# Patient Record
Sex: FEMALE | Race: WHITE | NOT HISPANIC OR LATINO | Employment: OTHER | URBAN - METROPOLITAN AREA
[De-identification: names, ages, dates, MRNs, and addresses within clinical notes are randomized per-mention and may not be internally consistent; named-entity substitution may affect disease eponyms.]

---

## 2017-01-25 ENCOUNTER — TRANSFERRED RECORDS (OUTPATIENT)
Dept: HEALTH INFORMATION MANAGEMENT | Facility: CLINIC | Age: 82
End: 2017-01-25

## 2017-02-28 ENCOUNTER — OFFICE VISIT (OUTPATIENT)
Dept: INTERNAL MEDICINE | Facility: CLINIC | Age: 82
End: 2017-02-28
Payer: COMMERCIAL

## 2017-02-28 VITALS
TEMPERATURE: 97.1 F | OXYGEN SATURATION: 96 % | HEART RATE: 86 BPM | DIASTOLIC BLOOD PRESSURE: 60 MMHG | WEIGHT: 181 LBS | BODY MASS INDEX: 30.16 KG/M2 | HEIGHT: 65 IN | RESPIRATION RATE: 16 BRPM | SYSTOLIC BLOOD PRESSURE: 128 MMHG

## 2017-02-28 DIAGNOSIS — E78.5 HYPERLIPIDEMIA LDL GOAL <100: ICD-10-CM

## 2017-02-28 DIAGNOSIS — I10 ESSENTIAL HYPERTENSION WITH GOAL BLOOD PRESSURE LESS THAN 140/90: ICD-10-CM

## 2017-02-28 DIAGNOSIS — E03.9 ACQUIRED HYPOTHYROIDISM: ICD-10-CM

## 2017-02-28 DIAGNOSIS — E11.59 TYPE 2 DIABETES MELLITUS WITH OTHER CIRCULATORY COMPLICATIONS (H): Primary | ICD-10-CM

## 2017-02-28 DIAGNOSIS — E04.1 THYROID NODULE: ICD-10-CM

## 2017-02-28 DIAGNOSIS — F41.9 ANXIETY: ICD-10-CM

## 2017-02-28 LAB
ANION GAP SERPL CALCULATED.3IONS-SCNC: 8 MMOL/L (ref 3–14)
BUN SERPL-MCNC: 18 MG/DL (ref 7–30)
CALCIUM SERPL-MCNC: 10.1 MG/DL (ref 8.5–10.1)
CHLORIDE SERPL-SCNC: 105 MMOL/L (ref 94–109)
CHOLEST SERPL-MCNC: 218 MG/DL
CO2 SERPL-SCNC: 26 MMOL/L (ref 20–32)
CREAT SERPL-MCNC: 0.67 MG/DL (ref 0.52–1.04)
GFR SERPL CREATININE-BSD FRML MDRD: 84 ML/MIN/1.7M2
GLUCOSE SERPL-MCNC: 120 MG/DL (ref 70–99)
HBA1C MFR BLD: 6.3 % (ref 4.3–6)
HDLC SERPL-MCNC: 52 MG/DL
LDLC SERPL CALC-MCNC: 124 MG/DL
NONHDLC SERPL-MCNC: 166 MG/DL
POTASSIUM SERPL-SCNC: 4.2 MMOL/L (ref 3.4–5.3)
SODIUM SERPL-SCNC: 139 MMOL/L (ref 133–144)
TRIGL SERPL-MCNC: 209 MG/DL

## 2017-02-28 PROCEDURE — 80061 LIPID PANEL: CPT | Performed by: INTERNAL MEDICINE

## 2017-02-28 PROCEDURE — 36415 COLL VENOUS BLD VENIPUNCTURE: CPT | Performed by: INTERNAL MEDICINE

## 2017-02-28 PROCEDURE — 80048 BASIC METABOLIC PNL TOTAL CA: CPT | Performed by: INTERNAL MEDICINE

## 2017-02-28 PROCEDURE — 83036 HEMOGLOBIN GLYCOSYLATED A1C: CPT | Performed by: INTERNAL MEDICINE

## 2017-02-28 PROCEDURE — 99207 C FOOT EXAM  NO CHARGE: CPT | Performed by: INTERNAL MEDICINE

## 2017-02-28 PROCEDURE — 99214 OFFICE O/P EST MOD 30 MIN: CPT | Performed by: INTERNAL MEDICINE

## 2017-02-28 RX ORDER — LEVOTHYROXINE SODIUM 75 UG/1
75 TABLET ORAL EVERY MORNING
Qty: 90 TABLET | Refills: 3 | Status: SHIPPED | OUTPATIENT
Start: 2017-02-28 | End: 2017-08-24

## 2017-02-28 ASSESSMENT — ANXIETY QUESTIONNAIRES
2. NOT BEING ABLE TO STOP OR CONTROL WORRYING: MORE THAN HALF THE DAYS
7. FEELING AFRAID AS IF SOMETHING AWFUL MIGHT HAPPEN: SEVERAL DAYS
IF YOU CHECKED OFF ANY PROBLEMS ON THIS QUESTIONNAIRE, HOW DIFFICULT HAVE THESE PROBLEMS MADE IT FOR YOU TO DO YOUR WORK, TAKE CARE OF THINGS AT HOME, OR GET ALONG WITH OTHER PEOPLE: NOT DIFFICULT AT ALL
1. FEELING NERVOUS, ANXIOUS, OR ON EDGE: MORE THAN HALF THE DAYS
GAD7 TOTAL SCORE: 7
5. BEING SO RESTLESS THAT IT IS HARD TO SIT STILL: NOT AT ALL
6. BECOMING EASILY ANNOYED OR IRRITABLE: SEVERAL DAYS
3. WORRYING TOO MUCH ABOUT DIFFERENT THINGS: SEVERAL DAYS

## 2017-02-28 ASSESSMENT — PATIENT HEALTH QUESTIONNAIRE - PHQ9: 5. POOR APPETITE OR OVEREATING: NOT AT ALL

## 2017-02-28 NOTE — NURSING NOTE
"Chief Complaint   Patient presents with     Diabetes     Anxiety       Initial /60 (BP Location: Right arm, Patient Position: Chair, Cuff Size: Adult Large)  Pulse 86  Temp 97.1  F (36.2  C) (Oral)  Resp 16  Ht 5' 4.5\" (1.638 m)  Wt 181 lb (82.1 kg)  SpO2 96%  BMI 30.59 kg/m2 Estimated body mass index is 30.59 kg/(m^2) as calculated from the following:    Height as of this encounter: 5' 4.5\" (1.638 m).    Weight as of this encounter: 181 lb (82.1 kg).  Medication Reconciliation: complete    "

## 2017-02-28 NOTE — PROGRESS NOTES
SUBJECTIVE:                                                    Norma F Alpers is a 82 year old female who presents to clinic today for the following health issues:      Diabetes Follow-up    Patient is checking blood sugars: once daily.  Results are as follows:         am - 133    Diabetic concerns: None     Symptoms of hypoglycemia (low blood sugar): none     Paresthesias (numbness or burning in feet) or sores: No     Date of last diabetic eye exam: 11/15/16     Anxiety Follow-Up    Status since last visit: Improved  passed away 3/16/16    Other associated symptoms:None    Complicating factors:   Significant life event: Yes-   passed 3/16/16, coming up on anniversery   Current substance abuse: None  Depression symptoms: No  JENNY-7 SCORE 9/23/2015 2/28/2017   Total Score 11 7        GAD7         Amount of exercise or physical activity: None    Problems taking medications regularly: No    Medication side effects: none  Diet: low fat/cholesterol and diabetic      Other problems:   1. HTN: not monitoring at home  2. Hyperlipidemia: on medication without side effects.   3. Hypothyroidism: stable    Current concerns:   She has been having some moderate left frontal headache. This has been a year. It is mostly in the morning but can come on later in the day. It is about every other day. She can feel a little off balanced with it but no other symptoms. It resolves within about 30 minutes. She does not need to take anything for it. It is stable without progression. No other focal neurologic symptoms.       Patient Active Problem List   Diagnosis     Advanced directives, counseling/discussion     Essential hypertension with goal blood pressure less than 140/90     Hyperlipidemia LDL goal <100     GERD (gastroesophageal reflux disease)     Aphasic stroke     Osteopenia     Hypothyroidism     Anxiety     Thyroid nodule     Type 2 diabetes mellitus with other circulatory complications (H)       Current Outpatient  Prescriptions   Medication Sig Dispense Refill     Multiple Vitamins-Minerals (MULTIVITAMIN ADULT) CHEW Take 1 chew tab by mouth daily       blood glucose monitoring (ACCU-CHEK SMARTVIEW) test strip Use to test blood sugar 2-3 times daily or as directed. 100 strip prn     sertraline (ZOLOFT) 100 MG tablet Take 1 tablet (100 mg) by mouth every morning 90 tablet 2     lisinopril-hydrochlorothiazide (PRINZIDE,ZESTORETIC) 10-12.5 MG per tablet Take 1 tablet by mouth daily 90 tablet 3     potassium chloride SA (POTASSIUM CHLORIDE) 20 MEQ tablet Take 1 tablet (20 mEq) by mouth 2 times daily 180 tablet 2     pravastatin (PRAVACHOL) 40 MG tablet Take 1 tablet (40 mg) by mouth daily 90 tablet 3     raloxifene (EVISTA) 60 MG tablet Take 1 tablet (60 mg) by mouth daily 90 tablet 3     traZODone (DESYREL) 150 MG tablet Take 1 tablet (150 mg) by mouth At Bedtime 90 tablet 3     EPINEPHrine (EPIPEN) 0.3 MG/0.3ML injection Inject 0.3 mLs (0.3 mg) into the muscle once as needed for anaphylaxis 2 each 5     levothyroxine (SYNTHROID, LEVOTHROID) 75 MCG tablet Take 1 tablet (75 mcg) by mouth every morning 90 tablet 3     ranitidine (ZANTAC) 150 MG tablet Take 1 tablet (150 mg) by mouth 2 times daily as needed for heartburn 60 tablet 5     cetirizine (ZYRTEC) 10 MG tablet Take 10 mg by mouth daily as needed for allergies       multivitamin (OCUVITE) TABS Take 1 tablet by mouth daily       docusate sodium (COLACE) 100 MG tablet Take 100 mg by mouth 2 times daily       TURMERIC PO Take 1 tablet by mouth daily       glimepiride (AMARYL) 1 MG tablet Take 1 tablet (1 mg) by mouth every morning (before breakfast) 90 tablet 1     lancets thin MISC 1 Device 2 times daily. 200 each prn     aspirin 325 MG tablet Take  by mouth daily.       glucosamine-chondroitinoitin 750-600 MG TABS Take 1 tablet by mouth 2 times daily.         omega-3 fatty acids (FISH OIL) 1200 MG capsule Take 1 capsule by mouth daily.       Calcium Carbonate (CALCIUM 600 PO)  "Take 1 tablet by mouth daily 2 times daily         Social History   Substance Use Topics     Smoking status: Former Smoker     Quit date: 3/27/1975     Smokeless tobacco: Never Used     Alcohol use Yes      Comment: occasionally        ROS:  General: no fever, chills  Weight: stable  Vision:some issues with focus. Last eye exam 11/16, will schedule.  ENT: negative  Respiratory negative.  Cardiac: no chest pain or pressure  Abdominal: no nausea, vomiting, abdominal pain, bowel changes  Vascular no complaints of claudication  Neurologic:no complaints of neuropathy  Feet no lesions, in grown nails, edema   : no polyuria, hematuria, dysuria      Objective:  Patient alert in NAD  /60 (BP Location: Right arm, Patient Position: Chair, Cuff Size: Adult Large)  Pulse 86  Temp 97.1  F (36.2  C) (Oral)  Resp 16  Ht 5' 4.5\" (1.638 m)  Wt 181 lb (82.1 kg)  SpO2 96%  BMI 30.59 kg/m2       Wt Readings from Last 4 Encounters:   02/28/17 181 lb (82.1 kg)   08/09/16 179 lb (81.2 kg)   11/12/15 168 lb (76.2 kg)   11/02/15 169 lb 8 oz (76.9 kg)       CV: CV: normal S1, S2 without murmur, S3 or S4.  Carotid pulses: full  LUNGS: clear  Feet: pulses full, normal capillary refill  No lesions, sores or skin changes  Nails normal  Sensation able to feel fine filament    Lab Results   Component Value Date    A1C 6.8 07/21/2016    A1C 6.4 09/23/2015    A1C 6.3 01/14/2015    A1C 6.2 11/13/2014     Lab Results   Component Value Date    CHOL 214 07/21/2016    HDL 51 07/21/2016     07/21/2016    TRIG 208 07/21/2016    CHOLHDLRATIO 2.6 01/14/2015     JENNY-7 SCORE 9/23/2015 2/28/2017   Total Score 11 7         ASSESSMENT:   (E11.59) Type 2 diabetes mellitus with other circulatory complications (H)  (primary encounter diagnosis)  Comment: lab due  Plan: Basic metabolic panel, Hemoglobin A1c, FOOT         EXAM            (E03.9) Acquired hypothyroidism  Comment: clinically stable  Plan: continue med    (I10) Essential hypertension with " goal blood pressure less than 140/90  Comment: controlled  Plan: Basic metabolic panel        Continue med    (E78.5) Hyperlipidemia LDL goal <100  Comment: recheck, not at goal  Plan: Lipid Profile with reflex to direct LDL            (F41.9) Anxiety  Comment: improved  Plan: continue med    Adelita Kennedy MD  [facility]

## 2017-02-28 NOTE — MR AVS SNAPSHOT
"              After Visit Summary   2/28/2017    Norma F Alpers    MRN: [id number]           Patient Information     Date Of Birth          10/29/1934        Visit Information        Provider Department      2/28/2017 9:20 AM Adelita Kennedy MD [facility]        Today's Diagnoses     Type 2 diabetes mellitus with other circulatory complications (H)    -  1    Acquired hypothyroidism        Essential hypertension with goal blood pressure less than 140/90        Hyperlipidemia LDL goal <100           Follow-ups after your visit        Your next 10 appointments already scheduled     Apr 13, 2017  5:00 PM CDT   SHORT with Declan Schmitt MD   [facility] ([facility])    [address]  [facility] [id number] [phone]              Who to contact     If you have questions or need follow up information about today's clinic visit or your schedule please contact [facility] directly at [phone].  Normal or non-critical lab and imaging results will be communicated to you by MyChart, letter or phone within 4 business days after the clinic has received the results. If you do not hear from us within 7 days, please contact the clinic through MyChart or phone. If you have a critical or abnormal lab result, we will notify you by phone as soon as possible.  Submit refill requests through [employer] or call your pharmacy and they will forward the refill request to us. Please allow 3 business days for your refill to be completed.          Additional Information About Your Visit        MyChart Information     [employer] lets you send messages to your doctor, view your test results, renew your prescriptions, schedule appointments and more. To sign up, go to [email or web address] . Click on \"Log in\" on the left side of the screen, which will take you to the Welcome page. Then click on \"Sign up Now\" on the right side of the page.     You will be asked to " "enter the access code listed below, as well as some personal information. Please follow the directions to create your username and password.     Your access code is: [email or web address]  Expires: 3/26/2017  4:03 PM     Your access code will  in 90 days. If you need help or a new code, please call your [facility] or [phone].        Care EveryWhere ID     This is your Care EveryWhere ID. This could be used by other organizations to access your [facility] medical records  [id number]        Your Vitals Were     Pulse Temperature Respirations Height Pulse Oximetry BMI (Body Mass Index)    86 97.1  F (36.2  C) (Oral) 16 5' 4.5\" (1.638 m) 96% 30.59 kg/m2       Blood Pressure from Last 3 Encounters:   17 128/60   16 116/63   16 138/64    Weight from Last 3 Encounters:   17 181 lb (82.1 kg)   16 179 lb (81.2 kg)   11/12/15 168 lb (76.2 kg)              We Performed the Following     Basic metabolic panel     Hemoglobin A1c     Lipid Profile with reflex to direct LDL        Primary Care Provider Office Phone # Fax #    Declan Schmitt [phone]       [facility] [address] [place] [address]        Thank you!     Thank you for choosing [facility]  for your care. Our goal is always to provide you with excellent care. Hearing back from our patients is one way we can continue to improve our services. Please take a few minutes to complete the written survey that you may receive in the mail after your visit with us. Thank you!             Your Updated Medication List - Protect others around you: Learn how to safely use, store and throw away your medicines at www.disposemymeds.org.          This list is accurate as of: 17 10:33 AM.  Always use your most recent med list.                   Brand Name Dispense Instructions for use    aspirin 325 MG tablet      Take  by mouth daily.       blood glucose monitoring test strip    " ACCU-CHEK SMARTVIEW    100 strip    Use to test blood sugar 2-3 times daily or as directed.       CALCIUM 600 PO      Take 1 tablet by mouth daily 2 times daily       cetirizine 10 MG tablet    zyrTEC     Take 10 mg by mouth daily as needed for allergies       docusate sodium 100 MG tablet    COLACE     Take 100 mg by mouth 2 times daily       EPINEPHrine 0.3 MG/0.3ML injection     2 each    Inject 0.3 mLs (0.3 mg) into the muscle once as needed for anaphylaxis       glimepiride 1 MG tablet    AMARYL    90 tablet    Take 1 tablet (1 mg) by mouth every morning (before breakfast)       glucosamine-chondroitinoitin 750-600 MG Tabs      Take 1 tablet by mouth 2 times daily.       levothyroxine 75 MCG tablet    SYNTHROID/LEVOTHROID    90 tablet    Take 1 tablet (75 mcg) by mouth every morning       lisinopril-hydrochlorothiazide 10-12.5 MG per tablet    PRINZIDE/ZESTORETIC    90 tablet    Take 1 tablet by mouth daily       * multivitamin Tabs tablet      Take 1 tablet by mouth daily       * MULTIVITAMIN ADULT Chew      Take 1 chew tab by mouth daily       omega-3 fatty acids 1200 MG capsule      Take 1 capsule by mouth daily.       potassium chloride SA 20 MEQ CR tablet    potassium chloride    180 tablet    Take 1 tablet (20 mEq) by mouth 2 times daily       pravastatin 40 MG tablet    PRAVACHOL    90 tablet    Take 1 tablet (40 mg) by mouth daily       raloxifene 60 MG tablet    EVISTA    90 tablet    Take 1 tablet (60 mg) by mouth daily       ranitidine 150 MG tablet    ZANTAC    60 tablet    Take 1 tablet (150 mg) by mouth 2 times daily as needed for heartburn       sertraline 100 MG tablet    ZOLOFT    90 tablet    Take 1 tablet (100 mg) by mouth every morning       thin lancets    NO BRAND SPECIFIED    200 each    1 Device 2 times daily.       traZODone 150 MG tablet    DESYREL    90 tablet    Take 1 tablet (150 mg) by mouth At Bedtime       TURMERIC PO      Take 1 tablet by mouth daily       * Notice:  This list  has 2 medication(s) that are the same as other medications prescribed for you. Read the directions carefully, and ask your doctor or other care provider to review them with you.

## 2017-03-01 ASSESSMENT — ANXIETY QUESTIONNAIRES: GAD7 TOTAL SCORE: 7

## 2017-03-11 DIAGNOSIS — E78.5 HYPERLIPIDEMIA LDL GOAL <100: ICD-10-CM

## 2017-03-12 NOTE — TELEPHONE ENCOUNTER
Please call and advise her of lab results: the sugars are doing well with hemoglobin a1c at 6.3 which is at goal. Her kidney tests are normal. Her LDL has no gone down any with this dose of Pravastatin. The options would be to increase the dose to 80 mg or change to a more potent medication like lipitor. If she is tolerating this well, I would suggest she could try increasing to 80 mg first and recheck labs in 2 months. If not improving then could change drug. Send back to me to do orders. She can use up the pravastatin either way.

## 2017-03-13 RX ORDER — PRAVASTATIN SODIUM 80 MG/1
80 TABLET ORAL DAILY
Qty: 90 TABLET | Refills: 1 | Status: SHIPPED | OUTPATIENT
Start: 2017-03-13 | End: 2017-08-24

## 2017-03-13 RX ORDER — PRAVASTATIN SODIUM 40 MG
40 TABLET ORAL DAILY
Qty: 90 TABLET | Refills: 3 | Status: CANCELLED | OUTPATIENT
Start: 2017-03-13

## 2017-03-13 NOTE — TELEPHONE ENCOUNTER
Let pt know results and she wants to continue the Pravastatin as she's had muscle aches with Lipitor. Routed Pravastatin 80 mg to pool to refill.

## 2017-03-24 NOTE — TELEPHONE ENCOUNTER
Pt called and reached.  Discussed the below information.    Lipid panel ordered and pt schedule for the repeat lab on Monday May 15, 2017

## 2017-03-29 ENCOUNTER — TRANSFERRED RECORDS (OUTPATIENT)
Dept: HEALTH INFORMATION MANAGEMENT | Facility: CLINIC | Age: 82
End: 2017-03-29

## 2017-04-03 ENCOUNTER — TRANSFERRED RECORDS (OUTPATIENT)
Dept: HEALTH INFORMATION MANAGEMENT | Facility: CLINIC | Age: 82
End: 2017-04-03

## 2017-05-02 DIAGNOSIS — I10 ESSENTIAL HYPERTENSION: ICD-10-CM

## 2017-05-02 RX ORDER — POTASSIUM CHLORIDE 1500 MG/1
TABLET, EXTENDED RELEASE ORAL
Qty: 180 TABLET | Refills: 2 | Status: SHIPPED | OUTPATIENT
Start: 2017-05-02 | End: 2017-08-24

## 2017-05-02 NOTE — TELEPHONE ENCOUNTER
Potassium Chloride      Last Written Prescription Date: 9/19/16  Last Fill Quantity: 180, # refills: 2  Last Office Visit with [facility], [facility] or [facility] prescribing provider: 2/28/17       Potassium   Date Value Ref Range Status   02/28/2017 4.2 3.4 - 5.3 mmol/L Final     Creatinine   Date Value Ref Range Status   02/28/2017 0.67 0.52 - 1.04 mg/dL Final     BP Readings from Last 3 Encounters:   02/28/17 128/60   12/26/16 116/63   08/09/16 138/64     Prescription approved per [facility] Refill Protocol.

## 2017-05-15 DIAGNOSIS — E11.59 TYPE 2 DIABETES MELLITUS WITH OTHER CIRCULATORY COMPLICATIONS (H): ICD-10-CM

## 2017-05-15 DIAGNOSIS — E78.5 HYPERLIPIDEMIA LDL GOAL <100: ICD-10-CM

## 2017-05-15 DIAGNOSIS — I10 ESSENTIAL HYPERTENSION WITH GOAL BLOOD PRESSURE LESS THAN 140/90: Primary | ICD-10-CM

## 2017-05-15 LAB
ALT SERPL W P-5'-P-CCNC: 30 U/L (ref 0–50)
CHOLEST SERPL-MCNC: 209 MG/DL
HDLC SERPL-MCNC: 55 MG/DL
LDLC SERPL CALC-MCNC: 118 MG/DL
NONHDLC SERPL-MCNC: 154 MG/DL
TRIGL SERPL-MCNC: 182 MG/DL

## 2017-05-15 PROCEDURE — 80061 LIPID PANEL: CPT | Performed by: INTERNAL MEDICINE

## 2017-05-15 PROCEDURE — 84460 ALANINE AMINO (ALT) (SGPT): CPT | Performed by: INTERNAL MEDICINE

## 2017-05-15 PROCEDURE — 36415 COLL VENOUS BLD VENIPUNCTURE: CPT | Performed by: INTERNAL MEDICINE

## 2017-07-11 DIAGNOSIS — G47.00 INSOMNIA, UNSPECIFIED TYPE: ICD-10-CM

## 2017-07-11 RX ORDER — TRAZODONE HYDROCHLORIDE 150 MG/1
TABLET ORAL
Qty: 90 TABLET | Refills: 3 | OUTPATIENT
Start: 2017-07-11

## 2017-07-11 NOTE — TELEPHONE ENCOUNTER
Trazodone       Last Written Prescription Date: 08/09/16  Last Fill Quantity: 90; # refills: 3  Last Office Visit with [facility], [facility] or [facility] prescribing provider:  02/28/17        Last PHQ-9 score on record=   PHQ-9 SCORE 9/23/2015   Total Score -   Total Score 16       Lab Results   Component Value Date    AST 30 07/21/2016     Lab Results   Component Value Date    ALT 30 05/15/2017       Labs showing if normal/abnormal  Lab Results   Component Value Date    AST 30 07/21/2016    ALT 30 05/15/2017

## 2017-07-14 DIAGNOSIS — F41.9 ANXIETY: ICD-10-CM

## 2017-07-14 NOTE — TELEPHONE ENCOUNTER
sertraline     Last Written Prescription Date: 10/19/16  Last Fill Quantity: 90, # refills: 2  Last Office Visit with [facility] primary care provider:  2/28/17        Last PHQ-9 score on record=   PHQ-9 SCORE 9/23/2015   Total Score -   Total Score 16

## 2017-07-15 RX ORDER — SERTRALINE HYDROCHLORIDE 100 MG/1
TABLET, FILM COATED ORAL
Qty: 90 TABLET | Refills: 3 | Status: SHIPPED | OUTPATIENT
Start: 2017-07-15 | End: 2017-08-24

## 2017-07-15 NOTE — TELEPHONE ENCOUNTER
Routing refill request to provider for review/approval because:  PHQ-9 > 6 months ago  PHQ-9 >5    Please advise, thanks.

## 2017-07-17 ENCOUNTER — OFFICE VISIT (OUTPATIENT)
Dept: INTERNAL MEDICINE | Facility: CLINIC | Age: 82
End: 2017-07-17
Payer: COMMERCIAL

## 2017-07-17 ENCOUNTER — HOSPITAL ENCOUNTER (OUTPATIENT)
Dept: CT IMAGING | Facility: CLINIC | Age: 82
Discharge: HOME | End: 2017-07-17
Attending: INTERNAL MEDICINE | Admitting: INTERNAL MEDICINE
Payer: COMMERCIAL

## 2017-07-17 VITALS
DIASTOLIC BLOOD PRESSURE: 80 MMHG | HEART RATE: 89 BPM | SYSTOLIC BLOOD PRESSURE: 128 MMHG | TEMPERATURE: 98.5 F | OXYGEN SATURATION: 94 % | WEIGHT: 181.5 LBS | BODY MASS INDEX: 30.24 KG/M2 | HEIGHT: 65 IN

## 2017-07-17 DIAGNOSIS — R10.84 ABDOMINAL PAIN, GENERALIZED: ICD-10-CM

## 2017-07-17 DIAGNOSIS — R19.5 DARK STOOLS: ICD-10-CM

## 2017-07-17 DIAGNOSIS — R10.84 ABDOMINAL PAIN, GENERALIZED: Primary | ICD-10-CM

## 2017-07-17 LAB
ALBUMIN UR-MCNC: NEGATIVE MG/DL
APPEARANCE UR: CLEAR
BASOPHILS # BLD AUTO: 0 10E9/L (ref 0–0.2)
BASOPHILS NFR BLD AUTO: 0.2 %
BILIRUB UR QL STRIP: NEGATIVE
COLOR UR AUTO: YELLOW
CREAT BLD-MCNC: 0.7 MG/DL (ref 0.52–1.04)
DIFFERENTIAL METHOD BLD: ABNORMAL
EOSINOPHIL # BLD AUTO: 0.2 10E9/L (ref 0–0.7)
EOSINOPHIL NFR BLD AUTO: 2 %
ERYTHROCYTE [DISTWIDTH] IN BLOOD BY AUTOMATED COUNT: 15.6 % (ref 10–15)
GFR SERPL CREATININE-BSD FRML MDRD: 80 ML/MIN/1.7M2
GLUCOSE UR STRIP-MCNC: NEGATIVE MG/DL
HCT VFR BLD AUTO: 43.1 % (ref 35–47)
HGB BLD-MCNC: 13.8 G/DL (ref 11.7–15.7)
HGB UR QL STRIP: NEGATIVE
KETONES UR STRIP-MCNC: NEGATIVE MG/DL
LEUKOCYTE ESTERASE UR QL STRIP: ABNORMAL
LIPASE SERPL-CCNC: 212 U/L (ref 73–393)
LYMPHOCYTES # BLD AUTO: 2.7 10E9/L (ref 0.8–5.3)
LYMPHOCYTES NFR BLD AUTO: 28.1 %
MCH RBC QN AUTO: 29.4 PG (ref 26.5–33)
MCHC RBC AUTO-ENTMCNC: 32 G/DL (ref 31.5–36.5)
MCV RBC AUTO: 92 FL (ref 78–100)
MONOCYTES # BLD AUTO: 0.7 10E9/L (ref 0–1.3)
MONOCYTES NFR BLD AUTO: 7.5 %
NEUTROPHILS # BLD AUTO: 6 10E9/L (ref 1.6–8.3)
NEUTROPHILS NFR BLD AUTO: 62.2 %
NITRATE UR QL: NEGATIVE
NON-SQ EPI CELLS #/AREA URNS LPF: ABNORMAL /LPF
PH UR STRIP: 5 PH (ref 5–7)
PLATELET # BLD AUTO: 210 10E9/L (ref 150–450)
RBC # BLD AUTO: 4.7 10E12/L (ref 3.8–5.2)
RBC #/AREA URNS AUTO: ABNORMAL /HPF (ref 0–2)
SP GR UR STRIP: 1.02 (ref 1–1.03)
URN SPEC COLLECT METH UR: ABNORMAL
UROBILINOGEN UR STRIP-ACNC: 0.2 EU/DL (ref 0.2–1)
WBC # BLD AUTO: 9.7 10E9/L (ref 4–11)
WBC #/AREA URNS AUTO: ABNORMAL /HPF (ref 0–2)

## 2017-07-17 PROCEDURE — 85025 COMPLETE CBC W/AUTO DIFF WBC: CPT | Performed by: INTERNAL MEDICINE

## 2017-07-17 PROCEDURE — 36415 COLL VENOUS BLD VENIPUNCTURE: CPT | Performed by: INTERNAL MEDICINE

## 2017-07-17 PROCEDURE — 99214 OFFICE O/P EST MOD 30 MIN: CPT | Performed by: INTERNAL MEDICINE

## 2017-07-17 PROCEDURE — 74177 CT ABD & PELVIS W/CONTRAST: CPT

## 2017-07-17 PROCEDURE — 80053 COMPREHEN METABOLIC PANEL: CPT | Performed by: INTERNAL MEDICINE

## 2017-07-17 PROCEDURE — 25000128 H RX IP 250 OP 636: Performed by: INTERNAL MEDICINE

## 2017-07-17 PROCEDURE — 82565 ASSAY OF CREATININE: CPT

## 2017-07-17 PROCEDURE — 83690 ASSAY OF LIPASE: CPT | Performed by: INTERNAL MEDICINE

## 2017-07-17 PROCEDURE — 81001 URINALYSIS AUTO W/SCOPE: CPT | Performed by: INTERNAL MEDICINE

## 2017-07-17 RX ORDER — IOPAMIDOL 755 MG/ML
500 INJECTION, SOLUTION INTRAVASCULAR ONCE
Status: COMPLETED | OUTPATIENT
Start: 2017-07-17 | End: 2017-07-17

## 2017-07-17 RX ADMIN — IOPAMIDOL 91 ML: 755 INJECTION, SOLUTION INTRAVENOUS at 15:32

## 2017-07-17 RX ADMIN — SODIUM CHLORIDE 64 ML: 9 INJECTION, SOLUTION INTRAVENOUS at 15:32

## 2017-07-17 NOTE — NURSING NOTE
"Chief Complaint   Patient presents with     Abdominal Pain     Stomach pain for 3 weeks now.       Initial /80 (BP Location: Left arm, Patient Position: Sitting, Cuff Size: Adult Large)  Pulse 89  Temp 98.5  F (36.9  C) (Oral)  Ht 5' 4.5\" (1.638 m)  Wt 181 lb 8 oz (82.3 kg)  SpO2 94%  BMI 30.67 kg/m2 Estimated body mass index is 30.67 kg/(m^2) as calculated from the following:    Height as of this encounter: 5' 4.5\" (1.638 m).    Weight as of this encounter: 181 lb 8 oz (82.3 kg).  Medication Reconciliation: complete   Soniya West MA        "

## 2017-07-17 NOTE — NURSING NOTE
CT Scan Abdomen Pelvis w/Contrast scheduled for patient today at 4 PM at [facility]. Instructed them to hold patient there and call Dr. Ibrahim with results. Patient is to check in at 3:45 PM for appointment or be there at 2 PM to drink contrast. Nothing to eat or drink from 2 PM - 4PM. Patient is aware of this. Patient will be there at 2PM today to drink contrast and wait for her appointment.  Soniya West MA

## 2017-07-17 NOTE — LETTER
[facility]  [address], [address]  [place] [address]  [phone]        July 18, 2017    Norma F Alpers  [address]  [facility] [address]            Dear Ms. Norma F Alpers:      Enclosed are the results of the recent labs.    The labs are all within normal limits.   Please call the clinic at [phone] if you have any questions.     Sincerely,        Anika Ibrahim M.D.

## 2017-07-17 NOTE — MR AVS SNAPSHOT
After Visit Summary   7/17/2017    Norma F Alpers    MRN: [id number]           Patient Information     Date Of Birth          10/29/1934        Visit Information        Provider Department      7/17/2017 9:00 AM Anika Ibrahim MD [facility]        Today's Diagnoses     Abdominal pain, generalized    -  1    Dark stools           Follow-ups after your visit        Your next 10 appointments already scheduled     Jul 17, 2017  4:00 PM CDT   CT ABDOMEN PELVIS W CONTRAST with RHCT2   [facility]iology ([facility])    [address] [facility] [address]   [phone]           Please bring any scans or X-rays taken at other hospitals, if similar tests were done. Also bring a list of your medicines, including vitamins, minerals and over-the-counter drugs. It is safest to leave personal items at home.  Be sure to tell your doctor:   If you have any allergies.   If there s any chance you are pregnant.   If you are breastfeeding.   If you have any special needs.  You may have contrast for this exam. To prepare:   Do not eat or drink for 2 hours before your exam. If you need to take medicine, you may take it with small sips of water. (We may ask you to take liquid medicine as well.)   The day before your exam, drink extra fluids at least six 8-ounce glasses (unless your doctor tells you to restrict your fluids).  Patients over 70 or patients with diabetes or kidney problems:   If you haven t had a blood test (creatinine test) within the last 30 days, go to your clinic or Diagnostic Imaging Department for this test.  If you have diabetes:   If your kidney function is normal, continue taking your metformin (Avandamet, Glucophage, Glucovance, Metaglip) on the day of your exam.   If your kidney function is abnormal, wait 48 hours before restarting this medicine.  You will have oral contrast for this exam:   You will drink the contrast at home. Get this from your  "clinic or Diagnostic Imaging Department. Please follow the directions given.  Please wear loose clothing, such as a sweat suit or jogging clothes. Avoid snaps, zippers and other metal. We may ask you to undress and put on a hospital gown.  If you have any questions, please call the Imaging Department where you will have your exam.              Future tests that were ordered for you today     Open Future Orders        Priority Expected Expires Ordered    CT Abdomen Pelvis w Contrast STAT  7/17/2018 7/17/2017    Fecal colorectal cancer screen (FIT) Routine 8/7/2017 10/9/2017 7/17/2017            Who to contact     If you have questions or need follow up information about today's clinic visit or your schedule please contact [facility] directly at [phone].  Normal or non-critical lab and imaging results will be communicated to you by MyChart, letter or phone within 4 business days after the clinic has received the results. If you do not hear from us within 7 days, please contact the clinic through [facility] or phone. If you have a critical or abnormal lab result, we will notify you by phone as soon as possible.  Submit refill requests through [facility] or call your pharmacy and they will forward the refill request to us. Please allow 3 business days for your refill to be completed.          Additional Information About Your Visit        [facility] Information     [facility] lets you send messages to your doctor, view your test results, renew your prescriptions, schedule appointments and more. To sign up, go to [email or web address] . Click on \"Log in\" on the left side of the screen, which will take you to the Welcome page. Then click on \"Sign up Now\" on the right side of the page.     You will be asked to enter the access code listed below, as well as some personal information. Please follow the directions to create your username and password.     Your access code is: [email or web address]  Expires: 10/15/2017 " "11:45 AM     Your access code will  in 90 days. If you need help or a new code, please call your [facility] or [phone].        Care EveryWhere ID     This is your Care EveryWhere ID. This could be used by other organizations to access your [facility] medical records  [id number]        Your Vitals Were     Pulse Temperature Height Pulse Oximetry BMI (Body Mass Index)       89 98.5  F (36.9  C) (Oral) 5' 4.5\" (1.638 m) 94% 30.67 kg/m2        Blood Pressure from Last 3 Encounters:   17 128/80   17 128/60   16 116/63    Weight from Last 3 Encounters:   17 181 lb 8 oz (82.3 kg)   17 181 lb (82.1 kg)   16 179 lb (81.2 kg)              We Performed the Following     CBC with platelets differential     Comprehensive metabolic panel     Lipase     UA with Microscopic reflex to Culture        Primary Care Provider Office Phone # Fax #    Adelita Kennedy [phone]       [facility] [address] [facility] [address]  [facility] [address]        Equal Access to Services     STAS CÁRDENAS AH: Hadii aad ku hadasho Soomaali, waaxda luqadaha, qaybta kaalmada adeegyada, waxay mickyin haykarenn tiffanie waldron ah. So [facility] [place] [facility] [phone].    ATENCIÓN: Si habla español, tiene a bro disposición servicios gratuitos de asistencia lingüística. Kyree al [phone].    We comply with applicable federal civil rights laws and [place] laws. We do not discriminate on the basis of race, color, national origin, age, disability sex, sexual orientation or gender identity.            Thank you!     Thank you for choosing [facility]  for your care. Our goal is always to provide you with excellent care. Hearing back from our patients is one way we can continue to improve our services. Please take a few minutes to complete the written survey that you may receive in the mail after your visit with us. Thank you!             Your Updated Medication List - Protect others around " you: Learn how to safely use, store and throw away your medicines at www.disposemymeds.org.          This list is accurate as of: 7/17/17 11:45 AM.  Always use your most recent med list.                   Brand Name Dispense Instructions for use Diagnosis    aspirin 325 MG tablet      Take  by mouth daily.        blood glucose monitoring test strip    ACCU-CHEK SMARTVIEW    100 strip    Use to test blood sugar 2-3 times daily or as directed.    Type 2 diabetes mellitus with other circulatory complications (H)       CALCIUM 600 PO      Take 1 tablet by mouth daily 2 times daily        cetirizine 10 MG tablet    zyrTEC     Take 10 mg by mouth daily as needed for allergies        docusate sodium 100 MG tablet    COLACE     Take 100 mg by mouth 2 times daily        EPINEPHrine 0.3 MG/0.3ML injection 2-pack    EPIPEN/ADRENACLICK/or ANY BX GENERIC EQUIV    2 each    Inject 0.3 mLs (0.3 mg) into the muscle once as needed for anaphylaxis    Bee allergy status       glimepiride 1 MG tablet    AMARYL    90 tablet    Take 1 tablet (1 mg) by mouth every morning (before breakfast)    Type 2 diabetes, HbA1C goal < 8% (H)       glucosamine-chondroitinoitin 750-600 MG Tabs      Take 1 tablet by mouth 2 times daily.        levothyroxine 75 MCG tablet    SYNTHROID/LEVOTHROID    90 tablet    Take 1 tablet (75 mcg) by mouth every morning    Thyroid nodule, Acquired hypothyroidism       lisinopril-hydrochlorothiazide 10-12.5 MG per tablet    PRINZIDE/ZESTORETIC    90 tablet    Take 1 tablet by mouth daily    Essential hypertension with goal blood pressure less than 140/90       * multivitamin Tabs tablet      Take 1 tablet by mouth daily        * MULTIVITAMIN ADULT Chew      Take 1 chew tab by mouth daily        omega-3 fatty acids 1200 MG capsule      Take 1 capsule by mouth daily.        * potassium chloride SA 20 MEQ CR tablet    potassium chloride    180 tablet    Take 1 tablet (20 mEq) by mouth 2 times daily    Essential  hypertension, hypertension with unspecified goal       * potassium chloride 20 MEQ CR tablet   Generic drug:  potassium chloride SA     180 tablet    TAKE 1 TABLET (20 MEQ) BY MOUTH 2 TIMES DAILY    Essential hypertension       * pravastatin 40 MG tablet    PRAVACHOL    90 tablet    Take 1 tablet (40 mg) by mouth daily    Hyperlipidemia LDL goal <100       * pravastatin 80 MG tablet    PRAVACHOL    90 tablet    Take 1 tablet (80 mg) by mouth daily    Hyperlipidemia LDL goal <100       raloxifene 60 MG tablet    EVISTA    90 tablet    Take 1 tablet (60 mg) by mouth daily    Osteopenia       ranitidine 150 MG tablet    ZANTAC    60 tablet    Take 1 tablet (150 mg) by mouth 2 times daily as needed for heartburn    Gastroesophageal reflux disease without esophagitis       sertraline 100 MG tablet    ZOLOFT    90 tablet    TAKE 1 TABLET (100 MG) BY MOUTH EVERY MORNING    Anxiety       thin lancets    NO BRAND SPECIFIED    200 each    1 Device 2 times daily.    Type 2 diabetes, HbA1C goal < 8% (H)       traZODone 150 MG tablet    DESYREL    90 tablet    Take 1 tablet (150 mg) by mouth At Bedtime    Insomnia, unspecified type       TURMERIC PO      Take 1 tablet by mouth daily        * Notice:  This list has 6 medication(s) that are the same as other medications prescribed for you. Read the directions carefully, and ask your doctor or other care provider to review them with you.

## 2017-07-17 NOTE — PROGRESS NOTES
SUBJECTIVE:                                                    Norma F Alpers is a 82 year old female who presents to clinic today for the following health issues:      Abdominal Pain      Duration: 3 weeks.    Description (location/character/radiation): Stomach area.       Associated flank pain: None    Intensity:  moderate    Accompanying signs and symptoms:        Fever/Chills: no        Gas/Bloating: YES       Nausea/vomitting: YES       Diarrhea: no        Dysuria or Hematuria: no     History (previous similar pain/trauma/previous testing): No.    Precipitating or alleviating factors:       Pain worse with eating/BM/urination: No.       Pain relieved by BM: YES    Therapies tried and outcome: None    LMP:  not applicable    She reports that she had a dark stool the first day she had abdominal pain.  The pain is constant.  The pain is rated as a 7/10. Does not wake her up at night.   The pain is in mid abdomen.  She also feels bloated.   Does not worsen with food.  She has some nausea.  No vomiting.  She has not had diarrhea or constipation.  Now the color of her stool is a light tan.  No change in foods.  No travel.   No sick contacts.     Of note, no urine symptoms appreciated by patient.     Last colonoscopy in 2005.     DM.  Last hemoglobin A1c was 6.3%.  She takes amaryl.   She has follow up in August planned.     H/o CVA. Patient reports no residual symptoms.  She reports she had aphasia.      Problem list and histories reviewed & adjusted, as indicated.  Additional history: as documented      Reviewed and updated as needed this visit by clinical staff  Tobacco  Allergies  Meds  Med Hx  Surg Hx  Fam Hx  Soc Hx      Reviewed and updated as needed this visit by Provider         ROS:  C: NEGATIVE for fever, chills, change in weight  R: NEGATIVE for significant cough or SOB  CV: NEGATIVE for chest pain, palpitations or peripheral edema  GI: POS abdominal pain- see HPI  : no urinary  "sypmtoms    OBJECTIVE:     /80 (BP Location: Left arm, Patient Position: Sitting, Cuff Size: Adult Large)  Pulse 89  Temp 98.5  F (36.9  C) (Oral)  Ht 5' 4.5\" (1.638 m)  Wt 181 lb 8 oz (82.3 kg)  SpO2 94%  BMI 30.67 kg/m2  Body mass index is 30.67 kg/(m^2).  GENERAL: healthy, alert and no distress  RESP: lungs clear to auscultation - no rales, rhonchi or wheezes  CV: regular rate and rhythm, normal S1 S2, no S3 or S4, no murmur, click or rub, no peripheral edema and peripheral pulses strong  ABDOMEN: soft, tender diffusely upon palpation with guarding; no hepatosplenomegaly, no masses; bowel sounds decreased    ASSESSMENT/PLAN:       (R10.84) Abdominal pain, generalized  (primary encounter diagnosis)  Comment: consider ischemic bowel versus bowel obstruction   Plan: Comprehensive metabolic panel, CBC with         platelets differential, CT Abdomen Pelvis w         Contrast, Lipase, UA with Microscopic reflex to        Culture     -discussed with radiologist differential    (R19.5) Dark stools  Comment:   Plan: Fecal colorectal cancer screen (FIT)                Anika Ibrahim MD  [facility]    >25 minutes spent with patient, and >50% of time spent counseling regarding abdominal pain      "

## 2017-07-18 ENCOUNTER — TELEPHONE (OUTPATIENT)
Dept: INTERNAL MEDICINE | Facility: CLINIC | Age: 82
End: 2017-07-18

## 2017-07-18 DIAGNOSIS — R10.84 ABDOMINAL PAIN, GENERALIZED: Primary | ICD-10-CM

## 2017-07-18 LAB
ALBUMIN SERPL-MCNC: 3.7 G/DL (ref 3.4–5)
ALP SERPL-CCNC: 76 U/L (ref 40–150)
ALT SERPL W P-5'-P-CCNC: 28 U/L (ref 0–50)
ANION GAP SERPL CALCULATED.3IONS-SCNC: 7 MMOL/L (ref 3–14)
AST SERPL W P-5'-P-CCNC: 24 U/L (ref 0–45)
BILIRUB SERPL-MCNC: 0.4 MG/DL (ref 0.2–1.3)
BUN SERPL-MCNC: 17 MG/DL (ref 7–30)
CALCIUM SERPL-MCNC: 9.5 MG/DL (ref 8.5–10.1)
CHLORIDE SERPL-SCNC: 106 MMOL/L (ref 94–109)
CO2 SERPL-SCNC: 27 MMOL/L (ref 20–32)
CREAT SERPL-MCNC: 0.74 MG/DL (ref 0.52–1.04)
GFR SERPL CREATININE-BSD FRML MDRD: 75 ML/MIN/1.7M2
GLUCOSE SERPL-MCNC: 111 MG/DL (ref 70–99)
POTASSIUM SERPL-SCNC: 4.2 MMOL/L (ref 3.4–5.3)
PROT SERPL-MCNC: 7.5 G/DL (ref 6.8–8.8)
SODIUM SERPL-SCNC: 140 MMOL/L (ref 133–144)

## 2017-07-18 NOTE — TELEPHONE ENCOUNTER
The CT scan of the abdomen does not reveal an etiology for her pain.  The blood work is also within acceptable limits.      Recommend that she see a GI specialist.    Referral placed.     Also would like her to check for H. Pylori- stool test.

## 2017-07-19 NOTE — TELEPHONE ENCOUNTER
Pt left voice message returning phone call.     Attempted to contact pt. Left voice message to call back.

## 2017-07-25 DIAGNOSIS — F41.9 ANXIETY: ICD-10-CM

## 2017-07-26 RX ORDER — SERTRALINE HYDROCHLORIDE 100 MG/1
TABLET, FILM COATED ORAL
Qty: 90 TABLET | Refills: 2 | OUTPATIENT
Start: 2017-07-26

## 2017-07-26 NOTE — TELEPHONE ENCOUNTER
Sertraline     Last Written Prescription Date: 07/15/17  Last Fill Quantity: 90, # refills: 3  Last Office Visit with G primary care provider:  07/15/17        Last PHQ-9 score on record=   PHQ-9 SCORE 9/23/2015   Total Score -   Total Score 16

## 2017-07-28 DIAGNOSIS — M85.80 OSTEOPENIA: ICD-10-CM

## 2017-07-28 NOTE — TELEPHONE ENCOUNTER
Raloxifene    Last Written Prescription Date: 8/9/16  Last Fill Quantity: 90, # refills: 3  Last Office Visit with [facility], [facility] or [facility] prescribing provider: 7/17/17       DEXA Scan:  Last order of DX HIP/PELVIS/SPINE was found on 4/5/2012 from Transferred Records on 4/2/2012     Last order of DX HIP/PELVIS/SPINE W LAT FRACTION ANALYSIS was found on 10/19/2015 from Radiant Appointment on 10/19/2015       Creatinine   Date Value Ref Range Status   07/17/2017 0.74 0.52 - 1.04 mg/dL Final

## 2017-07-31 RX ORDER — RALOXIFENE HYDROCHLORIDE 60 MG/1
TABLET, FILM COATED ORAL
Qty: 90 TABLET | Refills: 0 | Status: SHIPPED | OUTPATIENT
Start: 2017-07-31 | End: 2017-10-21

## 2017-08-02 PROCEDURE — 82274 ASSAY TEST FOR BLOOD FECAL: CPT | Performed by: INTERNAL MEDICINE

## 2017-08-05 DIAGNOSIS — R19.5 DARK STOOLS: ICD-10-CM

## 2017-08-05 LAB — HEMOCCULT STL QL IA: NEGATIVE

## 2017-08-07 PROCEDURE — 87338 HPYLORI STOOL AG IA: CPT | Performed by: INTERNAL MEDICINE

## 2017-08-10 DIAGNOSIS — R10.84 ABDOMINAL PAIN, GENERALIZED: ICD-10-CM

## 2017-08-11 LAB
H PYLORI AG STL QL IA: NORMAL
MICRO REPORT STATUS: NORMAL
SPECIMEN SOURCE: NORMAL

## 2017-08-16 ENCOUNTER — TRANSFERRED RECORDS (OUTPATIENT)
Dept: HEALTH INFORMATION MANAGEMENT | Facility: CLINIC | Age: 82
End: 2017-08-16

## 2017-08-23 ENCOUNTER — HOSPITAL ENCOUNTER (OUTPATIENT)
Dept: GENERAL RADIOLOGY | Facility: CLINIC | Age: 82
Discharge: HOME | End: 2017-08-23
Attending: INTERNAL MEDICINE | Admitting: INTERNAL MEDICINE
Payer: COMMERCIAL

## 2017-08-23 DIAGNOSIS — R19.7 DIARRHEA, UNSPECIFIED TYPE: ICD-10-CM

## 2017-08-23 PROCEDURE — 74020 XR ABDOMEN 2 VW: CPT

## 2017-08-24 ENCOUNTER — OFFICE VISIT (OUTPATIENT)
Dept: PHARMACY | Facility: CLINIC | Age: 82
End: 2017-08-24

## 2017-08-24 ENCOUNTER — TRANSFERRED RECORDS (OUTPATIENT)
Dept: HEALTH INFORMATION MANAGEMENT | Facility: CLINIC | Age: 82
End: 2017-08-24

## 2017-08-24 VITALS
WEIGHT: 180 LBS | DIASTOLIC BLOOD PRESSURE: 75 MMHG | HEART RATE: 76 BPM | BODY MASS INDEX: 30.42 KG/M2 | SYSTOLIC BLOOD PRESSURE: 119 MMHG

## 2017-08-24 DIAGNOSIS — E03.9 ACQUIRED HYPOTHYROIDISM: ICD-10-CM

## 2017-08-24 DIAGNOSIS — E11.9 TYPE 2 DIABETES MELLITUS WITHOUT COMPLICATION, WITHOUT LONG-TERM CURRENT USE OF INSULIN (H): ICD-10-CM

## 2017-08-24 DIAGNOSIS — K21.9 GASTROESOPHAGEAL REFLUX DISEASE WITHOUT ESOPHAGITIS: Primary | ICD-10-CM

## 2017-08-24 DIAGNOSIS — E63.9 NUTRITIONAL DEFICIENCY: ICD-10-CM

## 2017-08-24 DIAGNOSIS — M85.80 OSTEOPENIA, UNSPECIFIED LOCATION: ICD-10-CM

## 2017-08-24 DIAGNOSIS — J30.2 CHRONIC SEASONAL ALLERGIC RHINITIS, UNSPECIFIED TRIGGER: ICD-10-CM

## 2017-08-24 DIAGNOSIS — I10 ESSENTIAL HYPERTENSION: ICD-10-CM

## 2017-08-24 DIAGNOSIS — G47.00 INSOMNIA, UNSPECIFIED TYPE: ICD-10-CM

## 2017-08-24 DIAGNOSIS — E78.5 HYPERLIPIDEMIA LDL GOAL <100: ICD-10-CM

## 2017-08-24 DIAGNOSIS — F41.9 ANXIETY: ICD-10-CM

## 2017-08-24 DIAGNOSIS — F51.01 PRIMARY INSOMNIA: ICD-10-CM

## 2017-08-24 PROCEDURE — 99607 MTMS BY PHARM ADDL 15 MIN: CPT | Performed by: PHARMACIST

## 2017-08-24 PROCEDURE — 99605 MTMS BY PHARM NP 15 MIN: CPT | Performed by: PHARMACIST

## 2017-08-24 RX ORDER — LEVOTHYROXINE SODIUM 75 UG/1
75 TABLET ORAL EVERY MORNING
Qty: 90 TABLET | Refills: 3 | Status: SHIPPED | OUTPATIENT
Start: 2017-08-24 | End: 2018-09-21

## 2017-08-24 RX ORDER — SERTRALINE HYDROCHLORIDE 100 MG/1
100 TABLET, FILM COATED ORAL DAILY
Qty: 90 TABLET | Refills: 3 | Status: SHIPPED | OUTPATIENT
Start: 2017-08-24 | End: 2018-09-21

## 2017-08-24 RX ORDER — GLIMEPIRIDE 1 MG/1
1 TABLET ORAL
Qty: 90 TABLET | Refills: 1 | Status: SHIPPED | OUTPATIENT
Start: 2017-08-24 | End: 2017-10-21

## 2017-08-24 RX ORDER — POTASSIUM CHLORIDE 1500 MG/1
TABLET, EXTENDED RELEASE ORAL
Qty: 180 TABLET | Refills: 3 | Status: SHIPPED | OUTPATIENT
Start: 2017-08-24 | End: 2018-08-12

## 2017-08-24 RX ORDER — PRAVASTATIN SODIUM 80 MG/1
80 TABLET ORAL DAILY
Qty: 90 TABLET | Refills: 3 | Status: SHIPPED | OUTPATIENT
Start: 2017-08-24 | End: 2018-08-12

## 2017-08-24 RX ORDER — TRAZODONE HYDROCHLORIDE 150 MG/1
150 TABLET ORAL AT BEDTIME
Qty: 90 TABLET | Refills: 3 | Status: SHIPPED | OUTPATIENT
Start: 2017-08-24 | End: 2018-07-24

## 2017-08-24 RX ORDER — ELECTROLYTES/DEXTROSE
1 SOLUTION, ORAL ORAL DAILY
COMMUNITY
End: 2020-01-09

## 2017-08-24 RX ORDER — VALACYCLOVIR HYDROCHLORIDE 500 MG/1
TABLET, FILM COATED ORAL
Refills: 0 | COMMUNITY
Start: 2017-08-15 | End: 2021-08-30

## 2017-08-24 ASSESSMENT — PATIENT HEALTH QUESTIONNAIRE - PHQ9: SUM OF ALL RESPONSES TO PHQ QUESTIONS 1-9: 7

## 2017-08-24 NOTE — PROGRESS NOTES
SUBJECTIVE/OBJECTIVE:                                                    Norma F Alpers is a 81 year old female coming in for a follow-up visit for Medication Therapy Management.  She was referred to me from Dr. Schmitt.     First visiti n 2017.    Chief Complaint: Follow up from our visit on 12/26/16. Questions about supplements and GI pain    Tobacco: No tobacco use   Alcohol: not currently using    Medication Adherence: sets up own med box    Diabetes:  Pt currently taking glimepiride 1 mg QAM.  Pt is not experiencing side effects  SMBG: rarely - once every 3 weeks  Ranges (patient reported):  FBG    Symptoms of low blood sugar? shaky, weak - when she doesn't eat. Frequency of hypoglycemia? rarely.  Recent symptoms of high blood sugar? none.  Eye exam: up to date  Foot exam: due  Microalbumin is < 30 mg/g. Pt is taking an ACEi/ARB.  Aspirin: Taking 325mg daily and denies side effects - has been told she needs the higher dose of ASA following her stroke  Diet/Exercise: drinks a protein drink every morning  Lab Results   Component Value Date    A1C 6.3 02/28/2017    A1C 6.8 07/21/2016    A1C 6.4 09/23/2015    A1C 6.3 01/14/2015    A1C 6.2 11/13/2014       GERD: Pt is taking Rantidne prn - drinks water with symptoms and it is okay.   This is effective for her.    Hypertension/Stroke: Current medications include lisinopril/HCTZ 10-12.5 mg daily.  Patient does not self-monitor BP.  Patient reports the following medication side effects: none.    Hyperlipidemia: Current therapy includes pravastatin 80 mg (increased with last labs) and fish oil 1200 mg daily.  Pt reports no significant myalgias or other side effects.   Recent Labs   Lab Test  05/15/17   0940  02/28/17   1036   01/14/15   1105  03/26/14   1156   CHOL  209*  218*   < >  138  153   HDL  55  52   < >  53  46*   LDL  118*  124*   < >  53  76   TRIG  182*  209*   < >  161*  152*   CHOLHDLRATIO   --    --    --   2.6  3.3    < > = values in this interval  not displayed.       Allergies: Taking Cetirizine 10 mg as needed; will take in the morning. Effective when needed.  Has EPI pen to use as needed- bee allergy    Osteopenia: Current therapy includes: Evista 60mg daily and Calcium BID. There is a positive family history of breast cancer - mom, niece.  Pt is not experiencing side effects.  Pt is getting the following sources of dietary calcium: milk  DEXA History: -1.3 from 2011, -2.0 from 10/19/15  High risk: Yes:  Chronic PPI use    Supplements:  Taking vitamin C daily, Glucosamine BID, fish oil, B complex daily, Turmeric daily, MV daily and Preservision daily.   Got a sample of Biotin and asking if she could try for her hair and nails.    Depression/Anxiety:  Taking sertraline 100 mg daily.  passed last year.  Feels the medication has helped.   Pt states she has a support group and network of friends that have been helping.   Having daily stomach pain which she believes is nerves.  PHQ-9 SCORE 6/20/2012 10/9/2012 9/23/2015   Total Score 12 6 -   Total Score - - 16   PHQ9 was 7 today.    Constipation: Taking docusate daily and psyllium 1 pkt daily (just started today).  Started the psyllium to help with GI pain.     Insomnia: Current medications include: trazodone 150 mg. Pt reports no trouble falling asleep and staying asleep. Reports sleeping all day.         Current labs include:  BP Readings from Last 3 Encounters:   08/24/17 119/75   07/17/17 128/80   02/28/17 128/60       GFR Estimate   Date Value Ref Range Status   07/17/2017 80 >60 mL/min/1.7m2 Final   07/17/2017 75 >60 mL/min/1.7m2 Final     Comment:     Non  GFR Calc   02/28/2017 84 >60 mL/min/1.7m2 Final     Comment:     Non  GFR Calc     GFR Estimate If Black   Date Value Ref Range Status   07/17/2017 >90 >60 mL/min/1.7m2 Final   07/17/2017 >90   GFR Calc   >60 mL/min/1.7m2 Final   02/28/2017 >90   GFR Calc   >60 mL/min/1.7m2 Final      TSH   Date Value Ref Range Status   07/21/2016 2.10 0.40 - 4.00 mU/L Final   ]  /75  Pulse 76  Wt 180 lb (81.6 kg)  BMI 30.42 kg/m2    Most Recent Immunizations   Administered Date(s) Administered     Influenza (High Dose) 3 valent vaccine 08/18/2017     Influenza (IIV3) 09/15/2012     Pneumococcal (PCV 13) 08/01/2016     Pneumococcal 23 valent 08/03/2012     TDAP Vaccine (Adacel) 06/20/2012     ASSESSMENT:                                                    Current medications were reviewed with her today.      Medication Adherence: no issues identified    Diabetes:  A1c at goal.  stable    GERD: stable    Hypertension/Stroke: stable    Hyperlipidemia: stable    Allergies: stable    Osteopenia: stable    Supplements:  Reviewed recommend dose of Biotin    Constipation: Pt to start psyllium as recommended, stay on docusate for now    Depression/Anxiety:  PHQ9 above goal, adjusting dose of sertraline may be considered     Insomnia: stable     PLAN:                                                      1.  Okay to try Biotin 2,500 mcg daily    2.  Stay on docusate for now    3.  Follow-up with Dr. Ibrahim once all the results of your GI studies are back. If nothing was found,  Could consider adjusting the dose of sertraline or trying a different medication within that class to help with stress that may be contributing to the stomach pains.      I spent 40 minutes with this patient today.  All changes were made via collaborative practice agreement with Declan Schmitt. A copy of the visit note was provided to the patient's primary care provider.     Will follow up in 6 months.    The patient was given a summary of these recommendations as an after visit summary.    Anel Lubin , Pharm D  [phone] (phone)  [phone] (pager)  Medication Therapy Management Pharmacist

## 2017-08-24 NOTE — PATIENT INSTRUCTIONS
Recommendations from today's MTM visit:                                                    MTM (medication therapy management) is a service provided by a clinical pharmacist designed to help you get the most of out of your medicines.     1.  Okay to try Biotin 2,500 mcg (1 tablet) daily    2.  Stay on docusate for now    3.  Follow-up with Dr. Ibrahim once all the results of your GI studies are back. If nothing was found,  Could consider adjusting the dose of sertraline or trying a different medication within that class to help with stress that may be contributing to the stomach pains.      Next MTM visit: 6 months    To schedule another MTM appointment, please call the clinic directly or you may call the MTM scheduling line at [phone] or toll-free at [phone].     My Clinical Pharmacist's contact information:                                                      It was a pleasure seeing you today!  Please feel free to contact me with any questions or concerns you have.      Anel Lubin , Pharm D  [phone] (phone)  [phone] (pager)  Medication Therapy Management Pharmacist     You may receive a survey about the MTM services you received.  I would appreciate your feedback to help me serve you better in the future. Please fill it out and return it when you can. Your comments will be anonymous.

## 2017-09-20 DIAGNOSIS — I10 BENIGN ESSENTIAL HYPERTENSION: Primary | ICD-10-CM

## 2017-09-20 NOTE — TELEPHONE ENCOUNTER
Lisinopril/HCTZ      Last Written Prescription Date: 09/20/16  Last Fill Quantity: 90, # refills: 3  Last Office Visit with G, [facility] or [facility] prescribing provider: 08/24/17 Pharm D       Potassium   Date Value Ref Range Status   07/17/2017 4.2 3.4 - 5.3 mmol/L Final     Creatinine   Date Value Ref Range Status   07/17/2017 0.74 0.52 - 1.04 mg/dL Final     BP Readings from Last 3 Encounters:   08/24/17 119/75   07/17/17 128/80   02/28/17 128/60

## 2017-09-21 RX ORDER — LISINOPRIL/HYDROCHLOROTHIAZIDE 10-12.5 MG
TABLET ORAL
Qty: 90 TABLET | Refills: 0 | Status: SHIPPED | OUTPATIENT
Start: 2017-09-21 | End: 2017-10-21

## 2017-10-10 ENCOUNTER — TELEPHONE (OUTPATIENT)
Dept: INTERNAL MEDICINE | Facility: CLINIC | Age: 82
End: 2017-10-10

## 2017-10-10 NOTE — TELEPHONE ENCOUNTER
"Pt states in September, she had a blood clot come out into the toilet with urination. She is not sure where it came from. She thinks probably from her vagina. Not from her bottom, wasn't having BM. Clot was about the size of quarter, a little bigger. Bright red blood. Had to wipe with toilet paper about 5-6 times and kept getting blood.   Did see Dr Ibrahim for abdominal pain in July. Had CT scan done, that was negative.     She has upcoming appt with Dr Kennedy on 10/20 and wanted to mention this before the appt. (appt is for diabetes and for stiff neck).     She still has this abd pain but the GI doctor told her it was \"nerves\". She uses Metamucil now.     Advised to call back sooner if bleeding returns. She agrees, otherwise will see Dr Kennedy on 10/20.           "

## 2017-10-11 ENCOUNTER — TRANSFERRED RECORDS (OUTPATIENT)
Dept: HEALTH INFORMATION MANAGEMENT | Facility: CLINIC | Age: 82
End: 2017-10-11

## 2017-10-12 DIAGNOSIS — E11.59 TYPE 2 DIABETES MELLITUS WITH CIRCULATORY DISORDER, WITHOUT LONG-TERM CURRENT USE OF INSULIN (H): ICD-10-CM

## 2017-10-12 DIAGNOSIS — I10 ESSENTIAL HYPERTENSION WITH GOAL BLOOD PRESSURE LESS THAN 140/90: ICD-10-CM

## 2017-10-12 DIAGNOSIS — E78.5 HYPERLIPIDEMIA LDL GOAL <100: ICD-10-CM

## 2017-10-12 LAB — HBA1C MFR BLD: 6.6 % (ref 4.3–6)

## 2017-10-12 PROCEDURE — 84443 ASSAY THYROID STIM HORMONE: CPT | Performed by: INTERNAL MEDICINE

## 2017-10-12 PROCEDURE — 83036 HEMOGLOBIN GLYCOSYLATED A1C: CPT | Performed by: INTERNAL MEDICINE

## 2017-10-12 PROCEDURE — 80061 LIPID PANEL: CPT | Performed by: INTERNAL MEDICINE

## 2017-10-12 PROCEDURE — 80048 BASIC METABOLIC PNL TOTAL CA: CPT | Performed by: INTERNAL MEDICINE

## 2017-10-12 PROCEDURE — 36415 COLL VENOUS BLD VENIPUNCTURE: CPT | Performed by: INTERNAL MEDICINE

## 2017-10-12 PROCEDURE — 82043 UR ALBUMIN QUANTITATIVE: CPT | Performed by: INTERNAL MEDICINE

## 2017-10-13 LAB
ANION GAP SERPL CALCULATED.3IONS-SCNC: 8 MMOL/L (ref 3–14)
BUN SERPL-MCNC: 21 MG/DL (ref 7–30)
CALCIUM SERPL-MCNC: 9.8 MG/DL (ref 8.5–10.1)
CHLORIDE SERPL-SCNC: 106 MMOL/L (ref 94–109)
CHOLEST SERPL-MCNC: 201 MG/DL
CO2 SERPL-SCNC: 26 MMOL/L (ref 20–32)
CREAT SERPL-MCNC: 0.68 MG/DL (ref 0.52–1.04)
CREAT UR-MCNC: 102 MG/DL
GFR SERPL CREATININE-BSD FRML MDRD: 83 ML/MIN/1.7M2
GLUCOSE SERPL-MCNC: 133 MG/DL (ref 70–99)
HDLC SERPL-MCNC: 53 MG/DL
LDLC SERPL CALC-MCNC: 109 MG/DL
MICROALBUMIN UR-MCNC: 15 MG/L
MICROALBUMIN/CREAT UR: 14.9 MG/G CR (ref 0–25)
NONHDLC SERPL-MCNC: 148 MG/DL
POTASSIUM SERPL-SCNC: 3.9 MMOL/L (ref 3.4–5.3)
SODIUM SERPL-SCNC: 140 MMOL/L (ref 133–144)
TRIGL SERPL-MCNC: 193 MG/DL
TSH SERPL DL<=0.005 MIU/L-ACNC: 3.35 MU/L (ref 0.4–4)

## 2017-10-20 ENCOUNTER — OFFICE VISIT (OUTPATIENT)
Dept: INTERNAL MEDICINE | Facility: CLINIC | Age: 82
End: 2017-10-20
Payer: COMMERCIAL

## 2017-10-20 ENCOUNTER — RADIANT APPOINTMENT (OUTPATIENT)
Dept: GENERAL RADIOLOGY | Facility: CLINIC | Age: 82
End: 2017-10-20
Attending: INTERNAL MEDICINE
Payer: COMMERCIAL

## 2017-10-20 VITALS
TEMPERATURE: 98.3 F | HEIGHT: 65 IN | SYSTOLIC BLOOD PRESSURE: 130 MMHG | DIASTOLIC BLOOD PRESSURE: 60 MMHG | WEIGHT: 181 LBS | BODY MASS INDEX: 30.16 KG/M2 | HEART RATE: 83 BPM | OXYGEN SATURATION: 95 %

## 2017-10-20 DIAGNOSIS — M85.80 OSTEOPENIA, UNSPECIFIED LOCATION: ICD-10-CM

## 2017-10-20 DIAGNOSIS — M54.2 CERVICALGIA: ICD-10-CM

## 2017-10-20 DIAGNOSIS — K58.2 IRRITABLE BOWEL SYNDROME WITH BOTH CONSTIPATION AND DIARRHEA: ICD-10-CM

## 2017-10-20 DIAGNOSIS — F32.4 MAJOR DEPRESSIVE DISORDER WITH SINGLE EPISODE, IN PARTIAL REMISSION (H): ICD-10-CM

## 2017-10-20 DIAGNOSIS — I10 BENIGN ESSENTIAL HYPERTENSION: ICD-10-CM

## 2017-10-20 DIAGNOSIS — E11.9 TYPE 2 DIABETES MELLITUS WITHOUT COMPLICATION, WITHOUT LONG-TERM CURRENT USE OF INSULIN (H): Primary | ICD-10-CM

## 2017-10-20 DIAGNOSIS — E78.5 HYPERLIPIDEMIA LDL GOAL <100: ICD-10-CM

## 2017-10-20 PROCEDURE — 99214 OFFICE O/P EST MOD 30 MIN: CPT | Performed by: INTERNAL MEDICINE

## 2017-10-20 PROCEDURE — 72040 X-RAY EXAM NECK SPINE 2-3 VW: CPT

## 2017-10-20 NOTE — MR AVS SNAPSHOT
"              After Visit Summary   10/20/2017    Norma F Alpers    MRN: [id number]           Patient Information     Date Of Birth          10/29/1934        Visit Information        Provider Department      10/20/2017 4:20 PM Adelita Kennedy MD [facility]        Today's Diagnoses     Type 2 diabetes mellitus without complication, without long-term current use of insulin (H)    -  1    Major depressive disorder with single episode, in partial remission (H)        Cervicalgia        Irritable bowel syndrome with both constipation and diarrhea        Benign essential hypertension        Hyperlipidemia LDL goal <100        Osteopenia, unspecified location           Follow-ups after your visit        Future tests that were ordered for you today     Open Future Orders        Priority Expected Expires Ordered    Basic metabolic panel Routine 4/21/2018 5/21/2018 10/21/2017    Hemoglobin A1c Routine 4/21/2018 5/21/2018 10/21/2017            Who to contact     If you have questions or need follow up information about today's clinic visit or your schedule please contact [facility] directly at [phone].  Normal or non-critical lab and imaging results will be communicated to you by Moximedhart, letter or phone within 4 business days after the clinic has received the results. If you do not hear from us within 7 days, please contact the clinic through Ruanggurut or phone. If you have a critical or abnormal lab result, we will notify you by phone as soon as possible.  Submit refill requests through PixelPin or call your pharmacy and they will forward the refill request to us. Please allow 3 business days for your refill to be completed.          Additional Information About Your Visit        Moximedhart Information     PixelPin lets you send messages to your doctor, view your test results, renew your prescriptions, schedule appointments and more. To sign up, go to [email or web address] . Click on \"Log " "in\" on the left side of the screen, which will take you to the Welcome page. Then click on \"Sign up Now\" on the right side of the page.     You will be asked to enter the access code listed below, as well as some personal information. Please follow the directions to create your username and password.     Your access code is: [email or web address]  Expires: 2018 10:24 AM     Your access code will  in 90 days. If you need help or a new code, please call your [facility] clinic or [phone].        Care EveryWhere ID     This is your Care EveryWhere ID. This could be used by other organizations to access your [facility] medical records  [id number]        Your Vitals Were     Pulse Temperature Height Pulse Oximetry Breastfeeding? BMI (Body Mass Index)    83 98.3  F (36.8  C) (Oral) 5' 4.5\" (1.638 m) 95% No 30.59 kg/m2       Blood Pressure from Last 3 Encounters:   10/20/17 130/60   17 119/75   17 128/80    Weight from Last 3 Encounters:   10/20/17 181 lb (82.1 kg)   17 180 lb (81.6 kg)   17 181 lb 8 oz (82.3 kg)              We Performed the Following     DEPRESSION ACTION PLAN (DAP)          Today's Medication Changes          These changes are accurate as of: 10/20/17 11:59 PM.  If you have any questions, ask your nurse or doctor.               These medicines have changed or have updated prescriptions.        Dose/Directions    lisinopril-hydrochlorothiazide 10-12.5 MG per tablet   Commonly known as:  PRINZIDE/ZESTORETIC   This may have changed:  See the new instructions.   Used for:  Benign essential hypertension   Changed by:  Adelita Kennedy MD        Dose:  1 tablet   Take 1 tablet by mouth daily   Quantity:  90 tablet   Refills:  3       raloxifene 60 MG tablet   Commonly known as:  Evista   This may have changed:  See the new instructions.   Used for:  Osteopenia, unspecified location   Changed by:  Adelita Kennedy MD        Dose:  60 mg   Take 1 tablet (60 mg) by mouth daily   Quantity:  " 90 tablet   Refills:  3            Where to get your medicines      These medications were sent to [facility]/pharmacy #[id number] - [place], [place] - [address]  [address], [place] [address]     Phone:  [phone]     glimepiride 1 MG tablet    lisinopril-hydrochlorothiazide 10-12.5 MG per tablet    raloxifene 60 MG tablet                Primary Care Provider Office Phone # Fax #    Adelita Kennedy [phone]       [address] [facility] [address]  [facility] [address]        Equal Access to Services     [facility]: Hadii aad ku hadasho Soomaali, waaxda luqadaha, qaybta kaalmada adeegyada, neto waldron . So [facility] [phone].    ATENCIÓN: Si habla español, tiene a bro disposición servicios gratuitos de asistencia lingüística. [facility] [phone].    We comply with applicable federal civil rights laws and [place] laws. We do not discriminate on the basis of race, color, national origin, age, disability, sex, sexual orientation, or gender identity.            Thank you!     Thank you for choosing [facility]  for your care. Our goal is always to provide you with excellent care. Hearing back from our patients is one way we can continue to improve our services. Please take a few minutes to complete the written survey that you may receive in the mail after your visit with us. Thank you!             Your Updated Medication List - Protect others around you: Learn how to safely use, store and throw away your medicines at www.disposemymeds.org.          This list is accurate as of: 10/20/17 11:59 PM.  Always use your most recent med list.                   Brand Name Dispense Instructions for use Diagnosis    aspirin 325 MG tablet      Take  by mouth daily.        blood glucose monitoring test strip    ACCU-CHEK SMARTVIEW    100 strip    Use to test blood sugar 2-3 times daily or as directed.    Type 2 diabetes mellitus with other circulatory complications        CALCIUM 600 PO      Take 1 tablet by mouth daily 2 times daily        cetirizine 10 MG tablet    zyrTEC     Take 10 mg by mouth daily as needed for allergies        docusate sodium 100 MG tablet    COLACE     Take 100 mg by mouth 2 times daily        EPINEPHrine 0.3 MG/0.3ML injection 2-pack    EPIPEN/ADRENACLICK/or ANY BX GENERIC EQUIV    2 each    Inject 0.3 mLs (0.3 mg) into the muscle once as needed for anaphylaxis    Bee allergy status       glimepiride 1 MG tablet    AMARYL    90 tablet    Take 1 tablet (1 mg) by mouth every morning (before breakfast)    Type 2 diabetes mellitus without complication, without long-term current use of insulin (H)       glucosamine-chondroitinoitin 750-600 MG Tabs      Take 1 tablet by mouth 2 times daily.        levothyroxine 75 MCG tablet    SYNTHROID/LEVOTHROID    90 tablet    Take 1 tablet (75 mcg) by mouth every morning    Acquired hypothyroidism       lisinopril-hydrochlorothiazide 10-12.5 MG per tablet    PRINZIDE/ZESTORETIC    90 tablet    Take 1 tablet by mouth daily    Benign essential hypertension       omega-3 fatty acids 1200 MG capsule      Take 1 capsule by mouth daily.        potassium chloride SA 20 MEQ CR tablet    KLOR-CON    180 tablet    TAKE 1 TABLET (20 MEQ) BY MOUTH 2 TIMES DAILY    Essential hypertension       pravastatin 80 MG tablet    PRAVACHOL    90 tablet    Take 1 tablet (80 mg) by mouth daily    Hyperlipidemia LDL goal <100       * PRESERVISION AREDS 2 PO      Take 1 tablet by mouth daily        * MULTIVITAMIN ADULT Tabs      Take 1 tablet by mouth daily        raloxifene 60 MG tablet    Evista    90 tablet    Take 1 tablet (60 mg) by mouth daily    Osteopenia, unspecified location       ranitidine 150 MG tablet    ZANTAC    60 tablet    Take 1 tablet (150 mg) by mouth 2 times daily as needed for heartburn    Gastroesophageal reflux disease without esophagitis       sertraline 100 MG tablet    ZOLOFT    90 tablet    Take 1 tablet (100 mg) by  mouth daily    Anxiety       thin lancets    NO BRAND SPECIFIED    200 each    1 Device 2 times daily.    Type 2 diabetes, HbA1C goal < 8% (H)       traZODone 150 MG tablet    DESYREL    90 tablet    Take 1 tablet (150 mg) by mouth At Bedtime    Insomnia, unspecified type       TURMERIC PO      Take 2 tablets by mouth daily        valACYclovir 500 MG tablet    VALTREX     TAKE 2 TABLET TWICE A DAY BY MOUTH X 2 DAYS FOR FLARES.        * Notice:  This list has 2 medication(s) that are the same as other medications prescribed for you. Read the directions carefully, and ask your doctor or other care provider to review them with you.

## 2017-10-20 NOTE — NURSING NOTE
"Chief Complaint   Patient presents with     Diabetes     Thyroid Problem     Abdominal Pain       Initial /60 (BP Location: Right arm, Patient Position: Sitting, Cuff Size: Adult Large)  Pulse 83  Temp 98.3  F (36.8  C) (Oral)  Ht 5' 4.5\" (1.638 m)  Wt 181 lb (82.1 kg)  SpO2 95%  Breastfeeding? No  BMI 30.59 kg/m2 Estimated body mass index is 30.59 kg/(m^2) as calculated from the following:    Height as of this encounter: 5' 4.5\" (1.638 m).    Weight as of this encounter: 181 lb (82.1 kg).  Medication Reconciliation: complete   Barbie GAINES      "

## 2017-10-20 NOTE — PROGRESS NOTES
SUBJECTIVE:   Norma F Alpers is a 82 year old female who presents to clinic today for the following health issues:      Diabetes Follow-up      Patient is checking blood sugars: once every other week    Diabetic concerns: None     Symptoms of hypoglycemia (low blood sugar): shaky, dizzy, weak, blurred vision     Paresthesias (numbness or burning in feet) or sores: No     Date of last diabetic eye exam: September 2017    Hypothyroidism Follow-up      Since last visit, patient describes the following symptoms: Weight stable, no hair loss, no skin changes, no constipation, no loose stools, dry skin and loose stools        Amount of exercise or physical activity: None    Problems taking medications regularly: No    Medication side effects: none    Diet: regular (no restrictions), low salt and low fat/cholesterol        Other problems:   1. Abdominal pain: she had negative CT, worked up by GI and thought to be IBS with constipation/retained stool and diarrhea around. She was advised to take metamucil and she started this but it did not help. She took it only a short time. She has lower abdominal pain, cramping and bloating.   2. Depression: some improvement in symptoms, GI thinks may be a factor, she has declined medication.   3. Hyperlipidemia: on medication without side effects   4. HTN; controlled        Current concerns:   1. Neck pain and headaches: she has had symptoms for about 6 weeks. She first noted stiff, sore neck. This is center and right, most of the neck. It is aching, hurts with movement. It is more stiff in am. She does not recall any injury or unusual activity. She has been having headaches since then that start posteriorly and radiate forward. The headaches come and go but are present daily. The neck pain is constant.   2. She had a red clot in the toilet when urinated a month ago. There was some bright red blood on the paper with wiping; was not from rectum. She has not had any blood since then. She  wears a pad for urinary leakage.     Patient Active Problem List   Diagnosis     Advanced directives, counseling/discussion     Essential hypertension with goal blood pressure less than 140/90     Hyperlipidemia LDL goal <100     GERD (gastroesophageal reflux disease)     Aphasic stroke     Osteopenia     Hypothyroidism     Anxiety     Thyroid nodule     Type 2 diabetes mellitus with other circulatory complications       Current Outpatient Prescriptions   Medication Sig Dispense Refill     lisinopril-hydrochlorothiazide (PRINZIDE/ZESTORETIC) 10-12.5 MG per tablet TAKE 1 TABLET BY MOUTH DAILY 90 tablet 0     Multiple Vitamins-Minerals (PRESERVISION AREDS 2 PO) Take 1 tablet by mouth daily       Multiple Vitamins-Minerals (MULTIVITAMIN ADULT) TABS Take 1 tablet by mouth daily       levothyroxine (SYNTHROID/LEVOTHROID) 75 MCG tablet Take 1 tablet (75 mcg) by mouth every morning 90 tablet 3     sertraline (ZOLOFT) 100 MG tablet Take 1 tablet (100 mg) by mouth daily 90 tablet 3     potassium chloride SA (POTASSIUM CHLORIDE) 20 MEQ CR tablet TAKE 1 TABLET (20 MEQ) BY MOUTH 2 TIMES DAILY 180 tablet 3     glimepiride (AMARYL) 1 MG tablet Take 1 tablet (1 mg) by mouth every morning (before breakfast) 90 tablet 1     traZODone (DESYREL) 150 MG tablet Take 1 tablet (150 mg) by mouth At Bedtime 90 tablet 3     pravastatin (PRAVACHOL) 80 MG tablet Take 1 tablet (80 mg) by mouth daily 90 tablet 3     raloxifene (EVISTA) 60 MG tablet TAKE 1 TABLET (60 MG) BY MOUTH DAILY 90 tablet 0     blood glucose monitoring (ACCU-CHEK SMARTVIEW) test strip Use to test blood sugar 2-3 times daily or as directed. 100 strip prn     EPINEPHrine (EPIPEN) 0.3 MG/0.3ML injection Inject 0.3 mLs (0.3 mg) into the muscle once as needed for anaphylaxis 2 each 5     ranitidine (ZANTAC) 150 MG tablet Take 1 tablet (150 mg) by mouth 2 times daily as needed for heartburn 60 tablet 5     cetirizine (ZYRTEC) 10 MG tablet Take 10 mg by mouth daily as needed for  "allergies       docusate sodium (COLACE) 100 MG tablet Take 100 mg by mouth 2 times daily       TURMERIC PO Take 2 tablets by mouth daily        lancets thin MISC 1 Device 2 times daily. 200 each prn     aspirin 325 MG tablet Take  by mouth daily.       glucosamine-chondroitinoitin 750-600 MG TABS Take 1 tablet by mouth 2 times daily.         omega-3 fatty acids (FISH OIL) 1200 MG capsule Take 1 capsule by mouth daily.       Calcium Carbonate (CALCIUM 600 PO) Take 1 tablet by mouth daily 2 times daily       valACYclovir (VALTREX) 500 MG tablet TAKE 2 TABLET TWICE A DAY BY MOUTH X 2 DAYS FOR FLARES.  0       Social History   Substance Use Topics     Smoking status: Former Smoker     Quit date: 3/27/1975     Smokeless tobacco: Never Used     Alcohol use Yes      Comment: occasionally        ROS:  General: no fever, chills  Weight: stable  Vision:negative. Last eye exam 9/17.  ENT: negative  Respiratory negative.  Cardiac: no chest pain or pressure  Abdominal: no nausea, vomiting, abdominal pain, bowel changes  Vascular no complaints of claudication  Neurologic:no complaints of neuropathy  Feet no lesions, in grown nails, edema   : no polyuria, hematuria, dysuria    No arm pain, numbness, tingling or weakness of arms, no gait changes.       Objective:  Patient alert in NAD  /60 (BP Location: Right arm, Patient Position: Sitting, Cuff Size: Adult Large)  Pulse 83  Temp 98.3  F (36.8  C) (Oral)  Ht 5' 4.5\" (1.638 m)  Wt 181 lb (82.1 kg)  SpO2 95%  Breastfeeding? No  BMI 30.59 kg/m2       Wt Readings from Last 4 Encounters:   10/20/17 181 lb (82.1 kg)   08/24/17 180 lb (81.6 kg)   07/17/17 181 lb 8 oz (82.3 kg)   02/28/17 181 lb (82.1 kg)       Neck: tender at spine and lateral muscles, R>L, mild decrease ROM with pain,   DTRs upper ext 2/4  Strength intact  CV: CV: normal S1, S2 without murmur, S3 or S4.  Carotid pulses: full  LUNGS: clear  Abdomen: Bowel sounds normal, soft, diffuse tenderness lower " "abdomen, No hepatosplenomegaly. No masses.       Lab Results   Component Value Date    A1C 6.6 10/12/2017    A1C 6.3 02/28/2017    A1C 6.8 07/21/2016    A1C 6.4 09/23/2015    A1C 6.3 01/14/2015     Lab Results   Component Value Date    CHOL 201 10/12/2017    HDL 53 10/12/2017     10/12/2017    TRIG 193 10/12/2017    CHOLHDLRATIO 2.6 01/14/2015     PHQ-9 SCORE 10/9/2012 9/23/2015 8/24/2017   Total Score 6 - -   Total Score - 16 7        ASSESSMENT/PLAN:         BMI:   Estimated body mass index is 30.59 kg/(m^2) as calculated from the following:    Height as of this encounter: 5' 4.5\" (1.638 m).    Weight as of this encounter: 181 lb (82.1 kg).   Weight management plan: Discussed healthy diet and exercise guidelines and patient will follow up in 6 months in clinic to re-evaluate.        1. Type 2 diabetes mellitus without complication, without long-term current use of insulin (H)  Well controlled, continue diet  - Basic metabolic panel; Future  - Hemoglobin A1c; Future    2. Major depressive disorder with single episode, in partial remission (H)  Overall stable, declines treatment    3. Cervicalgia  Advised on neck care, check xrays, consider PT, may use tylenol, heat  - XR Cervical Spine 2/3 Views; Future    4. Irritable bowel syndrome with both constipation and diarrhea  Discussed reports by GI, causes of IBS. Recommend restart metamucil very low dose and work up gradually, consider miralax    5. Benign essential hypertension  Controlled, continue med    6. Hyperlipidemia LDL goal <100  Stable, continue current med dowse.       Adelita Kennedy MD  [facility]    "

## 2017-10-21 PROBLEM — F32.4 MAJOR DEPRESSIVE DISORDER WITH SINGLE EPISODE, IN PARTIAL REMISSION (H): Status: ACTIVE | Noted: 2017-10-21

## 2017-10-21 PROBLEM — K58.2 IRRITABLE BOWEL SYNDROME WITH BOTH CONSTIPATION AND DIARRHEA: Status: ACTIVE | Noted: 2017-10-21

## 2017-10-21 RX ORDER — RALOXIFENE HYDROCHLORIDE 60 MG/1
60 TABLET, FILM COATED ORAL DAILY
Qty: 90 TABLET | Refills: 3 | Status: SHIPPED | OUTPATIENT
Start: 2017-10-21 | End: 2018-10-25

## 2017-10-21 RX ORDER — GLIMEPIRIDE 1 MG/1
1 TABLET ORAL
Qty: 90 TABLET | Refills: 3 | Status: SHIPPED | OUTPATIENT
Start: 2017-10-21 | End: 2018-10-25

## 2017-10-21 RX ORDER — LISINOPRIL/HYDROCHLOROTHIAZIDE 10-12.5 MG
1 TABLET ORAL DAILY
Qty: 90 TABLET | Refills: 3 | Status: SHIPPED | OUTPATIENT
Start: 2017-10-21 | End: 2019-01-03

## 2017-10-30 ENCOUNTER — THERAPY VISIT (OUTPATIENT)
Dept: PHYSICAL THERAPY | Facility: CLINIC | Age: 82
End: 2017-10-30
Payer: COMMERCIAL

## 2017-10-30 DIAGNOSIS — M54.2 CERVICALGIA: Primary | ICD-10-CM

## 2017-10-30 PROCEDURE — 97110 THERAPEUTIC EXERCISES: CPT | Mod: GP | Performed by: PHYSICAL THERAPIST

## 2017-10-30 PROCEDURE — 97140 MANUAL THERAPY 1/> REGIONS: CPT | Mod: GP | Performed by: PHYSICAL THERAPIST

## 2017-10-30 PROCEDURE — 97161 PT EVAL LOW COMPLEX 20 MIN: CPT | Mod: GP | Performed by: PHYSICAL THERAPIST

## 2017-10-30 NOTE — PROGRESS NOTES
Subjective:    HPI                    Objective:    System    Physical Exam    General     ROS     Physical Therapy Initial Evaluation:   Oct 30, 2017  Subjective:   Chief Complaint:    Pain: back of the neck and the head. Gets headaches that are all over, but mostly in the back of the head.    Numbness/Tingling: None   Weakness: None   Stiffness: Neck  New/Recurrent/Chronic: New  DOI/onset: Mid-September 2017   Referral Date: 10/23/2017  Mechanism of onset: unknown, suddenly while on a trip to [place]  PMH/surgical history/trauma:    - Diabetes   - Stroke (left side affected)   - HTN   - Thyroid Problems   - Hx of surgeries, but none in the head/neck region  General health as reported by patient: Good    Medications: Thyroid, HTN, bone density, Diabetes  Occupation: Reitred  Previous Treatment (Effect): None  Imaging: X-ray: IMPRESSION: Cervical vertebra are visible to the C7 level. Trace retrolisthesis of C5 relative to C4 and C6. Moderate to severe intervertebral disc space narrowing at C5-C6 with moderate narrowing at C6-C7. No prevertebral soft tissue swelling. Moderate calcific plaque in the left carotid bulb. No definite acute fracture.  AM/PM: Same all day  Quality of Pain: gnawing - not really a pain  Pain: 7/10 at present, 0/10 at best, 8/10 at worst  Better: heat,   Worse: reading in her lap,   Progression of Symptoms since onset: little better   Sleeping: No disturbance   Other current functional challenges: looking down/reading, looking up, turning to the right  Current Functional Status: looking down/reading - pain with looking down and reading ; turning to the right - Pain and stiffness with turning to the right.   Previous Functional Status: No restrictions  Current HEP/exercise regimen: None  Hand/Leg Dominance: Right handed  Red Flags:   - Patient denies the following: Pain with Cough / Sneeze / Laughing ; Night Pain ; Fever ; Weakness ; Numbness/Tingling ; Chest Pain ; Unexplained Weight Loss ;  Vision Change (has macular degeneration)    Patient's Goal(s): see if we can get the kink out.         Objective:    Posture: kyphotic posture     Shoulder Screen: Negative    AROM: (Major, Moderate, Minimal or Nil loss)  Movement Loss Kevan Mod Min Nil Pain   Flexion    x    Extension   x     Left Rotation  x      Right Rotation   x     Left Side Bending   x     Right Side bending  x   Lower cervical pattern   Retraction x x        Cervical PIVMs: (R- right, L - left; U - upglide, D - downglide)  Level Hypo Pain   C1/2     C2/3     C3/4 R UD cc   C4/5     C5/6     C6/7     C7/T1       Deep Neck Flexor Activation: Able to activate, but activation is minimal    Other:   - Relief with sub-occipital release  - Relief with upper trapezius stretch        Assessment/Plan:      Patient is a 83 year old female with cervical complaints.    Patient has the following significant findings with corresponding treatment plan.                Diagnosis 1:  Cervicalgia  Pain -  hot/cold therapy, manual therapy, splint/taping/bracing/orthotics, self management, education and home program  Decreased ROM/flexibility - manual therapy, therapeutic exercise, therapeutic activity and home program  Decreased joint mobility - manual therapy, therapeutic exercise, therapeutic activity and home program  Decreased function - therapeutic activities and home program  Impaired posture - neuro re-education, therapeutic activities and home program    Therapy Evaluation Codes:   1) History comprised of:   Personal factors that impact the plan of care:      Living environment and Memory Issues.    Comorbidity factors that impact the plan of care are:      Diabetes and Stroke.     Medications impacting care: Diabetes.  2) Examination of Body Systems comprised of:   Body structures and functions that impact the plan of care:      Cervical spine.   Activity limitations that impact the plan of care are:      Driving, Reading/Computer work and Looking in all  directions.  3) Clinical presentation characteristics are:   Stable/Uncomplicated.  4) Decision-Making    Low complexity using standardized patient assessment instrument and/or measureable assessment of functional outcome.  Cumulative Therapy Evaluation is: Low complexity.    Previous and current functional limitations:  (See Goal Flow Sheet for this information)    Short term and Long term goals: (See Goal Flow Sheet for this information)     Communication ability:  Patient appears to be able to clearly communicate and understand verbal and written communication and follow directions correctly.  Treatment Explanation - The following has been discussed with the patient:   RX ordered/plan of care  Anticipated outcomes  Possible risks and side effects  This patient would benefit from PT intervention to resume normal activities.   Rehab potential is good.    Frequency:  1 X week, once daily  Duration:  for 4 weeks tapering to 2 X a month over 4 weeks  Discharge Plan:  Achieve all LTG.  Independent in home treatment program.  Reach maximal therapeutic benefit.    Please refer to the daily flowsheet for treatment today, total treatment time and time spent performing 1:1 timed codes.

## 2017-11-07 ENCOUNTER — TELEPHONE (OUTPATIENT)
Dept: PHARMACY | Facility: CLINIC | Age: 82
End: 2017-11-07

## 2017-11-07 DIAGNOSIS — E11.9 TYPE 2 DIABETES MELLITUS WITHOUT COMPLICATION, WITHOUT LONG-TERM CURRENT USE OF INSULIN (H): Primary | ICD-10-CM

## 2017-11-07 NOTE — TELEPHONE ENCOUNTER
Lata called to report her blood glucose meter is not working - will send prescription for a new Accu-Chek Lorenza to see if we can get a new one covered.    Anel Lubin , Pharm D  [phone] (phone)  [phone] (pager)  Medication Therapy Management Pharmacist

## 2017-11-08 ENCOUNTER — THERAPY VISIT (OUTPATIENT)
Dept: PHYSICAL THERAPY | Facility: CLINIC | Age: 82
End: 2017-11-08
Payer: COMMERCIAL

## 2017-11-08 DIAGNOSIS — M54.2 CERVICALGIA: ICD-10-CM

## 2017-11-08 PROCEDURE — 97112 NEUROMUSCULAR REEDUCATION: CPT | Mod: GP | Performed by: PHYSICAL THERAPIST

## 2017-11-08 PROCEDURE — 97140 MANUAL THERAPY 1/> REGIONS: CPT | Mod: GP | Performed by: PHYSICAL THERAPIST

## 2017-11-13 ENCOUNTER — TELEPHONE (OUTPATIENT)
Dept: PHYSICAL THERAPY | Facility: CLINIC | Age: 82
End: 2017-11-13

## 2017-11-13 DIAGNOSIS — M54.2 CERVICALGIA: ICD-10-CM

## 2017-11-13 NOTE — TELEPHONE ENCOUNTER
Patient called to report that she is leaving town for the holidays and will be cancelling future appointments. She wanted to say thank you for the help with the neck. It is now to a point where it is manageable and she feels that she will be okay without additional PT.     Please reference last visit on 11/8/2017 for discharge note.

## 2017-11-13 NOTE — PROGRESS NOTES
Subjective:    HPI                    Objective:    System    Physical Exam    General     ROS    Assessment/Plan:      DISCHARGE REPORT    Progress reporting period is from 10/30/2017 to 11/8/2017.       SUBJECTIVE  Subjective changes noted by patient:  Patient called to report that her pain is better and manageable without additional therapy. She is headed out of town for the holidays and will cancel all appointments currently scheduled.   Current Pain level: 5/10.     Initial Pain level: 8/10.   Changes in function:  Yes, Patient reports doing better. Specific changes unknown  Adverse reaction to treatment or activity: None    OBJECTIVE  Changes noted in objective findings:  See below for most current objective findings  Objective: Cx ROM: R Rot - min; L Rot - mod-min ; ext - min; flx - WNL. Feel tight on the right. Poor recollection of exercises. Relief of symptoms following MT to UT/SOR.      ASSESSMENT/PLAN  Updated problem list and treatment plan: Diagnosis 1:  Cerivicalgia  STG/LTGs have been met or progress has been made towards goals:  Unknown, patient has failed to return to therapy  Assessment of Progress: The patient's condition is improving.  Self Management Plans:  Patient has been instructed in a home treatment program.  Patient  has been instructed in self management of symptoms.  Lata continues to require the following intervention to meet STG and LTG's:  PT intervention is no longer required to meet STG/LTG.    Recommendations:  This patient is ready to be discharged from therapy and continue their home treatment program.    Patient reports that she is doing well and no longer needs additional PT to manage her symptoms.     Please refer to the daily flowsheet for treatment today, total treatment time and time spent performing 1:1 timed codes.

## 2017-12-20 ENCOUNTER — TRANSFERRED RECORDS (OUTPATIENT)
Dept: HEALTH INFORMATION MANAGEMENT | Facility: CLINIC | Age: 82
End: 2017-12-20

## 2018-03-05 ENCOUNTER — TELEPHONE (OUTPATIENT)
Dept: PHARMACY | Facility: CLINIC | Age: 83
End: 2018-03-05

## 2018-03-05 NOTE — TELEPHONE ENCOUNTER
Called Lata to follow-up.  She had a question about Prevagen for memory.  No interactions with her other meds, no safety concerns.  Evidence of the benefit of Prevagen is very weak.  If she wanted to try it she could, otherwise encouraged follow-up with Dr. Kennedy to discuss concerns with memory and possible therapy.    Anel Lubin , Pharm D  [phone] (phone)  [phone] (pager)  Medication Therapy Management Pharmacist

## 2018-03-29 ENCOUNTER — TRANSFERRED RECORDS (OUTPATIENT)
Dept: HEALTH INFORMATION MANAGEMENT | Facility: CLINIC | Age: 83
End: 2018-03-29

## 2018-04-16 ENCOUNTER — TRANSFERRED RECORDS (OUTPATIENT)
Dept: HEALTH INFORMATION MANAGEMENT | Facility: CLINIC | Age: 83
End: 2018-04-16

## 2018-05-01 ENCOUNTER — OFFICE VISIT (OUTPATIENT)
Dept: INTERNAL MEDICINE | Facility: CLINIC | Age: 83
End: 2018-05-01
Payer: COMMERCIAL

## 2018-05-01 VITALS
WEIGHT: 182 LBS | RESPIRATION RATE: 16 BRPM | SYSTOLIC BLOOD PRESSURE: 108 MMHG | TEMPERATURE: 97.9 F | OXYGEN SATURATION: 94 % | HEART RATE: 82 BPM | DIASTOLIC BLOOD PRESSURE: 70 MMHG | BODY MASS INDEX: 30.76 KG/M2

## 2018-05-01 DIAGNOSIS — E78.5 HYPERLIPIDEMIA LDL GOAL <100: ICD-10-CM

## 2018-05-01 DIAGNOSIS — F32.4 MAJOR DEPRESSIVE DISORDER WITH SINGLE EPISODE, IN PARTIAL REMISSION (H): ICD-10-CM

## 2018-05-01 DIAGNOSIS — E11.9 TYPE 2 DIABETES MELLITUS WITHOUT COMPLICATION, WITHOUT LONG-TERM CURRENT USE OF INSULIN (H): Primary | ICD-10-CM

## 2018-05-01 DIAGNOSIS — I10 BENIGN ESSENTIAL HYPERTENSION: ICD-10-CM

## 2018-05-01 DIAGNOSIS — E03.9 ACQUIRED HYPOTHYROIDISM: ICD-10-CM

## 2018-05-01 DIAGNOSIS — J01.00 ACUTE NON-RECURRENT MAXILLARY SINUSITIS: ICD-10-CM

## 2018-05-01 LAB — HBA1C MFR BLD: 6.6 % (ref 0–5.6)

## 2018-05-01 PROCEDURE — 80048 BASIC METABOLIC PNL TOTAL CA: CPT | Performed by: INTERNAL MEDICINE

## 2018-05-01 PROCEDURE — 99214 OFFICE O/P EST MOD 30 MIN: CPT | Performed by: INTERNAL MEDICINE

## 2018-05-01 PROCEDURE — 36415 COLL VENOUS BLD VENIPUNCTURE: CPT | Performed by: INTERNAL MEDICINE

## 2018-05-01 PROCEDURE — 83036 HEMOGLOBIN GLYCOSYLATED A1C: CPT | Performed by: INTERNAL MEDICINE

## 2018-05-01 RX ORDER — AZITHROMYCIN 250 MG/1
TABLET, FILM COATED ORAL
Qty: 6 TABLET | Refills: 0 | Status: SHIPPED | OUTPATIENT
Start: 2018-05-01 | End: 2018-08-06

## 2018-05-01 NOTE — MR AVS SNAPSHOT
"              After Visit Summary   5/1/2018    Norma F Alpers    MRN: [id number]           Patient Information     Date Of Birth          10/29/1934        Visit Information        Provider Department      5/1/2018 2:40 PM Adelita Kennedy MD [facility]        Today's Diagnoses     Type 2 diabetes mellitus without complication, without long-term current use of insulin (H)    -  1    Major depressive disorder with single episode, in partial remission (H)        Acute non-recurrent maxillary sinusitis        Benign essential hypertension        Hyperlipidemia LDL goal <100        Acquired hypothyroidism           Follow-ups after your visit        Who to contact     If you have questions or need follow up information about today's clinic visit or your schedule please contact [facility] directly at [phone].  Normal or non-critical lab and imaging results will be communicated to you by TiVohart, letter or phone within 4 business days after the clinic has received the results. If you do not hear from us within 7 days, please contact the clinic through TiVohart or phone. If you have a critical or abnormal lab result, we will notify you by phone as soon as possible.  Submit refill requests through Red[employer] Data or call your pharmacy and they will forward the refill request to us. Please allow 3 business days for your refill to be completed.          Additional Information About Your Visit        MyChart Information     [employer] lets you send messages to your doctor, view your test results, renew your prescriptions, schedule appointments and more. To sign up, go to [email or web address] . Click on \"Log in\" on the left side of the screen, which will take you to the Welcome page. Then click on \"Sign up Now\" on the right side of the page.     You will be asked to enter the access code listed below, as well as some personal information. Please follow the directions to create your username and " password.     Your access code is: [email or web address]  Expires: 2018  9:38 AM     Your access code will  in 90 days. If you need help or a new code, please call your [facility] or [phone].        Care EveryWhere ID     This is your Care EveryWhere ID. This could be used by other organizations to access your [facility] medical records  [id number]        Your Vitals Were     Pulse Temperature Respirations Pulse Oximetry BMI (Body Mass Index)       82 97.9  F (36.6  C) (Oral) 16 94% 30.76 kg/m2        Blood Pressure from Last 3 Encounters:   18 108/70   10/20/17 130/60   17 119/75    Weight from Last 3 Encounters:   18 182 lb (82.6 kg)   10/20/17 181 lb (82.1 kg)   17 180 lb (81.6 kg)              We Performed the Following     Basic metabolic panel     Hemoglobin A1c          Today's Medication Changes          These changes are accurate as of 18 11:59 PM.  If you have any questions, ask your nurse or doctor.               Start taking these medicines.        Dose/Directions    azithromycin 250 MG tablet   Commonly known as:  ZITHROMAX   Used for:  Acute non-recurrent maxillary sinusitis   Started by:  Adelita Kennedy MD        Two tablets first day, then one tablet daily for four days.   Quantity:  6 tablet   Refills:  0         Stop taking these medicines if you haven't already. Please contact your care team if you have questions.     docusate sodium 100 MG tablet   Commonly known as:  COLACE   Stopped by:  Adelita Kennedy MD                Where to get your medicines      These medications were sent to [id number] [place], [place] - [address], [place] [address]     Phone:  [phone]     azithromycin 250 MG tablet                Primary Care Provider Office Phone # Fax #    Adelita Kennedy [phone]       [facility] [address] [place] [address]        Equal Access to Services     STAS CÁRDENAS : Ria eisenberg  pam Mix, waeloisada lusudhiradaha, qaybta kapresley brennan, neto mickyin hayaan mirnalyn asanicholas laelvinlilliana ankur. So [facility] [phone].    ATENCIÓN: Si habla rejiañol, tiene a bro disposición servicios gratuitos de asistencia lingüística. Kyree al [phone].    We comply with applicable federal civil rights laws and [place] laws. We do not discriminate on the basis of race, color, national origin, age, disability, sex, sexual orientation, or gender identity.            Thank you!     Thank you for choosing [facility]  for your care. Our goal is always to provide you with excellent care. Hearing back from our patients is one way we can continue to improve our services. Please take a few minutes to complete the written survey that you may receive in the mail after your visit with us. Thank you!             Your Updated Medication List - Protect others around you: Learn how to safely use, store and throw away your medicines at www.disposemymeds.org.          This list is accurate as of 5/1/18 11:59 PM.  Always use your most recent med list.                   Brand Name Dispense Instructions for use Diagnosis    aspirin 325 MG tablet      Take  by mouth daily.        azithromycin 250 MG tablet    ZITHROMAX    6 tablet    Two tablets first day, then one tablet daily for four days.    Acute non-recurrent maxillary sinusitis       blood glucose monitoring meter device kit     1 kit    Use to test blood sugar 1-2 times daily or as directed.  Ok to substitute alternative if insurance prefers.    Type 2 diabetes mellitus without complication, without long-term current use of insulin (H)       blood glucose monitoring test strip    ACCU-CHEK SMARTVIEW    100 strip    Use to test blood sugar 2-3 times daily or as directed.    Type 2 diabetes mellitus with other circulatory complications       CALCIUM 600 PO      Take 1 tablet by mouth daily 2 times daily        cetirizine 10 MG tablet    zyrTEC     Take 10 mg by  mouth daily as needed for allergies        EPINEPHrine 0.3 MG/0.3ML injection 2-pack    EPIPEN/ADRENACLICK/or ANY BX GENERIC EQUIV    2 each    Inject 0.3 mLs (0.3 mg) into the muscle once as needed for anaphylaxis    Bee allergy status       glimepiride 1 MG tablet    AMARYL    90 tablet    Take 1 tablet (1 mg) by mouth every morning (before breakfast)    Type 2 diabetes mellitus without complication, without long-term current use of insulin (H)       glucosamine-chondroitinoitin 750-600 MG Tabs      Take 1 tablet by mouth 2 times daily.        levothyroxine 75 MCG tablet    SYNTHROID/LEVOTHROID    90 tablet    Take 1 tablet (75 mcg) by mouth every morning    Acquired hypothyroidism       lisinopril-hydrochlorothiazide 10-12.5 MG per tablet    PRINZIDE/ZESTORETIC    90 tablet    Take 1 tablet by mouth daily    Benign essential hypertension       omega-3 fatty acids 1200 MG capsule      Take 1 capsule by mouth daily.        potassium chloride SA 20 MEQ CR tablet    KLOR-CON    180 tablet    TAKE 1 TABLET (20 MEQ) BY MOUTH 2 TIMES DAILY    Essential hypertension       pravastatin 80 MG tablet    PRAVACHOL    90 tablet    Take 1 tablet (80 mg) by mouth daily    Hyperlipidemia LDL goal <100       * PRESERVISION AREDS 2 PO      Take 1 tablet by mouth daily        * MULTIVITAMIN ADULT Tabs      Take 1 tablet by mouth daily        psyllium 58.6 % Powd    METAMUCIL     Take by mouth daily        raloxifene 60 MG tablet    Evista    90 tablet    Take 1 tablet (60 mg) by mouth daily    Osteopenia, unspecified location       ranitidine 150 MG tablet    ZANTAC    60 tablet    Take 1 tablet (150 mg) by mouth 2 times daily as needed for heartburn    Gastroesophageal reflux disease without esophagitis       sertraline 100 MG tablet    ZOLOFT    90 tablet    Take 1 tablet (100 mg) by mouth daily    Anxiety       thin lancets    NO BRAND SPECIFIED    200 each    1 Device 2 times daily.    Type 2 diabetes, HbA1C goal < 8% (H)        traZODone 150 MG tablet    DESYREL    90 tablet    Take 1 tablet (150 mg) by mouth At Bedtime    Insomnia, unspecified type       TURMERIC PO      Take 2 tablets by mouth daily        valACYclovir 500 MG tablet    VALTREX     TAKE 2 TABLET TWICE A DAY BY MOUTH X 2 DAYS FOR FLARES.        vitamin B complex with vitamin C Tabs tablet      Take 1 tablet by mouth daily        * Notice:  This list has 2 medication(s) that are the same as other medications prescribed for you. Read the directions carefully, and ask your doctor or other care provider to review them with you.

## 2018-05-01 NOTE — PROGRESS NOTES
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  Yes, no muscle aches from statin    Other lipid medications/supplements?:  none    Hypertension Follow-up      Outpatient blood pressures are not being checked.    Low Salt Diet: no added salt    BP Readings from Last 2 Encounters:   10/20/17 130/60   08/24/17 119/75     Hemoglobin A1C (%)   Date Value   10/12/2017 6.6 (H)   02/28/2017 6.3 (H)     LDL Cholesterol Calculated (mg/dL)   Date Value   10/12/2017 109 (H)   05/15/2017 118 (H)     Hypothyroidism Follow-up      Since last visit, patient describes the following symptoms: Weight stable, no hair loss, no skin changes, no constipation, no loose stools      Amount of exercise or physical activity: Hand exercises    Problems taking medications regularly: No    Medication side effects: none    Diet: low salt        Other problems:   CVA: no new symptoms   Major depression: lacks motivation, symptoms are overall stable she does not wish to change medication  back pain: She recently had an injection of the back done, will be following up with the orthopedist in a few weeks.    Current concerns:   URI: She had acute onset of laryngitis with mild sore throat over a month ago.  She then developed more cough, nasal congestion, mild dyspnea on exertion.  Some of her symptoms improved but she has had chronic ongoing pressure behind the eyes, postnasal drainage, significant rhinorrhea.  She continues to  have nasal congestion.  She reports some mild itchy eyes.  She started on some Flonase last week and is taking Zyrtec.  She has not been taking anything else for her symptoms.        Patient Active Problem List   Diagnosis     Advanced directives, counseling/discussion     Benign essential hypertension     Hyperlipidemia LDL goal <100     GERD (gastroesophageal reflux disease)     Aphasic stroke     Osteopenia     Acquired hypothyroidism     Anxiety     Thyroid nodule     Type 2 diabetes mellitus without complication, without long-term current use of insulin (H)     Irritable bowel syndrome with both constipation and diarrhea     Major depressive disorder with single episode, in partial remission (H)       Current Outpatient Prescriptions   Medication Sig Dispense Refill     aspirin 325 MG tablet Take  by mouth daily.       Calcium Carbonate (CALCIUM 600 PO) Take 1 tablet by mouth daily 2 times daily       cetirizine (ZYRTEC) 10 MG tablet Take 10 mg by mouth daily as needed for allergies       EPINEPHrine (EPIPEN) 0.3 MG/0.3ML injection Inject 0.3 mLs (0.3 mg) into the muscle once as needed for anaphylaxis 2 each 5     glimepiride (AMARYL) 1 MG tablet Take 1 tablet (1 mg) by mouth every morning (before breakfast) 90 tablet 3     glucosamine-chondroitinoitin 750-600 MG TABS Take 1 tablet by mouth 2 times daily.         levothyroxine (SYNTHROID/LEVOTHROID) 75 MCG tablet Take 1 tablet (75 mcg) by mouth every morning 90 tablet 3     lisinopril-hydrochlorothiazide (PRINZIDE/ZESTORETIC) 10-12.5 MG per tablet Take 1 tablet by mouth daily 90 tablet 3     Multiple Vitamins-Minerals (MULTIVITAMIN ADULT) TABS Take 1 tablet by mouth daily       Multiple Vitamins-Minerals (PRESERVISION AREDS 2 PO) Take 1 tablet by mouth daily       omega-3 fatty acids (FISH OIL) 1200 MG capsule Take 1 capsule by mouth daily.       potassium chloride SA (POTASSIUM CHLORIDE) 20 MEQ CR tablet TAKE 1 TABLET (20 MEQ) BY MOUTH 2 TIMES DAILY 180 tablet  3     pravastatin (PRAVACHOL) 80 MG tablet Take 1 tablet (80 mg) by mouth daily 90 tablet 3     psyllium (METAMUCIL) 58.6 % POWD Take by mouth daily       raloxifene (EVISTA) 60 MG tablet Take 1 tablet (60 mg) by mouth daily 90 tablet 3     ranitidine (ZANTAC) 150 MG tablet Take 1 tablet (150 mg) by mouth 2 times daily as needed for heartburn 60 tablet 5     sertraline (ZOLOFT) 100 MG tablet Take 1 tablet (100 mg) by mouth daily 90 tablet 3     traZODone (DESYREL) 150 MG tablet Take 1 tablet (150 mg) by mouth At Bedtime 90 tablet 3     TURMERIC PO Take 2 tablets by mouth daily        valACYclovir (VALTREX) 500 MG tablet TAKE 2 TABLET TWICE A DAY BY MOUTH X 2 DAYS FOR FLARES.  0     vitamin B complex with vitamin C (VITAMIN  B COMPLEX) TABS tablet Take 1 tablet by mouth daily         Social History   Substance Use Topics     Smoking status: Former Smoker     Quit date: 3/27/1975     Smokeless tobacco: Never Used     Alcohol use Yes      Comment: occasionally        ROS:  General: no fever, chills  Weight: stable  Vision:stable MD. Last eye exam 3/1/18.  ENT: as above  Respiratory as above.  Cardiac: no chest pain or pressure  Abdominal: no nausea, vomiting, abdominal pain, bowel changes  Vascular no complaints of claudication  Neurologic:some complaints of neuropathy  Feet no lesions, in grown nails, edema   : no polyuria, hematuria, dysuria    Objective:  Patient alert in NAD  /70  Pulse 82  Temp 97.9  F (36.6  C) (Oral)  Resp 16  Wt 182 lb (82.6 kg)  SpO2 94%  BMI 30.76 kg/m2       Wt Readings from Last 4 Encounters:   05/01/18 182 lb (82.6 kg)   10/20/17 181 lb (82.1 kg)   08/24/17 180 lb (81.6 kg)   07/17/17 181 lb 8 oz (82.3 kg)     TMs: clear  Sinuses with tenderness   Nasal mucosa with moderate edema  No exudate the pharynx  No cervical adenopathy   CV: normal S1, S2 without murmur, S3 or S4.  Carotid pulses: full  LUNGS: clear        Lab Results   Component Value Date    A1C 6.6 10/12/2017     A1C 6.3 02/28/2017    A1C 6.8 07/21/2016    A1C 6.4 09/23/2015    A1C 6.3 01/14/2015       Lab Results   Component Value Date    CHOL 201 10/12/2017    HDL 53 10/12/2017     10/12/2017    TRIG 193 10/12/2017    CHOLHDLRATIO 2.6 01/14/2015     PHQ-9 SCORE 9/23/2015 8/24/2017 5/6/2018   Total Score - - -   Total Score 16 7 13     JENNY-7 SCORE 9/23/2015 2/28/2017 5/6/2018   Total Score 11 7 8             ASSESSMENT/PLAN:             1. Type 2 diabetes mellitus without complication, without long-term current use of insulin (H)  Due for labs, check labs  - Basic metabolic panel  - Hemoglobin A1c    2. Major depressive disorder with single episode, in partial remission (H)  Mild increase but declines change in treatment    3. Acute non-recurrent maxillary sinusitis  Recommend treatment  - azithromycin (ZITHROMAX) 250 MG tablet; Two tablets first day, then one tablet daily for four days.  Dispense: 6 tablet; Refill: 0    4. Benign essential hypertension  Stable, continue med    5. Hyperlipidemia LDL goal <100  Controlled, continue med    6. Acquired hypothyroidism  stable      Adelita Kennedy MD  [facility]

## 2018-05-01 NOTE — NURSING NOTE
"Chief Complaint   Patient presents with     RECHECK     LOV 10/20/2017: Dm, TSH, HTN, LDL, Phq- blood sugars: 146, 121, 156- all fasting      Fatigue     pt self dx with laryngitis on March 29th- pt feeling tire, fatigue, lack of motivation, sleeping through night and still sleeping through the day- can fall asleep easily. SOB when exerting self. Constant blowing  out phelgm- light headiness. Can hear humming in ears. Have not been checking b;lood pressure. Have been absent minded as well.        Initial /70  Pulse 82  Temp 97.9  F (36.6  C) (Oral)  Resp 16  Wt 182 lb (82.6 kg)  SpO2 94%  BMI 30.76 kg/m2 Estimated body mass index is 30.76 kg/(m^2) as calculated from the following:    Height as of 10/20/17: 5' 4.5\" (1.638 m).    Weight as of this encounter: 182 lb (82.6 kg).  Medication Reconciliation: complete      Cielo Disla CMA      "

## 2018-05-02 LAB
ANION GAP SERPL CALCULATED.3IONS-SCNC: 7 MMOL/L (ref 3–14)
BUN SERPL-MCNC: 17 MG/DL (ref 7–30)
CALCIUM SERPL-MCNC: 10 MG/DL (ref 8.5–10.1)
CHLORIDE SERPL-SCNC: 108 MMOL/L (ref 94–109)
CO2 SERPL-SCNC: 25 MMOL/L (ref 20–32)
CREAT SERPL-MCNC: 0.63 MG/DL (ref 0.52–1.04)
GFR SERPL CREATININE-BSD FRML MDRD: >90 ML/MIN/1.7M2
GLUCOSE SERPL-MCNC: 116 MG/DL (ref 70–99)
POTASSIUM SERPL-SCNC: 4.1 MMOL/L (ref 3.4–5.3)
SODIUM SERPL-SCNC: 140 MMOL/L (ref 133–144)

## 2018-05-06 ASSESSMENT — ANXIETY QUESTIONNAIRES
GAD7 TOTAL SCORE: 8
6. BECOMING EASILY ANNOYED OR IRRITABLE: SEVERAL DAYS
5. BEING SO RESTLESS THAT IT IS HARD TO SIT STILL: NOT AT ALL
1. FEELING NERVOUS, ANXIOUS, OR ON EDGE: SEVERAL DAYS
3. WORRYING TOO MUCH ABOUT DIFFERENT THINGS: MORE THAN HALF THE DAYS
2. NOT BEING ABLE TO STOP OR CONTROL WORRYING: MORE THAN HALF THE DAYS
7. FEELING AFRAID AS IF SOMETHING AWFUL MIGHT HAPPEN: SEVERAL DAYS

## 2018-05-06 ASSESSMENT — PATIENT HEALTH QUESTIONNAIRE - PHQ9: 5. POOR APPETITE OR OVEREATING: SEVERAL DAYS

## 2018-05-07 ASSESSMENT — ANXIETY QUESTIONNAIRES: GAD7 TOTAL SCORE: 8

## 2018-05-07 ASSESSMENT — PATIENT HEALTH QUESTIONNAIRE - PHQ9: SUM OF ALL RESPONSES TO PHQ QUESTIONS 1-9: 13

## 2018-06-25 ENCOUNTER — TRANSFERRED RECORDS (OUTPATIENT)
Dept: HEALTH INFORMATION MANAGEMENT | Facility: CLINIC | Age: 83
End: 2018-06-25

## 2018-07-19 ENCOUNTER — TRANSFERRED RECORDS (OUTPATIENT)
Dept: HEALTH INFORMATION MANAGEMENT | Facility: CLINIC | Age: 83
End: 2018-07-19

## 2018-07-24 DIAGNOSIS — G47.00 INSOMNIA, UNSPECIFIED TYPE: ICD-10-CM

## 2018-07-25 NOTE — TELEPHONE ENCOUNTER
"Requested Prescriptions   Pending Prescriptions Disp Refills     traZODone (DESYREL) 150 MG tablet [Pharmacy Med Name: TRAZODONE 150 MG TABLET] 90 tablet 3     Sig: TAKE 1 TABLET (150 MG) BY MOUTH AT BEDTIME    Serotonin Modulators Passed    7/25/2018  1:00 PM       Passed - Recent (12 mo) or future (30 days) visit within the authorizing provider's specialty    Patient had office visit in the last 12 months or has a visit in the next 30 days with authorizing provider or within the authorizing provider's specialty.  See \"Patient Info\" tab in inbasket, or \"Choose Columns\" in Meds & Orders section of the refill encounter.           Passed - Patient is age 18 or older       Passed - No active pregnancy on record       Passed - No positive pregnancy test in past 12 months        "

## 2018-07-27 RX ORDER — TRAZODONE HYDROCHLORIDE 150 MG/1
TABLET ORAL
Qty: 90 TABLET | Refills: 1 | Status: SHIPPED | OUTPATIENT
Start: 2018-07-27 | End: 2019-01-03

## 2018-07-27 NOTE — TELEPHONE ENCOUNTER
Prescription approved per [facility] Refill Protocol. TENZIN Pinzon R.N.

## 2018-08-06 ENCOUNTER — OFFICE VISIT (OUTPATIENT)
Dept: INTERNAL MEDICINE | Facility: CLINIC | Age: 83
End: 2018-08-06
Payer: COMMERCIAL

## 2018-08-06 ENCOUNTER — RADIANT APPOINTMENT (OUTPATIENT)
Dept: GENERAL RADIOLOGY | Facility: CLINIC | Age: 83
End: 2018-08-06
Attending: INTERNAL MEDICINE
Payer: COMMERCIAL

## 2018-08-06 VITALS
DIASTOLIC BLOOD PRESSURE: 64 MMHG | HEIGHT: 65 IN | BODY MASS INDEX: 29.59 KG/M2 | TEMPERATURE: 97.6 F | HEART RATE: 90 BPM | WEIGHT: 177.6 LBS | RESPIRATION RATE: 16 BRPM | OXYGEN SATURATION: 96 % | SYSTOLIC BLOOD PRESSURE: 110 MMHG

## 2018-08-06 DIAGNOSIS — R05.9 COUGH: ICD-10-CM

## 2018-08-06 DIAGNOSIS — R07.0 THROAT PAIN: ICD-10-CM

## 2018-08-06 DIAGNOSIS — R06.02 SOB (SHORTNESS OF BREATH): ICD-10-CM

## 2018-08-06 DIAGNOSIS — J02.0 ACUTE STREPTOCOCCAL PHARYNGITIS: Primary | ICD-10-CM

## 2018-08-06 LAB
DEPRECATED S PYO AG THROAT QL EIA: ABNORMAL
SPECIMEN SOURCE: ABNORMAL

## 2018-08-06 PROCEDURE — 87880 STREP A ASSAY W/OPTIC: CPT | Performed by: INTERNAL MEDICINE

## 2018-08-06 PROCEDURE — 71046 X-RAY EXAM CHEST 2 VIEWS: CPT

## 2018-08-06 PROCEDURE — 99214 OFFICE O/P EST MOD 30 MIN: CPT | Performed by: INTERNAL MEDICINE

## 2018-08-06 RX ORDER — PENICILLIN V POTASSIUM 500 MG/1
500 TABLET, FILM COATED ORAL 3 TIMES DAILY
Qty: 30 TABLET | Refills: 0 | Status: SHIPPED | OUTPATIENT
Start: 2018-08-06 | End: 2018-10-09

## 2018-08-06 NOTE — NURSING NOTE
"/64 (BP Location: Left arm, Patient Position: Chair, Cuff Size: Adult Large)  Pulse 90  Temp 97.6  F (36.4  C) (Oral)  Resp 16  Ht 5' 4.5\" (1.638 m)  Wt 177 lb 9.6 oz (80.6 kg)  SpO2 96%  Breastfeeding? No  BMI 30.01 kg/m2  Anat Arreola CMA    "

## 2018-08-06 NOTE — PROGRESS NOTES
Dr Clemente's note      Patient's instructions / PLAN:                                                        Plan:  1. Penicillin 500 mg 3 times a day         ASSESSMENT & PLAN:                                                      (J02.0) Acute streptococcal pharyngitis  (primary encounter diagnosis)  Comment:   Plan: penicillin V potassium (VEETID) 500 MG tablet            (R06.02) SOB (shortness of breath)  Comment:   Plan:     (R07.0) Throat pain  Comment:   Plan: Strep, Rapid Screen            (R05) Cough  Comment:   Plan: XR Chest 2 Views               Chief complaint:                                                      Sore throat, malaise    SUBJECTIVE:   Norma F Alpers is a 83 year old female who presents to clinic today for the following health issues:      *Pharyngitis-  -- Started .   -- Sore throat, ears are plugged (can't hear out of her left ear), extreme dizziness, muscle aches (but she says she has those frequently), headache, sinus pressure,   -- coughs constantly (yellow phlegm).   --Feels ill, malaise, fatigue  --Feels short of breath with activity, no swollen legs, no palpitations, no chest pain    I was concerned for pneumonia.  I reviewed the chest x-ray myself, no signs of pneumonia.  Meanwhile the test for strep throat came back positive.    Her daughter is on chemotherapy for breast cancer and she is scheduled to come tomorrow. Mrs Alpers best friends  start tomorrow.  I advised her to stay home and avoid contact with friends and especially her daughter for at least couple of days.      Review of Systems:                                                      ROS: negative for fever, chills, wheezes, chest pain,  vomiting, abdominal pain, leg swelling positive for cough and mild shortness of breath with activity, as above         OBJECTIVE:             Physical exam:  Blood pressure 110/64, pulse 90, temperature 97.6  F (36.4  C), temperature source Oral, resp. rate 16, height  "5' 4.5\" (1.638 m), weight 177 lb 9.6 oz (80.6 kg), SpO2 96 %, not currently breastfeeding.   NAD, appears comfortable  Skin: no rashes   HEENT: PERRLA, EOMI, pink conjunctiva, anicteric sclerae, bilateral tympanic membranes are clinically normal, oropharynx is mild erythematosus  Neck: supple, no JVD,  No thyroidmegaly. Lymph nodes nonpalpable cervical and supraclavicular.  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    no residual - was aphasic for awhile after CVA      PSHx: reviewed  Past Surgical History:   Procedure Laterality Date     APPENDECTOMY      Ruptured --peritonitis     C NONSPECIFIC PROCEDURE      Left foot surgery     COCCYGECTOMY  1/2011    Zuleyka Vernell     HERNIA REPAIR      Abdomen     HYSTERECTOMY, PAP NO LONGER INDICATED  1997     RELEASE TRIGGER FINGER  4/2/2014    Procedure: RELEASE TRIGGER FINGER;  Release Trigger Thumb ;  Surgeon: Gurpreet Mast MD;  Location:  OR        Meds: reviewed  Current Outpatient Prescriptions   Medication Sig Dispense Refill     aspirin 325 MG tablet Take  by mouth daily.       blood glucose monitoring (ACCU-CHEK ANTONY SMARTVIEW) meter device kit Use to test blood sugar 1-2 times daily or as directed.  Ok to substitute alternative if insurance prefers. 1 kit 0     blood glucose monitoring (ACCU-CHEK SMARTVIEW) test strip Use to test blood sugar 2-3 times daily or as directed. 100 strip prn     Calcium " Carbonate (CALCIUM 600 PO) Take 1 tablet by mouth daily 2 times daily       cetirizine (ZYRTEC) 10 MG tablet Take 10 mg by mouth daily as needed for allergies       EPINEPHrine (EPIPEN) 0.3 MG/0.3ML injection Inject 0.3 mLs (0.3 mg) into the muscle once as needed for anaphylaxis 2 each 5     glimepiride (AMARYL) 1 MG tablet Take 1 tablet (1 mg) by mouth every morning (before breakfast) 90 tablet 3     glucosamine-chondroitinoitin 750-600 MG TABS Take 1 tablet by mouth 2 times daily.         lancets thin MISC 1 Device 2 times daily. 200 each prn     levothyroxine (SYNTHROID/LEVOTHROID) 75 MCG tablet Take 1 tablet (75 mcg) by mouth every morning 90 tablet 3     lisinopril-hydrochlorothiazide (PRINZIDE/ZESTORETIC) 10-12.5 MG per tablet Take 1 tablet by mouth daily 90 tablet 3     Multiple Vitamins-Minerals (MULTIVITAMIN ADULT) TABS Take 1 tablet by mouth daily       Multiple Vitamins-Minerals (PRESERVISION AREDS 2 PO) Take 1 tablet by mouth daily       omega-3 fatty acids (FISH OIL) 1200 MG capsule Take 1 capsule by mouth daily.       potassium chloride SA (POTASSIUM CHLORIDE) 20 MEQ CR tablet TAKE 1 TABLET (20 MEQ) BY MOUTH 2 TIMES DAILY 180 tablet 3     pravastatin (PRAVACHOL) 80 MG tablet Take 1 tablet (80 mg) by mouth daily 90 tablet 3     psyllium (METAMUCIL) 58.6 % POWD Take by mouth daily       raloxifene (EVISTA) 60 MG tablet Take 1 tablet (60 mg) by mouth daily 90 tablet 3     ranitidine (ZANTAC) 150 MG tablet Take 1 tablet (150 mg) by mouth 2 times daily as needed for heartburn 60 tablet 5     sertraline (ZOLOFT) 100 MG tablet Take 1 tablet (100 mg) by mouth daily 90 tablet 3     traZODone (DESYREL) 150 MG tablet TAKE 1 TABLET (150 MG) BY MOUTH AT BEDTIME 90 tablet 1     TURMERIC PO Take 2 tablets by mouth daily        valACYclovir (VALTREX) 500 MG tablet TAKE 2 TABLET TWICE A DAY BY MOUTH X 2 DAYS FOR FLARES.  0     vitamin B complex with vitamin C (VITAMIN  B COMPLEX) TABS tablet Take 1 tablet by mouth  daily         Soc Hx: reviewed  Fam Hx: reviewed          Lavern Clemente MD  Internal Medicine

## 2018-08-06 NOTE — MR AVS SNAPSHOT
"              After Visit Summary   8/6/2018    Norma F Alpers    MRN: [id number]           Patient Information     Date Of Birth          10/29/1934        Visit Information        Provider Department      8/6/2018 1:00 PM Lavern Soni MD [facility]        Today's Diagnoses     Acute streptococcal pharyngitis    -  1    SOB (shortness of breath)        Throat pain        Cough          Care Instructions    Plan:  1. Penicillin 500 mg 3 times a day           Follow-ups after your visit        Who to contact     If you have questions or need follow up information about today's clinic visit or your schedule please contact [facility] directly at [phone].  Normal or non-critical lab and imaging results will be communicated to you by MyChart, letter or phone within 4 business days after the clinic has received the results. If you do not hear from us within 7 days, please contact the clinic through MyChart or phone. If you have a critical or abnormal lab result, we will notify you by phone as soon as possible.  Submit refill requests through [employer] or call your pharmacy and they will forward the refill request to us. Please allow 3 business days for your refill to be completed.          Additional Information About Your Visit        Care EveryWhere ID     This is your Care EveryWhere ID. This could be used by other organizations to access your [place] medical records  [id number]        Your Vitals Were     Pulse Temperature Respirations Height Pulse Oximetry Breastfeeding?    90 97.6  F (36.4  C) (Oral) 16 5' 4.5\" (1.638 m) 96% No    BMI (Body Mass Index)                   30.01 kg/m2            Blood Pressure from Last 3 Encounters:   08/06/18 110/64   05/01/18 108/70   10/20/17 130/60    Weight from Last 3 Encounters:   08/06/18 177 lb 9.6 oz (80.6 kg)   05/01/18 182 lb (82.6 kg)   10/20/17 181 lb (82.1 kg)              We Performed the Following     Strep, " Rapid Screen          Today's Medication Changes          These changes are accurate as of 8/6/18 11:59 PM.  If you have any questions, ask your nurse or doctor.               Start taking these medicines.        Dose/Directions    penicillin V potassium 500 MG tablet   Commonly known as:  VEETID   Used for:  Acute streptococcal pharyngitis   Started by:  Lavern Soni MD        Dose:  500 mg   Take 1 tablet (500 mg) by mouth 3 times daily   Quantity:  30 tablet   Refills:  0            Where to get your medicines      These medications were sent to [facility]pharmacy #[id number] - [place], [place] - [address]  [id number], [place] [address]     Phone:  [phone]     penicillin V potassium 500 MG tablet                Primary Care Provider Office Phone # Fax #    Adelita Kennedy [phone]       [address]  [facility] [address]        Equal Access to Services     [facility]: Hadii jodie eisenberg hadasho Sosky, waaxda luqadaha, qaybta kaalmada adeegyada, waxay mickyin haykarenn tiffanie waldron . So [facility] [phone].    ATENCIÓN: Si habla español, tiene a bro disposición servicios gratuitos de asistencia lingüística. Kyree al [phone].    We comply with applicable federal civil rights laws and [place] laws. We do not discriminate on the basis of race, color, national origin, age, disability, sex, sexual orientation, or gender identity.            Thank you!     Thank you for choosing [facility]  for your care. Our goal is always to provide you with excellent care. Hearing back from our patients is one way we can continue to improve our services. Please take a few minutes to complete the written survey that you may receive in the mail after your visit with us. Thank you!             Your Updated Medication List - Protect others around you: Learn how to safely use, store and throw away your medicines at www.disposemymeds.org.          This  list is accurate as of 8/6/18 11:59 PM.  Always use your most recent med list.                   Brand Name Dispense Instructions for use Diagnosis    aspirin 325 MG tablet      Take  by mouth daily.        blood glucose monitoring meter device kit     1 kit    Use to test blood sugar 1-2 times daily or as directed.  Ok to substitute alternative if insurance prefers.    Type 2 diabetes mellitus without complication, without long-term current use of insulin (H)       blood glucose monitoring test strip    ACCU-CHEK SMARTVIEW    100 strip    Use to test blood sugar 2-3 times daily or as directed.    Type 2 diabetes mellitus with other circulatory complications       CALCIUM 600 PO      Take 1 tablet by mouth daily 2 times daily        cetirizine 10 MG tablet    zyrTEC     Take 10 mg by mouth daily as needed for allergies        EPINEPHrine 0.3 MG/0.3ML injection 2-pack    EPIPEN/ADRENACLICK/or ANY BX GENERIC EQUIV    2 each    Inject 0.3 mLs (0.3 mg) into the muscle once as needed for anaphylaxis    Bee allergy status       glimepiride 1 MG tablet    AMARYL    90 tablet    Take 1 tablet (1 mg) by mouth every morning (before breakfast)    Type 2 diabetes mellitus without complication, without long-term current use of insulin (H)       glucosamine-chondroitinoitin 750-600 MG Tabs      Take 1 tablet by mouth 2 times daily.        levothyroxine 75 MCG tablet    SYNTHROID/LEVOTHROID    90 tablet    Take 1 tablet (75 mcg) by mouth every morning    Acquired hypothyroidism       lisinopril-hydrochlorothiazide 10-12.5 MG per tablet    PRINZIDE/ZESTORETIC    90 tablet    Take 1 tablet by mouth daily    Benign essential hypertension       omega-3 fatty acids 1200 MG capsule      Take 1 capsule by mouth daily.        penicillin V potassium 500 MG tablet    VEETID    30 tablet    Take 1 tablet (500 mg) by mouth 3 times daily    Acute streptococcal pharyngitis       potassium chloride SA 20 MEQ CR tablet    KLOR-CON    180 tablet     TAKE 1 TABLET (20 MEQ) BY MOUTH 2 TIMES DAILY    Essential hypertension       pravastatin 80 MG tablet    PRAVACHOL    90 tablet    Take 1 tablet (80 mg) by mouth daily    Hyperlipidemia LDL goal <100       * PRESERVISION AREDS 2 PO      Take 1 tablet by mouth daily        * MULTIVITAMIN ADULT Tabs      Take 1 tablet by mouth daily        psyllium 58.6 % Powd    METAMUCIL     Take by mouth daily        raloxifene 60 MG tablet    Evista    90 tablet    Take 1 tablet (60 mg) by mouth daily    Osteopenia, unspecified location       ranitidine 150 MG tablet    ZANTAC    60 tablet    Take 1 tablet (150 mg) by mouth 2 times daily as needed for heartburn    Gastroesophageal reflux disease without esophagitis       sertraline 100 MG tablet    ZOLOFT    90 tablet    Take 1 tablet (100 mg) by mouth daily    Anxiety       thin lancets    NO BRAND SPECIFIED    200 each    1 Device 2 times daily.    Type 2 diabetes, HbA1C goal < 8% (H)       traZODone 150 MG tablet    DESYREL    90 tablet    TAKE 1 TABLET (150 MG) BY MOUTH AT BEDTIME    Insomnia, unspecified type       TURMERIC PO      Take 2 tablets by mouth daily        valACYclovir 500 MG tablet    VALTREX     TAKE 2 TABLET TWICE A DAY BY MOUTH X 2 DAYS FOR FLARES.        vitamin B complex with vitamin C Tabs tablet      Take 1 tablet by mouth daily        * Notice:  This list has 2 medication(s) that are the same as other medications prescribed for you. Read the directions carefully, and ask your doctor or other care provider to review them with you.

## 2018-08-12 DIAGNOSIS — E78.5 HYPERLIPIDEMIA LDL GOAL <100: ICD-10-CM

## 2018-08-12 DIAGNOSIS — I10 ESSENTIAL HYPERTENSION: ICD-10-CM

## 2018-08-13 NOTE — TELEPHONE ENCOUNTER
"Requested Prescriptions   Pending Prescriptions Disp Refills     pravastatin (PRAVACHOL) 80 MG tablet [Pharmacy Med Name: PRAVASTATIN SODIUM 80 MG TAB]  Last Written Prescription Date:  8/24/2017  Last Fill Quantity: 90,  # refills: 3   Last office visit: 8/6/2018 with prescribing provider:     Future Office Visit:   90 tablet 3     Sig: TAKE 1 TABLET (80 MG) BY MOUTH DAILY    Statins Protocol Passed    8/12/2018 10:20 AM       Passed - LDL on file in past 12 months    Recent Labs   Lab Test  10/12/17   1008   LDL  109*            Passed - No abnormal creatine kinase in past 12 months    No lab results found.            Passed - Recent (12 mo) or future (30 days) visit within the authorizing provider's specialty    Patient had office visit in the last 12 months or has a visit in the next 30 days with authorizing provider or within the authorizing provider's specialty.  See \"Patient Info\" tab in inbasket, or \"Choose Columns\" in Meds & Orders section of the refill encounter.           Passed - Patient is age 18 or older       Passed - No active pregnancy on record       Passed - No positive pregnancy test in past 12 months        KLOR-CON 20 MEQ CR tablet [Pharmacy Med Name: KLOR-CON M20 TABLET]  Last Written Prescription Date:  historical  Last Fill Quantity: ,  # refills:   Last office visit: 8/6/2018 with prescribing provider:     Future Office Visit:   180 tablet 3     Sig: TAKE 1 TABLET (20 MEQ) BY MOUTH 2 TIMES DAILY    Potassium Supplements Protocol Passed    8/12/2018 10:20 AM       Passed - Recent (12 mo) or future (30 days) visit within the authorizing provider's specialty    Patient had office visit in the last 12 months or has a visit in the next 30 days with authorizing provider or within the authorizing provider's specialty.  See \"Patient Info\" tab in inbasket, or \"Choose Columns\" in Meds & Orders section of the refill encounter.           Passed - Patient is age 18 or older       Passed - Normal serum " potassium in past 12 months    Recent Labs   Lab Test  05/01/18   1532   POTASSIUM  4.1

## 2018-08-14 RX ORDER — PRAVASTATIN SODIUM 80 MG/1
TABLET ORAL
Qty: 90 TABLET | Refills: 0 | Status: SHIPPED | OUTPATIENT
Start: 2018-08-14 | End: 2018-11-12

## 2018-08-14 RX ORDER — POTASSIUM CHLORIDE 1500 MG/1
TABLET, EXTENDED RELEASE ORAL
Qty: 180 TABLET | Refills: 2 | Status: SHIPPED | OUTPATIENT
Start: 2018-08-14 | End: 2019-03-04

## 2018-09-06 ENCOUNTER — RADIANT APPOINTMENT (OUTPATIENT)
Dept: GENERAL RADIOLOGY | Facility: CLINIC | Age: 83
End: 2018-09-06
Attending: PHYSICIAN ASSISTANT
Payer: COMMERCIAL

## 2018-09-06 ENCOUNTER — OFFICE VISIT (OUTPATIENT)
Dept: INTERNAL MEDICINE | Facility: CLINIC | Age: 83
End: 2018-09-06
Payer: COMMERCIAL

## 2018-09-06 VITALS
RESPIRATION RATE: 24 BRPM | BODY MASS INDEX: 29.82 KG/M2 | TEMPERATURE: 98.5 F | OXYGEN SATURATION: 96 % | HEIGHT: 65 IN | SYSTOLIC BLOOD PRESSURE: 126 MMHG | WEIGHT: 179 LBS | DIASTOLIC BLOOD PRESSURE: 70 MMHG | HEART RATE: 98 BPM

## 2018-09-06 DIAGNOSIS — J01.90 ACUTE SINUSITIS WITH SYMPTOMS > 10 DAYS: Primary | ICD-10-CM

## 2018-09-06 DIAGNOSIS — R05.9 COUGH: ICD-10-CM

## 2018-09-06 DIAGNOSIS — R06.2 WHEEZING: ICD-10-CM

## 2018-09-06 PROCEDURE — 99214 OFFICE O/P EST MOD 30 MIN: CPT | Performed by: PHYSICIAN ASSISTANT

## 2018-09-06 PROCEDURE — 71046 X-RAY EXAM CHEST 2 VIEWS: CPT

## 2018-09-06 RX ORDER — INFLUENZA A VIRUS A/VICTORIA/4897/2022 IVR-238 (H1N1) ANTIGEN (FORMALDEHYDE INACTIVATED), INFLUENZA A VIRUS A/CALIFORNIA/122/2022 SAN-022 (H3N2) ANTIGEN (FORMALDEHYDE INACTIVATED), AND INFLUENZA B VIRUS B/MICHIGAN/01/2021 ANTIGEN (FORMALDEHYDE INACTIVATED) 60; 60; 60 UG/.5ML; UG/.5ML; UG/.5ML
INJECTION, SUSPENSION INTRAMUSCULAR
Refills: 0 | COMMUNITY
Start: 2018-08-27 | End: 2019-09-30

## 2018-09-06 RX ORDER — AZITHROMYCIN 250 MG/1
TABLET, FILM COATED ORAL
Qty: 6 TABLET | Refills: 0 | Status: SHIPPED | OUTPATIENT
Start: 2018-09-06 | End: 2018-10-09

## 2018-09-06 RX ORDER — BENZONATATE 100 MG/1
100 CAPSULE ORAL 3 TIMES DAILY PRN
Qty: 42 CAPSULE | Refills: 0 | Status: SHIPPED | OUTPATIENT
Start: 2018-09-06 | End: 2018-10-09

## 2018-09-06 RX ORDER — ALBUTEROL SULFATE 90 UG/1
2 AEROSOL, METERED RESPIRATORY (INHALATION) EVERY 6 HOURS PRN
Qty: 1 INHALER | Refills: 0 | Status: SHIPPED | OUTPATIENT
Start: 2018-09-06 | End: 2018-10-09

## 2018-09-06 NOTE — MR AVS SNAPSHOT
After Visit Summary   9/6/2018    Norma F Alpers    MRN: [id number]           Patient Information     Date Of Birth          10/29/1934        Visit Information        Provider Department      9/6/2018 1:00 PM Naomi Molina PA-C [facility]        Today's Diagnoses     Acute sinusitis with symptoms > 10 days    -  1    Cough        Wheezing          Care Instructions      Viral or Bacterial Bronchitis with Wheezing (Adult)    Bronchitis is an infection of the air passages. It often occurs during a cold and is usually caused by a virus. Symptoms include cough with mucus (phlegm) and low-grade fever. This illness is contagious during the first few days and is spread through the air by coughing and sneezing, or by direct contact (touching the sick person and then touching your own eyes, nose, or mouth).  If there is a lot of inflammation, air flow is restricted. The air passages may also go into spasm, especially if you have asthma. This causes wheezing and difficulty breathing even in people who do not have asthma.  Bronchitis usually lasts 7 to 14 days. The wheezing should improve with treatment during the first week. An inhaler is often prescribed to relax the air passages and stop wheezing. Antibiotics will be prescribed if your doctor thinks there is also a secondary bacterial infection.  Home care    If symptoms are severe, rest at home for the first 2 to 3 days. When you go back to your usual activities, don't let yourself get too tired.    Do not smoke. Also avoid being exposed to secondhand smoke.    You may use over-the-counter medicine to control fever or pain, unless another medicine was prescribed. Note: If you have chronic liver or kidney disease or have ever had a stomach ulcer or gastrointestinal bleeding, talk with your healthcare provider before using these medicines. Also talk to your provider if you are taking medicine to prevent blood clots.) Aspirin should never  be given to anyone younger than 18 years of age who is ill with a viral infection or fever. It may cause severe liver or brain damage.    Your appetite may be poor, so a light diet is fine. Avoid dehydration by drinking 6 to 8 glasses of fluids per day (such as water, soft drinks, sports drinks, juices, tea, or soup). Extra fluids will help loosen secretions in the nose and lungs.    Over-the-counter cough, cold, and sore-throat medicines will not shorten the length of the illness, but they may be helpful to reduce symptoms. (Note: Do not use decongestants if you have high blood pressure.)    If you were given an inhaler, use it exactly as directed. If you need to use it more often than prescribed, your condition may be worsening. If this happens, contact your healthcare provider.    If prescribed, finish all antibiotic medicine, even if you are feeling better after only a few days.  Follow-up care  Follow up with your healthcare provider, or as advised. If you had an X-ray or ECG (electrocardiogram), a specialist will review it. You will be notified of any new findings that may affect your care.  If you are age 65 or older, or if you have a chronic lung disease or condition that affects your immune system, or you smoke, ask your healthcare provider about getting a pneumococcal vaccine and a yearly flu shot (influenza vaccine).  When to seek medical advice  Call your healthcare provider right away if any of these occur:    Fever of 100.4 F (38 C) or higher, or as directed by your healthcare provider    Coughing up increasing amounts of colored sputum    Weakness, drowsiness, headache, facial pain, ear pain, or a stiff neck  Call 911  Call 911 if any of these occur.    Coughing up blood    Worsening weakness, drowsiness, headache, or stiff neck    Increased wheezing not helped with medication, shortness of breath, or pain with breathing  Date Last Reviewed: 9/13/2015 2000-2017 [employer] [address]  "[place] [address]. All rights reserved. This information is not intended as a substitute for professional medical care. Always follow your healthcare professional's instructions.                Follow-ups after your visit        Follow-up notes from your care team     Return if symptoms worsen or fail to improve.      Future tests that were ordered for you today     Open Future Orders        Priority Expected Expires Ordered    XR Chest 2 Views Routine 9/6/2018 9/6/2019 9/6/2018            Who to contact     If you have questions or need follow up information about today's clinic visit or your schedule please contact [facility] directly at [phone].  Normal or non-critical lab and imaging results will be communicated to you by MyChart, letter or phone within 4 business days after the clinic has received the results. If you do not hear from us within 7 days, please contact the clinic through MyChart or phone. If you have a critical or abnormal lab result, we will notify you by phone as soon as possible.  Submit refill requests through [facility] or call your pharmacy and they will forward the refill request to us. Please allow 3 business days for your refill to be completed.          Additional Information About Your Visit        Care EveryWhere ID     This is your Care EveryWhere ID. This could be used by other organizations to access your [facility] medical records  [id number]        Your Vitals Were     Pulse Temperature Respirations Height Pulse Oximetry BMI (Body Mass Index)    98 98.5  F (36.9  C) (Oral) 24 5' 4.5\" (1.638 m) 96% 30.25 kg/m2       Blood Pressure from Last 3 Encounters:   09/06/18 126/70   08/06/18 110/64   05/01/18 108/70    Weight from Last 3 Encounters:   09/06/18 179 lb (81.2 kg)   08/06/18 177 lb 9.6 oz (80.6 kg)   05/01/18 182 lb (82.6 kg)                 Today's Medication Changes          These changes are accurate as of 9/6/18  1:23 PM.  If you have " any questions, ask your nurse or doctor.               Start taking these medicines.        Dose/Directions    albuterol 108 (90 Base) MCG/ACT inhaler   Commonly known as:  PROAIR HFA/PROVENTIL HFA/VENTOLIN HFA   Used for:  Wheezing   Started by:  Naomi Molina PA-C        Dose:  2 puff   Inhale 2 puffs into the lungs every 6 hours as needed for shortness of breath / dyspnea or wheezing   Quantity:  1 Inhaler   Refills:  0       azithromycin 250 MG tablet   Commonly known as:  ZITHROMAX   Used for:  Cough, Acute sinusitis with symptoms > 10 days   Started by:  Naomi Molina PA-C        Two tablets first day, then one tablet daily for four days.   Quantity:  6 tablet   Refills:  0       benzonatate 100 MG capsule   Commonly known as:  TESSALON   Used for:  Cough   Started by:  Naomi Molina PA-C        Dose:  100 mg   Take 1 capsule (100 mg) by mouth 3 times daily as needed for cough   Quantity:  42 capsule   Refills:  0            Where to get your medicines      These medications were sent to [facility]/pharmacy #[id number] [place] [id number], [place] [address]     Phone:  [phone]     albuterol 108 (90 Base) MCG/ACT inhaler    azithromycin 250 MG tablet    benzonatate 100 MG capsule                Primary Care Provider Office Phone # Fax #    Adelita Kennedy [phone]       [address]  [facility] [address]        Equal Access to Services     [facility]: Hadii jodie nuneso Sosky, waaxda luqadaha, qaybta kaalmada tiffanieyada, neto pascual. So [facility] [phone].    ATENCIÓN: Si habla español, tiene a bro disposición servicios gratuitos de asistencia lingüística. Llame al [phone].    We comply with applicable federal civil rights laws and [place] laws. We do not discriminate on the basis of race, color, national origin, age, disability, sex, sexual orientation, or gender identity.             Thank you!     Thank you for choosing [facility]  for your care. Our goal is always to provide you with excellent care. Hearing back from our patients is one way we can continue to improve our services. Please take a few minutes to complete the written survey that you may receive in the mail after your visit with us. Thank you!             Your Updated Medication List - Protect others around you: Learn how to safely use, store and throw away your medicines at www.disposemymeds.org.          This list is accurate as of 9/6/18  1:23 PM.  Always use your most recent med list.                   Brand Name Dispense Instructions for use Diagnosis    albuterol 108 (90 Base) MCG/ACT inhaler    PROAIR HFA/PROVENTIL HFA/VENTOLIN HFA    1 Inhaler    Inhale 2 puffs into the lungs every 6 hours as needed for shortness of breath / dyspnea or wheezing    Wheezing       aspirin 325 MG tablet      Take  by mouth daily.        azithromycin 250 MG tablet    ZITHROMAX    6 tablet    Two tablets first day, then one tablet daily for four days.    Cough, Acute sinusitis with symptoms > 10 days       benzonatate 100 MG capsule    TESSALON    42 capsule    Take 1 capsule (100 mg) by mouth 3 times daily as needed for cough    Cough       blood glucose monitoring meter device kit     1 kit    Use to test blood sugar 1-2 times daily or as directed.  Ok to substitute alternative if insurance prefers.    Type 2 diabetes mellitus without complication, without long-term current use of insulin (H)       blood glucose monitoring test strip    ACCU-CHEK SMARTVIEW    100 strip    Use to test blood sugar 2-3 times daily or as directed.    Type 2 diabetes mellitus with other circulatory complications       CALCIUM 600 PO      Take 1 tablet by mouth daily 2 times daily        cetirizine 10 MG tablet    zyrTEC     Take 10 mg by mouth daily as needed for allergies        EPINEPHrine 0.3 MG/0.3ML injection 2-pack    EPIPEN/ADRENACLICK/or  ANY BX GENERIC EQUIV    2 each    Inject 0.3 mLs (0.3 mg) into the muscle once as needed for anaphylaxis    Bee allergy status       FLUZONE HIGH-DOSE 0.5 ML injection   Generic drug:  influenza Vac Split High-Dose      ADM 0.5ML IM UTD        glimepiride 1 MG tablet    AMARYL    90 tablet    Take 1 tablet (1 mg) by mouth every morning (before breakfast)    Type 2 diabetes mellitus without complication, without long-term current use of insulin (H)       glucosamine-chondroitinoitin 750-600 MG Tabs      Take 1 tablet by mouth 2 times daily.        KLOR-CON 20 MEQ CR tablet   Generic drug:  potassium chloride SA     180 tablet    TAKE 1 TABLET (20 MEQ) BY MOUTH 2 TIMES DAILY    Essential hypertension       levothyroxine 75 MCG tablet    SYNTHROID/LEVOTHROID    90 tablet    Take 1 tablet (75 mcg) by mouth every morning    Acquired hypothyroidism       lisinopril-hydrochlorothiazide 10-12.5 MG per tablet    PRINZIDE/ZESTORETIC    90 tablet    Take 1 tablet by mouth daily    Benign essential hypertension       omega-3 fatty acids 1200 MG capsule      Take 1 capsule by mouth daily.        penicillin V potassium 500 MG tablet    VEETID    30 tablet    Take 1 tablet (500 mg) by mouth 3 times daily    Acute streptococcal pharyngitis       pravastatin 80 MG tablet    PRAVACHOL    90 tablet    TAKE 1 TABLET (80 MG) BY MOUTH DAILY    Hyperlipidemia LDL goal <100       * PRESERVISION AREDS 2 PO      Take 1 tablet by mouth daily        * MULTIVITAMIN ADULT Tabs      Take 1 tablet by mouth daily        psyllium 58.6 % Powd    METAMUCIL     Take by mouth daily        raloxifene 60 MG tablet    Evista    90 tablet    Take 1 tablet (60 mg) by mouth daily    Osteopenia, unspecified location       ranitidine 150 MG tablet    ZANTAC    60 tablet    Take 1 tablet (150 mg) by mouth 2 times daily as needed for heartburn    Gastroesophageal reflux disease without esophagitis       sertraline 100 MG tablet    ZOLOFT    90 tablet    Take 1  tablet (100 mg) by mouth daily    Anxiety       thin lancets    NO BRAND SPECIFIED    200 each    1 Device 2 times daily.    Type 2 diabetes, HbA1C goal < 8% (H)       traZODone 150 MG tablet    DESYREL    90 tablet    TAKE 1 TABLET (150 MG) BY MOUTH AT BEDTIME    Insomnia, unspecified type       TURMERIC PO      Take 2 tablets by mouth daily        valACYclovir 500 MG tablet    VALTREX     TAKE 2 TABLET TWICE A DAY BY MOUTH X 2 DAYS FOR FLARES.        vitamin B complex with vitamin C Tabs tablet      Take 1 tablet by mouth daily        * Notice:  This list has 2 medication(s) that are the same as other medications prescribed for you. Read the directions carefully, and ask your doctor or other care provider to review them with you.

## 2018-09-06 NOTE — PROGRESS NOTES
"  SUBJECTIVE:                                                    Norma F Alpers is a 83 year old female who presents to clinic today for the following health issues:        URI X 1 month treated in the past with PCN     Acute Illness   Acute illness concerns: cough and URI  Onset: 1 month    Fever: no     Chills/Sweats: YES    Headache (location?): YES    Sinus Pressure:YES    Conjunctivitis:  no    Ear Pain: YES: bilateral    Rhinorrhea: YES    Congestion: YES    Sore Throat: no      Cough: YES-productive of yellow sputum, barking    Wheeze: YES    Decreased Appetite: YES    Nausea: no     Vomiting: no     Diarrhea:  no     Dysuria/Freq.: no     Fatigue/Achiness: YES    Sick/Strep Exposure: YES- had strep a month ago and was treated with PCN for 10 days.     Therapies Tried and outcome: PCN     Patient was treated for strep a month ago and did have resolution of sore throat and tender lymph nodes but has had persistent sinus pressure and drainage and persisting and worsening cough. SHe reports having a cough productive initially of white sputum but in the last few days has developed yellow sputum. She has had chilled and has not taken her temp but does not think she has had fevers. She has had wheezing and some SOB. No chest pain. No other associated symptoms.    Problem list and histories reviewed & adjusted, as indicated.  Additional history: as documented    BP Readings from Last 3 Encounters:   09/06/18 126/70   08/06/18 110/64   05/01/18 108/70    Wt Readings from Last 3 Encounters:   09/06/18 179 lb (81.2 kg)   08/06/18 177 lb 9.6 oz (80.6 kg)   05/01/18 182 lb (82.6 kg)            ROS:  Constitutional, HEENT, cardiovascular, pulmonary, gi and gu systems are negative, except as otherwise noted.    OBJECTIVE:     /70  Pulse 98  Temp 98.5  F (36.9  C) (Oral)  Resp 24  Ht 5' 4.5\" (1.638 m)  Wt 179 lb (81.2 kg)  SpO2 96%  BMI 30.25 kg/m2  Body mass index is 30.25 kg/(m^2).  GENERAL: healthy, alert " and no distress  HENT: normal cephalic/atraumatic, both ears: clear effusion, rhinorrhea yellow, oropharynx clear, oral mucous membranes moist and sinuses: maxillary tenderness on bilateral  NECK: no adenopathy, no asymmetry, masses, or scars and thyroid normal to palpation  RESP: bronchial breath sounds more on the left than the right  CV: regular rates and rhythm, peripheral pulses strong and no peripheral edema  MS: no gross musculoskeletal defects noted, no edema  SKIN: no suspicious lesions or rashes  PSYCH: mentation appears normal, affect normal/bright    Diagnostic Test Results:  Xray-pending     ASSESSMENT/PLAN:       ICD-10-CM    1. Acute sinusitis with symptoms > 10 days J01.90 azithromycin (ZITHROMAX) 250 MG tablet   2. Cough R05 XR Chest 2 Views     azithromycin (ZITHROMAX) 250 MG tablet     benzonatate (TESSALON) 100 MG capsule   3. Wheezing R06.2 XR Chest 2 Views     albuterol (PROAIR HFA/PROVENTIL HFA/VENTOLIN HFA) 108 (90 Base) MCG/ACT inhaler     I will treat for bronchitis/sinusitis with a z pack and will have her follow up if cough and cold symptoms are persisting or worsening.   See Patient Instructions    Naomi Molina PA-C  [facility]

## 2018-09-06 NOTE — PATIENT INSTRUCTIONS
Viral or Bacterial Bronchitis with Wheezing (Adult)    Bronchitis is an infection of the air passages. It often occurs during a cold and is usually caused by a virus. Symptoms include cough with mucus (phlegm) and low-grade fever. This illness is contagious during the first few days and is spread through the air by coughing and sneezing, or by direct contact (touching the sick person and then touching your own eyes, nose, or mouth).  If there is a lot of inflammation, air flow is restricted. The air passages may also go into spasm, especially if you have asthma. This causes wheezing and difficulty breathing even in people who do not have asthma.  Bronchitis usually lasts 7 to 14 days. The wheezing should improve with treatment during the first week. An inhaler is often prescribed to relax the air passages and stop wheezing. Antibiotics will be prescribed if your doctor thinks there is also a secondary bacterial infection.  Home care    If symptoms are severe, rest at home for the first 2 to 3 days. When you go back to your usual activities, don't let yourself get too tired.    Do not smoke. Also avoid being exposed to secondhand smoke.    You may use over-the-counter medicine to control fever or pain, unless another medicine was prescribed. Note: If you have chronic liver or kidney disease or have ever had a stomach ulcer or gastrointestinal bleeding, talk with your healthcare provider before using these medicines. Also talk to your provider if you are taking medicine to prevent blood clots.) Aspirin should never be given to anyone younger than 18 years of age who is ill with a viral infection or fever. It may cause severe liver or brain damage.    Your appetite may be poor, so a light diet is fine. Avoid dehydration by drinking 6 to 8 glasses of fluids per day (such as water, soft drinks, sports drinks, juices, tea, or soup). Extra fluids will help loosen secretions in the nose and lungs.    Over-the-counter  cough, cold, and sore-throat medicines will not shorten the length of the illness, but they may be helpful to reduce symptoms. (Note: Do not use decongestants if you have high blood pressure.)    If you were given an inhaler, use it exactly as directed. If you need to use it more often than prescribed, your condition may be worsening. If this happens, contact your healthcare provider.    If prescribed, finish all antibiotic medicine, even if you are feeling better after only a few days.  Follow-up care  Follow up with your healthcare provider, or as advised. If you had an X-ray or ECG (electrocardiogram), a specialist will review it. You will be notified of any new findings that may affect your care.  If you are age 65 or older, or if you have a chronic lung disease or condition that affects your immune system, or you smoke, ask your healthcare provider about getting a pneumococcal vaccine and a yearly flu shot (influenza vaccine).  When to seek medical advice  Call your healthcare provider right away if any of these occur:    Fever of 100.4 F (38 C) or higher, or as directed by your healthcare provider    Coughing up increasing amounts of colored sputum    Weakness, drowsiness, headache, facial pain, ear pain, or a stiff neck  Call 911  Call 911 if any of these occur.    Coughing up blood    Worsening weakness, drowsiness, headache, or stiff neck    Increased wheezing not helped with medication, shortness of breath, or pain with breathing  Date Last Reviewed: 9/13/2015 2000-2017 [employer]. [address] [place] [address]. All rights reserved. This information is not intended as a substitute for professional medical care. Always follow your healthcare professional's instructions.

## 2018-09-11 ENCOUNTER — TRANSFERRED RECORDS (OUTPATIENT)
Dept: HEALTH INFORMATION MANAGEMENT | Facility: CLINIC | Age: 83
End: 2018-09-11

## 2018-09-21 ENCOUNTER — OFFICE VISIT (OUTPATIENT)
Dept: INTERNAL MEDICINE | Facility: CLINIC | Age: 83
End: 2018-09-21
Payer: COMMERCIAL

## 2018-09-21 VITALS
OXYGEN SATURATION: 97 % | BODY MASS INDEX: 29.91 KG/M2 | HEIGHT: 65 IN | RESPIRATION RATE: 24 BRPM | TEMPERATURE: 98.1 F | DIASTOLIC BLOOD PRESSURE: 60 MMHG | WEIGHT: 179.5 LBS | HEART RATE: 115 BPM | SYSTOLIC BLOOD PRESSURE: 120 MMHG

## 2018-09-21 DIAGNOSIS — E03.9 ACQUIRED HYPOTHYROIDISM: ICD-10-CM

## 2018-09-21 DIAGNOSIS — J01.90 SUBACUTE SINUSITIS, UNSPECIFIED LOCATION: Primary | ICD-10-CM

## 2018-09-21 DIAGNOSIS — H91.92 HEARING LOSS OF LEFT EAR, UNSPECIFIED HEARING LOSS TYPE: ICD-10-CM

## 2018-09-21 DIAGNOSIS — F41.9 ANXIETY: ICD-10-CM

## 2018-09-21 PROCEDURE — 99214 OFFICE O/P EST MOD 30 MIN: CPT | Performed by: FAMILY MEDICINE

## 2018-09-21 RX ORDER — LEVOTHYROXINE SODIUM 75 UG/1
75 TABLET ORAL EVERY MORNING
Qty: 90 TABLET | Refills: 3 | Status: CANCELLED | OUTPATIENT
Start: 2018-09-21

## 2018-09-21 RX ORDER — PREDNISONE 10 MG/1
TABLET ORAL
Qty: 15 TABLET | Refills: 0 | Status: SHIPPED | OUTPATIENT
Start: 2018-09-21 | End: 2018-10-09

## 2018-09-21 RX ORDER — CEFUROXIME AXETIL 500 MG/1
500 TABLET ORAL 2 TIMES DAILY
Qty: 20 TABLET | Refills: 0 | Status: SHIPPED | OUTPATIENT
Start: 2018-09-21 | End: 2018-10-09

## 2018-09-21 RX ORDER — LEVOTHYROXINE SODIUM 75 UG/1
75 TABLET ORAL EVERY MORNING
Qty: 90 TABLET | Refills: 1 | Status: SHIPPED | OUTPATIENT
Start: 2018-09-21 | End: 2019-02-20

## 2018-09-21 RX ORDER — SERTRALINE HYDROCHLORIDE 100 MG/1
100 TABLET, FILM COATED ORAL DAILY
Qty: 90 TABLET | Refills: 3 | Status: CANCELLED | OUTPATIENT
Start: 2018-09-21

## 2018-09-21 RX ORDER — SERTRALINE HYDROCHLORIDE 100 MG/1
100 TABLET, FILM COATED ORAL DAILY
Qty: 90 TABLET | Refills: 1 | Status: SHIPPED | OUTPATIENT
Start: 2018-09-21 | End: 2019-03-04

## 2018-09-21 NOTE — LETTER
Sept 21, 2018            Lata Yaoharry            Please cancel her membership due to her medical conditions.            Edison Robin MD on 9/21/2018 at 2:25 PM                    Attn: Tonia

## 2018-09-21 NOTE — PROGRESS NOTES
SUBJECTIVE:   Norma F Alpers is a 83 year old female who presents to clinic today for the following health issues:      CHIEF COMPLAINT    Persistent cough.      HISTORY    Lata returns with persistent cough, hoarseness. She is having sinus congestion and post nasal drainage. She is having cough and some sputum, sometimes yellow.  She originally had strep throat a bit over a month ago.  She took penicillin.  Then a week or so ago she was seen again for possible sinusitis and took a Z-Jorge.  She is still symptomatic as noted above.    I note that she is on lisinopril but has taken this for many years and does not feel like it gives her a dry cough.  This is more of a wet cough that she describes above.    She has the additional concern of some buzzing in the left ear and diminished hearing in this ear.  It sounds like this is a fairly long-standing problem present for over a year.      Patient Active Problem List   Diagnosis     Advanced directives, counseling/discussion     Benign essential hypertension     Hyperlipidemia LDL goal <100     GERD (gastroesophageal reflux disease)     Aphasic stroke     Osteopenia     Acquired hypothyroidism     Anxiety     Thyroid nodule     Type 2 diabetes mellitus without complication, without long-term current use of insulin (H)     Irritable bowel syndrome with both constipation and diarrhea     Major depressive disorder with single episode, in partial remission (H)     Current Outpatient Prescriptions   Medication Sig Dispense Refill     albuterol (PROAIR HFA/PROVENTIL HFA/VENTOLIN HFA) 108 (90 Base) MCG/ACT inhaler Inhale 2 puffs into the lungs every 6 hours as needed for shortness of breath / dyspnea or wheezing 1 Inhaler 0     azithromycin (ZITHROMAX) 250 MG tablet Two tablets first day, then one tablet daily for four days. 6 tablet 0     benzonatate (TESSALON) 100 MG capsule Take 1 capsule (100 mg) by mouth 3 times daily as needed for cough 42 capsule 0     blood glucose  monitoring (ACCU-CHEK ANTONY SMARTVIEW) meter device kit Use to test blood sugar 1-2 times daily or as directed.  Ok to substitute alternative if insurance prefers. 1 kit 0     blood glucose monitoring (ACCU-CHEK SMARTVIEW) test strip Use to test blood sugar 2-3 times daily or as directed. 100 strip prn     Calcium Carbonate (CALCIUM 600 PO) Take 1 tablet by mouth daily 2 times daily       cefuroxime (CEFTIN) 500 MG tablet Take 1 tablet (500 mg) by mouth 2 times daily 20 tablet 0     cetirizine (ZYRTEC) 10 MG tablet Take 10 mg by mouth daily as needed for allergies       EPINEPHrine (EPIPEN) 0.3 MG/0.3ML injection Inject 0.3 mLs (0.3 mg) into the muscle once as needed for anaphylaxis 2 each 5     FLUZONE HIGH-DOSE 0.5 ML injection ADM 0.5ML IM UTD  0     glimepiride (AMARYL) 1 MG tablet Take 1 tablet (1 mg) by mouth every morning (before breakfast) 90 tablet 3     glucosamine-chondroitinoitin 750-600 MG TABS Take 1 tablet by mouth 2 times daily.         KLOR-CON 20 MEQ CR tablet TAKE 1 TABLET (20 MEQ) BY MOUTH 2 TIMES DAILY 180 tablet 2     lancets thin MISC 1 Device 2 times daily. 200 each prn     levothyroxine (SYNTHROID/LEVOTHROID) 75 MCG tablet Take 1 tablet (75 mcg) by mouth every morning 90 tablet 1     lisinopril-hydrochlorothiazide (PRINZIDE/ZESTORETIC) 10-12.5 MG per tablet Take 1 tablet by mouth daily 90 tablet 3     Multiple Vitamins-Minerals (MULTIVITAMIN ADULT) TABS Take 1 tablet by mouth daily       Multiple Vitamins-Minerals (PRESERVISION AREDS 2 PO) Take 1 tablet by mouth daily       omega-3 fatty acids (FISH OIL) 1200 MG capsule Take 1 capsule by mouth daily.       penicillin V potassium (VEETID) 500 MG tablet Take 1 tablet (500 mg) by mouth 3 times daily 30 tablet 0     pravastatin (PRAVACHOL) 80 MG tablet TAKE 1 TABLET (80 MG) BY MOUTH DAILY 90 tablet 0     predniSONE (DELTASONE) 10 MG tablet 2 daily for 5 days then 1 daily for 5 days. 15 tablet 0     psyllium (METAMUCIL) 58.6 % POWD Take by mouth  "daily       raloxifene (EVISTA) 60 MG tablet Take 1 tablet (60 mg) by mouth daily 90 tablet 3     ranitidine (ZANTAC) 150 MG tablet Take 1 tablet (150 mg) by mouth 2 times daily as needed for heartburn 60 tablet 5     sertraline (ZOLOFT) 100 MG tablet Take 1 tablet (100 mg) by mouth daily 90 tablet 1     traZODone (DESYREL) 150 MG tablet TAKE 1 TABLET (150 MG) BY MOUTH AT BEDTIME 90 tablet 1     TURMERIC PO Take 2 tablets by mouth daily        valACYclovir (VALTREX) 500 MG tablet TAKE 2 TABLET TWICE A DAY BY MOUTH X 2 DAYS FOR FLARES.  0     vitamin B complex with vitamin C (VITAMIN  B COMPLEX) TABS tablet Take 1 tablet by mouth daily       aspirin 325 MG tablet Take  by mouth daily.       [DISCONTINUED] levothyroxine (SYNTHROID/LEVOTHROID) 75 MCG tablet Take 1 tablet (75 mcg) by mouth every morning 90 tablet 3     [DISCONTINUED] sertraline (ZOLOFT) 100 MG tablet Take 1 tablet (100 mg) by mouth daily 90 tablet 3       REVIEW OF SYSTEMS    No fever or chills.  No S OB.  No chest pain.  No nausea.  She does have some dizziness.  No localized weakness.      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 No  BMI 30.34 kg/m2    Exam of both tympanic membranes is WNL.  Pharynx does not show significant redness and her tonsils are small without exudate.  Minimal enlargement of anterior cervical nodes noted.  No thyromegaly.  Chest clear  Cardiac rhythm regular without murmurs  No lower extremity " edema.  Speech clear.  Ambulates independently.      (J01.90) Subacute sinusitis, unspecified location  (primary encounter diagnosis)  Comment:   Plan: cefuroxime (CEFTIN) 500 MG tablet, predniSONE         (DELTASONE) 10 MG tablet        Patient advised to return for worsening or persistent symptoms.      (E03.9) Acquired hypothyroidism  Comment: Refill.  Plan: levothyroxine (SYNTHROID/LEVOTHROID) 75 MCG         tablet            (F41.9) Anxiety  Comment: Refill.  Plan: sertraline (ZOLOFT) 100 MG tablet            (H91.92) Hearing loss of left ear, unspecified hearing loss type  Comment:   Hearing loss noted.  She has some tinnitus.  Some symptomatology may be acute but it sounds like some of this is long-standing.  Best to have this checked out with ENT.  Plan: OTOLARYNGOLOGY REFERRAL

## 2018-09-21 NOTE — MR AVS SNAPSHOT
After Visit Summary   9/21/2018    Norma F Alpers    MRN: [id number]           Patient Information     Date Of Birth          10/29/1934        Visit Information        Provider Department      9/21/2018 2:00 PM Edison Robin MD [facility]        Today's Diagnoses     Subacute sinusitis, unspecified location    -  1    Acquired hypothyroidism        Anxiety        Hearing loss of left ear, unspecified hearing loss type          Care Instructions      Take prescribed medication as directed.      Follow up with Dr Kennedy.          Follow-ups after your visit        Additional Services     OTOLARYNGOLOGY REFERRAL       Your provider has referred you to: [facility]: [facility] [phone]   [email or web address]    Please be aware that coverage of these services is subject to the terms and limitations of your health insurance plan.  Call member services at your health plan with any benefit or coverage questions.      Please bring the following with you to your appointment:    (1) Any X-Rays, CTs or MRIs which have been performed.  Contact the facility where they were done to arrange for  prior to your scheduled appointment.   (2) List of current medications  (3) This referral request   (4) Any documents/labs given to you for this referral                  Who to contact     If you have questions or need follow up information about today's clinic visit or your schedule please contact [facility] directly at [phone].  Normal or non-critical lab and imaging results will be communicated to you by MyChart, letter or phone within 4 business days after the clinic has received the results. If you do not hear from us within 7 days, please contact the clinic through MyChart or phone. If you have a critical or abnormal lab result, we will notify you by phone as soon as possible.  Submit refill  "requests through "[employer] or call your pharmacy and they will forward the refill request to us. Please allow 3 business days for your refill to be completed.          Additional Information About Your Visit        Care EveryWhere ID     This is your Care EveryWhere ID. This could be used by other organizations to access your [place] medical records  [id number]        Your Vitals Were     Pulse Temperature Respirations Height Pulse Oximetry Breastfeeding?    115 98.1  F (36.7  C) (Oral) 24 5' 4.5\" (1.638 m) 97% No    BMI (Body Mass Index)                   30.34 kg/m2            Blood Pressure from Last 3 Encounters:   09/21/18 120/60   09/06/18 126/70   08/06/18 110/64    Weight from Last 3 Encounters:   09/21/18 179 lb 8 oz (81.4 kg)   09/06/18 179 lb (81.2 kg)   08/06/18 177 lb 9.6 oz (80.6 kg)              We Performed the Following     OTOLARYNGOLOGY REFERRAL          Today's Medication Changes          These changes are accurate as of 9/21/18  2:29 PM.  If you have any questions, ask your nurse or doctor.               Start taking these medicines.        Dose/Directions    cefuroxime 500 MG tablet   Commonly known as:  CEFTIN   Used for:  Subacute sinusitis, unspecified location   Started by:  Edison Robin MD        Dose:  500 mg   Take 1 tablet (500 mg) by mouth 2 times daily   Quantity:  20 tablet   Refills:  0       predniSONE 10 MG tablet   Commonly known as:  DELTASONE   Used for:  Subacute sinusitis, unspecified location   Started by:  Edison Robin MD        2 daily for 5 days then 1 daily for 5 days.   Quantity:  15 tablet   Refills:  0            Where to get your medicines      These medications were sent to [facility]/pharmacy #[id number] [place], [place] - [address]  [address], [place] [address]     Phone:  [phone]     cefuroxime 500 MG tablet    levothyroxine 75 MCG tablet    predniSONE 10 MG tablet    sertraline 100 MG tablet                Primary Care Provider " Office Phone # Fax #    Adelita Kennedy [phone]       [address] [facility] [address]  [facility] [address]        Equal Access to Services     STAS CÁRDENAS : Hadshanika eisenberg deshubert Maria Del Rosario, waeloisada luqhenrique, qaybta kamigelda mika, neto guadalupe mirnalyn butler laelvinlilliana pascual. So [facility] [phone].    ATENCIÓN: Si habla español, tiene a bro disposición servicios gratuitos de asistencia lingüística. Llame al [phone].    We comply with applicable federal civil rights laws and [place] laws. We do not discriminate on the basis of race, color, national origin, age, disability, sex, sexual orientation, or gender identity.            Thank you!     Thank you for choosing [facility]  for your care. Our goal is always to provide you with excellent care. Hearing back from our patients is one way we can continue to improve our services. Please take a few minutes to complete the written survey that you may receive in the mail after your visit with us. Thank you!             Your Updated Medication List - Protect others around you: Learn how to safely use, store and throw away your medicines at www.disposemymeds.org.          This list is accurate as of 9/21/18  2:29 PM.  Always use your most recent med list.                   Brand Name Dispense Instructions for use Diagnosis    albuterol 108 (90 Base) MCG/ACT inhaler    PROAIR HFA/PROVENTIL HFA/VENTOLIN HFA    1 Inhaler    Inhale 2 puffs into the lungs every 6 hours as needed for shortness of breath / dyspnea or wheezing    Wheezing       aspirin 325 MG tablet      Take  by mouth daily.        azithromycin 250 MG tablet    ZITHROMAX    6 tablet    Two tablets first day, then one tablet daily for four days.    Cough, Acute sinusitis with symptoms > 10 days       benzonatate 100 MG capsule    TESSALON    42 capsule    Take 1 capsule (100 mg) by mouth 3 times daily as needed for cough    Cough       blood glucose monitoring meter device kit     1  kit    Use to test blood sugar 1-2 times daily or as directed.  Ok to substitute alternative if insurance prefers.    Type 2 diabetes mellitus without complication, without long-term current use of insulin (H)       blood glucose monitoring test strip    ACCU-CHEK SMARTVIEW    100 strip    Use to test blood sugar 2-3 times daily or as directed.    Type 2 diabetes mellitus with other circulatory complications       CALCIUM 600 PO      Take 1 tablet by mouth daily 2 times daily        cefuroxime 500 MG tablet    CEFTIN    20 tablet    Take 1 tablet (500 mg) by mouth 2 times daily    Subacute sinusitis, unspecified location       cetirizine 10 MG tablet    zyrTEC     Take 10 mg by mouth daily as needed for allergies        EPINEPHrine 0.3 MG/0.3ML injection 2-pack    EPIPEN/ADRENACLICK/or ANY BX GENERIC EQUIV    2 each    Inject 0.3 mLs (0.3 mg) into the muscle once as needed for anaphylaxis    Bee allergy status       FLUZONE HIGH-DOSE 0.5 ML injection   Generic drug:  influenza Vac Split High-Dose      ADM 0.5ML IM UTD        glimepiride 1 MG tablet    AMARYL    90 tablet    Take 1 tablet (1 mg) by mouth every morning (before breakfast)    Type 2 diabetes mellitus without complication, without long-term current use of insulin (H)       glucosamine-chondroitinoitin 750-600 MG Tabs      Take 1 tablet by mouth 2 times daily.        KLOR-CON 20 MEQ CR tablet   Generic drug:  potassium chloride SA     180 tablet    TAKE 1 TABLET (20 MEQ) BY MOUTH 2 TIMES DAILY    Essential hypertension       levothyroxine 75 MCG tablet    SYNTHROID/LEVOTHROID    90 tablet    Take 1 tablet (75 mcg) by mouth every morning    Acquired hypothyroidism       lisinopril-hydrochlorothiazide 10-12.5 MG per tablet    PRINZIDE/ZESTORETIC    90 tablet    Take 1 tablet by mouth daily    Benign essential hypertension       omega-3 fatty acids 1200 MG capsule      Take 1 capsule by mouth daily.        penicillin V potassium 500 MG tablet    VEETID    30  tablet    Take 1 tablet (500 mg) by mouth 3 times daily    Acute streptococcal pharyngitis       pravastatin 80 MG tablet    PRAVACHOL    90 tablet    TAKE 1 TABLET (80 MG) BY MOUTH DAILY    Hyperlipidemia LDL goal <100       predniSONE 10 MG tablet    DELTASONE    15 tablet    2 daily for 5 days then 1 daily for 5 days.    Subacute sinusitis, unspecified location       * PRESERVISION AREDS 2 PO      Take 1 tablet by mouth daily        * MULTIVITAMIN ADULT Tabs      Take 1 tablet by mouth daily        psyllium 58.6 % Powd    METAMUCIL     Take by mouth daily        raloxifene 60 MG tablet    Evista    90 tablet    Take 1 tablet (60 mg) by mouth daily    Osteopenia, unspecified location       ranitidine 150 MG tablet    ZANTAC    60 tablet    Take 1 tablet (150 mg) by mouth 2 times daily as needed for heartburn    Gastroesophageal reflux disease without esophagitis       sertraline 100 MG tablet    ZOLOFT    90 tablet    Take 1 tablet (100 mg) by mouth daily    Anxiety       thin lancets    NO BRAND SPECIFIED    200 each    1 Device 2 times daily.    Type 2 diabetes, HbA1C goal < 8% (H)       traZODone 150 MG tablet    DESYREL    90 tablet    TAKE 1 TABLET (150 MG) BY MOUTH AT BEDTIME    Insomnia, unspecified type       TURMERIC PO      Take 2 tablets by mouth daily        valACYclovir 500 MG tablet    VALTREX     TAKE 2 TABLET TWICE A DAY BY MOUTH X 2 DAYS FOR FLARES.        vitamin B complex with vitamin C Tabs tablet      Take 1 tablet by mouth daily        * Notice:  This list has 2 medication(s) that are the same as other medications prescribed for you. Read the directions carefully, and ask your doctor or other care provider to review them with you.

## 2018-10-09 ENCOUNTER — OFFICE VISIT (OUTPATIENT)
Dept: INTERNAL MEDICINE | Facility: CLINIC | Age: 83
End: 2018-10-09
Payer: COMMERCIAL

## 2018-10-09 VITALS
TEMPERATURE: 98.6 F | WEIGHT: 176 LBS | OXYGEN SATURATION: 95 % | SYSTOLIC BLOOD PRESSURE: 122 MMHG | HEIGHT: 65 IN | BODY MASS INDEX: 29.32 KG/M2 | HEART RATE: 90 BPM | DIASTOLIC BLOOD PRESSURE: 74 MMHG

## 2018-10-09 DIAGNOSIS — R49.0 RASPY VOICE: ICD-10-CM

## 2018-10-09 DIAGNOSIS — E11.59 TYPE 2 DIABETES MELLITUS WITH OTHER CIRCULATORY COMPLICATIONS (H): Primary | ICD-10-CM

## 2018-10-09 DIAGNOSIS — H93.12 TINNITUS, LEFT: ICD-10-CM

## 2018-10-09 DIAGNOSIS — E03.9 ACQUIRED HYPOTHYROIDISM: ICD-10-CM

## 2018-10-09 DIAGNOSIS — I10 BENIGN ESSENTIAL HYPERTENSION: ICD-10-CM

## 2018-10-09 DIAGNOSIS — F32.4 MAJOR DEPRESSIVE DISORDER WITH SINGLE EPISODE, IN PARTIAL REMISSION (H): ICD-10-CM

## 2018-10-09 LAB — HBA1C MFR BLD: 6.4 % (ref 0–5.6)

## 2018-10-09 PROCEDURE — 82043 UR ALBUMIN QUANTITATIVE: CPT | Performed by: INTERNAL MEDICINE

## 2018-10-09 PROCEDURE — 83036 HEMOGLOBIN GLYCOSYLATED A1C: CPT | Performed by: INTERNAL MEDICINE

## 2018-10-09 PROCEDURE — 99214 OFFICE O/P EST MOD 30 MIN: CPT | Performed by: INTERNAL MEDICINE

## 2018-10-09 PROCEDURE — 36415 COLL VENOUS BLD VENIPUNCTURE: CPT | Performed by: INTERNAL MEDICINE

## 2018-10-09 PROCEDURE — 80061 LIPID PANEL: CPT | Performed by: INTERNAL MEDICINE

## 2018-10-09 RX ORDER — LANCETS
1 EACH MISCELLANEOUS DAILY
Qty: 100 EACH | Refills: 4 | Status: SHIPPED | OUTPATIENT
Start: 2018-10-09

## 2018-10-09 RX ORDER — LANCETS
1 EACH MISCELLANEOUS 2 TIMES DAILY
Qty: 200 EACH | Refills: 3 | Status: SHIPPED | OUTPATIENT
Start: 2018-10-09 | End: 2018-10-09

## 2018-10-09 NOTE — PROGRESS NOTES
SUBJECTIVE:   Norma F Alpers is a 83 year old female who presents to clinic today for the following health issues:      Diabetes Follow-up      Patient is checking blood sugars: 1 times a week    Diabetic concerns: None     Symptoms of hypoglycemia (low blood sugar): none     Paresthesias (numbness or burning in feet) or sores: No     Date of last diabetic eye exam: 10/1/18    Diabetes Management Resources    Hyperlipidemia Follow-Up      Rate your low fat/cholesterol diet?: good    Taking statin?  Yes, no muscle aches from statin    Other lipid medications/supplements?:  none    Hypertension Follow-up      Outpatient blood pressures not recently    Low Salt Diet: low salt    BP Readings from Last 2 Encounters:   09/21/18 120/60   09/06/18 126/70     Hemoglobin A1C (%)   Date Value   05/01/2018 6.6 (H)   10/12/2017 6.6 (H)     LDL Cholesterol Calculated (mg/dL)   Date Value   10/12/2017 109 (H)   05/15/2017 118 (H)     Depression and Anxiety Follow-Up    Status since last visit: slightly worsened secondary to mood    Other associated symptoms:None    Complicating factors:     Significant life event: No     Current substance abuse: None    PHQ 9/23/2015 8/24/2017 5/6/2018   PHQ-9 Total Score 16 7 13   Q9: Suicide Ideation Not at all Not at all Not at all     JENNY-7 SCORE 9/23/2015 2/28/2017 5/6/2018   Total Score 11 7 8       PHQ-9  English  PHQ-9   Any Language  JENNY-7  Suicide Assessment Five-step Evaluation and Treatment (SAFE-T)  Hypothyroidism Follow-up      Since last visit, patient describes the following symptoms: Weight stable, no hair loss, no skin changes, no constipation, no loose stools      Amount of exercise or physical activity: None    Problems taking medications regularly: No    Medication side effects: none    Diet: regular (no restrictions)            Problem list and histories reviewed & adjusted, as indicated.  Additional history: as documented    Labs reviewed in EPIC    Reviewed and updated as  "needed this visit by clinical staff  Tobacco  Allergies  Med Hx  Surg Hx  Fam Hx  Soc Hx      Reviewed and updated as needed this visit by Provider         ROS:  CONSTITUTIONAL: NEGATIVE for fever, chills, change in weight  ENT/MOUTH: still has a raspy voice and runny nose; tinnitus  RESP: NEGATIVE for significant cough or SOB  CV: NEGATIVE for chest pain, palpitations or peripheral edema  T    OBJECTIVE:     /74  Pulse 90  Temp 98.6  F (37  C) (Oral)  Ht 5' 4.5\" (1.638 m)  Wt 176 lb (79.8 kg)  SpO2 95%  Breastfeeding? No  BMI 29.74 kg/m2  Body mass index is 29.74 kg/(m^2).  GENERAL: healthy, alert and no distress  RESP: lungs clear to auscultation - no rales, rhonchi or wheezes  CV: regular rate and rhythm, normal S1 S2, no S3 or S4, no murmur, click or rub  ASSESSMENT/PLAN:       (E11.59) Type 2 diabetes mellitus with other circulatory complications (H)  (primary encounter diagnosis)  Comment: assess  Plan: blood glucose monitoring (ACCU-CHEK SMARTVIEW)         test strip, Lipid panel reflex to direct LDL         Fasting, Hemoglobin A1c, Albumin Random Urine         Quantitative with Creat Ratio, thin (NO BRAND         SPECIFIED) lancets, DISCONTINUED: thin (NO         BRAND SPECIFIED) lancets            (F32.4) Major depressive disorder with single episode, in partial remission (H)  Comment:   Plan: MENTAL HEALTH REFERRAL  - Adult; Outpatient         Treatment; Individual/Couples/Family/Group         The[place]/Health Psychology[place] [facility] [facility] [phone]; We will         contact you to schedule the appointment or         please call with any questions            (I10) Benign essential hypertension  Comment: at goal  Plan: prinzide    (E03.9) Acquired hypothyroidism  Comment:   Plan: levothyroxine    (R49.0) Raspy voice  Comment:   Plan: OTOLARYNGOLOGY REFERRAL        Pt plans on trying flonase    (H93.12) Tinnitus, left  Comment:   Plan: OTOLARYNGOLOGY REFERRAL        "         Anika Ibrahim MD  [facility]

## 2018-10-09 NOTE — LETTER
October 10, 2018      Norma F Alpers  [address]   [facility] [address]        Dear Ms.Alpers,    We are writing to inform you of your test results.    The hemoglobin A1c is at goal of less than 8.0%.  The cholesterol panel is close to goal.  Keep taking cholesterol medication and working on diet and exercise.  The test for protein in the urine was within acceptable limits    Resulted Orders   Lipid panel reflex to direct LDL Fasting   Result Value Ref Range    Cholesterol 194 <200 mg/dL    Triglycerides 192 (H) <150 mg/dL      Comment:      Borderline high:  150-199 mg/dl  High:             200-499 mg/dl  Very high:       >499 mg/dl  Fasting specimen      HDL Cholesterol 51 >49 mg/dL    LDL Cholesterol Calculated 105 (H) <100 mg/dL      Comment:      Above desirable:  100-129 mg/dl  Borderline High:  130-159 mg/dL  High:             160-189 mg/dL  Very high:       >189 mg/dl      Non HDL Cholesterol 143 (H) <130 mg/dL      Comment:      Above Desirable:  130-159 mg/dl  Borderline high:  160-189 mg/dl  High:             190-219 mg/dl  Very high:       >219 mg/dl     Hemoglobin A1c   Result Value Ref Range    Hemoglobin A1C 6.4 (H) 0 - 5.6 %      Comment:      Normal <5.7% Prediabetes 5.7-6.4%  Diabetes 6.5% or higher - adopted from ADA   consensus guidelines.     Albumin Random Urine Quantitative with Creat Ratio   Result Value Ref Range    Creatinine Urine 114 mg/dL    Albumin Urine mg/L 26 mg/L    Albumin Urine mg/g Cr 22.63 0 - 25 mg/g Cr       If you have any questions or concerns, please call the clinic at the number listed above.       Sincerely,        Anika Ibrahim MD

## 2018-10-09 NOTE — PATIENT INSTRUCTIONS
Call to schedule for ringing in the ear and raspy voice.    Trial of flonase for possible postnasal drip causing the raspy voice.

## 2018-10-09 NOTE — MR AVS SNAPSHOT
After Visit Summary   10/9/2018    Norma F Alpers    MRN: [id number]           Patient Information     Date Of Birth          10/29/1934        Visit Information        Provider Department      10/9/2018 11:40 AM Anika Ibrahim MD [facility]        Today's Diagnoses     Type 2 diabetes mellitus with other circulatory complications (H)    -  1    Major depressive disorder with single episode, in partial remission (H)        Benign essential hypertension        Acquired hypothyroidism        Raspy voice        Tinnitus, left          Care Instructions    Call to schedule for ringing in the ear and raspy voice.    Trial of flonase for possible postnasal drip causing the raspy voice.          Follow-ups after your visit        Additional Services     OTOLARYNGOLOGY REFERRAL       Your provider has referred you to: [facility]: [facility] [phone]   [email or web address]    Please be aware that coverage of these services is subject to the terms and limitations of your health insurance plan.  Call member services at your health plan with any benefit or coverage questions.      Please bring the following with you to your appointment:    (1) Any X-Rays, CTs or MRIs which have been performed.  Contact the facility where they were done to arrange for  prior to your scheduled appointment.   (2) List of current medications  (3) This referral request   (4) Any documents/labs given to you for this referral                  Follow-up notes from your care team     Return in about 3 months (around 1/9/2019).      Who to contact     If you have questions or need follow up information about today's clinic visit or your schedule please contact [facility] directly at [phone].  Normal or non-critical lab and imaging results will be communicated to you by MyChart, letter or phone within 4  "business days after the clinic has received the results. If you do not hear from us within 7 days, please contact the clinic through [employer] or phone. If you have a critical or abnormal lab result, we will notify you by phone as soon as possible.  Submit refill requests through [employer] or call your pharmacy and they will forward the refill request to us. Please allow 3 business days for your refill to be completed.          Additional Information About Your Visit        Care EveryWhere ID     This is your Care EveryWhere ID. This could be used by other organizations to access your [place] medical records  [id number]        Your Vitals Were     Pulse Temperature Height Pulse Oximetry Breastfeeding? BMI (Body Mass Index)    90 98.6  F (37  C) (Oral) 5' 4.5\" (1.638 m) 95% No 29.74 kg/m2       Blood Pressure from Last 3 Encounters:   10/09/18 122/74   09/21/18 120/60   09/06/18 126/70    Weight from Last 3 Encounters:   10/09/18 176 lb (79.8 kg)   09/21/18 179 lb 8 oz (81.4 kg)   09/06/18 179 lb (81.2 kg)              We Performed the Following     Albumin Random Urine Quantitative with Creat Ratio     Hemoglobin A1c     Lipid panel reflex to direct LDL Fasting     OTOLARYNGOLOGY REFERRAL          Today's Medication Changes          These changes are accurate as of 10/9/18 12:25 PM.  If you have any questions, ask your nurse or doctor.               Start taking these medicines.        Dose/Directions    thin lancets   Commonly known as:  NO BRAND SPECIFIED   Used for:  Type 2 diabetes mellitus with other circulatory complications (H)   Started by:  Anika Ibrahim MD        Dose:  1 each   1 each daily   Quantity:  100 each   Refills:  4            Where to get your medicines      These medications were sent to [facility]/pharmacy #[id number] - [place], [place] - [address]  [address], [place] [address]     Phone:  [phone]     blood glucose monitoring test strip    thin lancets                " Primary Care Provider Office Phone # Fax #    Adelita Kennedy [phone]       [address] [facility] [address]  [facility] [address]        Equal Access to Services     STAS CÁRDENAS : Hadshanika jodie eisenberg pam Mix, waaxda luqadaha, qaybta kaalmada mika, neto butler laAmegabino pascual. So [facility] [phone].    ATENCIÓN: Si habla español, tiene a bro disposición servicios gratuitos de asistencia lingüística. Llame al [phone].    We comply with applicable federal civil rights laws and [place] laws. We do not discriminate on the basis of race, color, national origin, age, disability, sex, sexual orientation, or gender identity.            Thank you!     Thank you for choosing [facility]  for your care. Our goal is always to provide you with excellent care. Hearing back from our patients is one way we can continue to improve our services. Please take a few minutes to complete the written survey that you may receive in the mail after your visit with us. Thank you!             Your Updated Medication List - Protect others around you: Learn how to safely use, store and throw away your medicines at www.disposemymeds.org.          This list is accurate as of 10/9/18 12:25 PM.  Always use your most recent med list.                   Brand Name Dispense Instructions for use Diagnosis    aspirin 325 MG tablet      Take  by mouth daily.        blood glucose monitoring meter device kit     1 kit    Use to test blood sugar 1-2 times daily or as directed.  Ok to substitute alternative if insurance prefers.    Type 2 diabetes mellitus without complication, without long-term current use of insulin (H)       blood glucose monitoring test strip    ACCU-CHEK SMARTVIEW    100 strip    Use to test blood sugar 2-3 times daily or as directed.    Type 2 diabetes mellitus with other circulatory complications (H)       CALCIUM 600 PO      Take 1 tablet by mouth daily 2 times daily        cetirizine  10 MG tablet    zyrTEC     Take 10 mg by mouth daily as needed for allergies        EPINEPHrine 0.3 MG/0.3ML injection 2-pack    EPIPEN/ADRENACLICK/or ANY BX GENERIC EQUIV    2 each    Inject 0.3 mLs (0.3 mg) into the muscle once as needed for anaphylaxis    Bee allergy status       FLUZONE HIGH-DOSE 0.5 ML injection   Generic drug:  influenza Vac Split High-Dose      ADM 0.5ML IM UTD        glimepiride 1 MG tablet    AMARYL    90 tablet    Take 1 tablet (1 mg) by mouth every morning (before breakfast)    Type 2 diabetes mellitus without complication, without long-term current use of insulin (H)       glucosamine-chondroitinoitin 750-600 MG Tabs      Take 1 tablet by mouth 2 times daily.        KLOR-CON 20 MEQ CR tablet   Generic drug:  potassium chloride SA     180 tablet    TAKE 1 TABLET (20 MEQ) BY MOUTH 2 TIMES DAILY    Essential hypertension       levothyroxine 75 MCG tablet    SYNTHROID/LEVOTHROID    90 tablet    Take 1 tablet (75 mcg) by mouth every morning    Acquired hypothyroidism       lisinopril-hydrochlorothiazide 10-12.5 MG per tablet    PRINZIDE/ZESTORETIC    90 tablet    Take 1 tablet by mouth daily    Benign essential hypertension       omega-3 fatty acids 1200 MG capsule      Take 1 capsule by mouth daily.        pravastatin 80 MG tablet    PRAVACHOL    90 tablet    TAKE 1 TABLET (80 MG) BY MOUTH DAILY    Hyperlipidemia LDL goal <100       * PRESERVISION AREDS 2 PO      Take 1 tablet by mouth daily        * MULTIVITAMIN ADULT Tabs      Take 1 tablet by mouth daily        psyllium 58.6 % Powd    METAMUCIL     Take by mouth daily        raloxifene 60 MG tablet    Evista    90 tablet    Take 1 tablet (60 mg) by mouth daily    Osteopenia, unspecified location       ranitidine 150 MG tablet    ZANTAC    60 tablet    Take 1 tablet (150 mg) by mouth 2 times daily as needed for heartburn    Gastroesophageal reflux disease without esophagitis       sertraline 100 MG tablet    ZOLOFT    90 tablet    Take 1  tablet (100 mg) by mouth daily    Anxiety       thin lancets    NO BRAND SPECIFIED    100 each    1 each daily    Type 2 diabetes mellitus with other circulatory complications (H)       traZODone 150 MG tablet    DESYREL    90 tablet    TAKE 1 TABLET (150 MG) BY MOUTH AT BEDTIME    Insomnia, unspecified type       TURMERIC PO      Take 2 tablets by mouth daily        valACYclovir 500 MG tablet    VALTREX     TAKE 2 TABLET TWICE A DAY BY MOUTH X 2 DAYS FOR FLARES.        vitamin B complex with vitamin C Tabs tablet      Take 1 tablet by mouth daily        * Notice:  This list has 2 medication(s) that are the same as other medications prescribed for you. Read the directions carefully, and ask your doctor or other care provider to review them with you.

## 2018-10-09 NOTE — NURSING NOTE
"/74  Pulse 90  Temp 98.6  F (37  C) (Oral)  Ht 5' 4.5\" (1.638 m)  Wt 176 lb (79.8 kg)  SpO2 95%  Breastfeeding? No  BMI 29.74 kg/m2    "

## 2018-10-10 LAB
CHOLEST SERPL-MCNC: 194 MG/DL
CREAT UR-MCNC: 114 MG/DL
HDLC SERPL-MCNC: 51 MG/DL
LDLC SERPL CALC-MCNC: 105 MG/DL
MICROALBUMIN UR-MCNC: 26 MG/L
MICROALBUMIN/CREAT UR: 22.63 MG/G CR (ref 0–25)
NONHDLC SERPL-MCNC: 143 MG/DL
TRIGL SERPL-MCNC: 192 MG/DL

## 2018-10-10 ASSESSMENT — PATIENT HEALTH QUESTIONNAIRE - PHQ9: SUM OF ALL RESPONSES TO PHQ QUESTIONS 1-9: 15

## 2018-10-25 DIAGNOSIS — M85.80 OSTEOPENIA, UNSPECIFIED LOCATION: ICD-10-CM

## 2018-10-25 DIAGNOSIS — E11.9 TYPE 2 DIABETES MELLITUS WITHOUT COMPLICATION, WITHOUT LONG-TERM CURRENT USE OF INSULIN (H): ICD-10-CM

## 2018-10-25 RX ORDER — GLIMEPIRIDE 1 MG/1
TABLET ORAL
Qty: 90 TABLET | Refills: 2 | Status: SHIPPED | OUTPATIENT
Start: 2018-10-25 | End: 2018-12-11

## 2018-10-25 RX ORDER — RALOXIFENE HYDROCHLORIDE 60 MG/1
TABLET, FILM COATED ORAL
Qty: 90 TABLET | Refills: 0 | Status: SHIPPED | OUTPATIENT
Start: 2018-10-25 | End: 2019-02-19

## 2018-10-25 NOTE — TELEPHONE ENCOUNTER
Requested Prescriptions   Pending Prescriptions Disp Refills     glimepiride (AMARYL) 1 MG tablet [Pharmacy Med Name: GLIMEPIRIDE 1 MG TABLET] 90 tablet 2    Last Written Prescription Date:  10/21/2017  Last Fill Quantity: 90,  # refills: 3   Last office visit: 10/9/2018 with prescribing provider:     Future Office Visit:   Next 5 appointments (look out 90 days)     Nov 21, 2018  1:00 PM CST   Return Visit with LEXI John   [facility] ([facility])    [address] [facility] [address] [phone]                Sig: TAKE 1 TABLET (1 MG) BY MOUTH EVERY MORNING (BEFORE BREAKFAST)    Sulfonylurea Agents Passed    10/25/2018  2:13 AM       Passed - Blood pressure less than 140/90 in past 6 months    BP Readings from Last 3 Encounters:   10/09/18 122/74   09/21/18 120/60   09/06/18 126/70                Passed - Patient has documented LDL within the past 12 mos.    Recent Labs   Lab Test  10/09/18   1229   LDL  105*            Passed - Patient has had a Microalbumin in the past 12 mos.    Recent Labs   Lab Test  10/09/18   1230   MICROL  26   UMALCR  22.63            Passed - Patient has documented A1c within the specified period of time.    If HgbA1C is 8 or greater, it needs to be on file within the past 3 months.  If less than 8, must be on file within the past 6 months.     Recent Labs   Lab Test  10/09/18   1229   A1C  6.4*            Passed - Patient is age 18 or older       Passed - No active pregnancy on record       Passed - Patient has a recent creatinine (normal) within the past 12 mos.    Recent Labs   Lab Test  05/01/18   1532   07/17/17   1525   CR  0.63   < >   --    CREAT   --    --   0.7    < > = values in this interval not displayed.            Passed - Patient has not had a positive pregnancy test within the past 12 mos.       Passed - Recent (6 mo) or future (30 days) visit within the authorizing provider's specialty    Patient had office visit in the  "last 6 months or has a visit in the next 30 days with authorizing provider or within the authorizing provider's specialty.  See \"Patient Info\" tab in inbasket, or \"Choose Columns\" in Meds & Orders section of the refill encounter.            raloxifene (EVISTA) 60 MG tablet [Pharmacy Med Name: RALOXIFENE HCL 60 MG TABLET] 90 tablet 3    Last Written Prescription Date:  10/21/2017  Last Fill Quantity: 90,  # refills: 3   Last office visit: 10/9/2018 with prescribing provider:     Future Office Visit:   Next 5 appointments (look out 90 days)     Nov 21, 2018  1:00 PM CST   Return Visit with LEXI John   [facility] ([facility])    [id number] [place] [address] [phone]                Sig: TAKE 1 TABLET (60 MG) BY MOUTH DAILY    Bisphosphonates Failed    10/25/2018  2:13 AM       Failed - Dexa on file within past 2 years    Please review last Dexa result.          Passed - Recent (12 mo) or future (30 days) visit within the authorizing provider's specialty    Patient had office visit in the last 12 months or has a visit in the next 30 days with authorizing provider or within the authorizing provider's specialty.  See \"Patient Info\" tab in inbasket, or \"Choose Columns\" in Meds & Orders section of the refill encounter.             Passed - Patient is age 18 or older       Passed - Normal serum creatinine on file within past 12 months    Recent Labs   Lab Test  05/01/18   1532   07/17/17   1525   CR  0.63   < >   --    CREAT   --    --   0.7    < > = values in this interval not displayed.             "

## 2018-10-25 NOTE — TELEPHONE ENCOUNTER
Glimepiride:  Prescription approved per [facility] Refill Protocol.    Raloxifene:  Medication is being filled for 1 time refill only due to:  Due for Dexa Scan. Letter mailed to pharmacy

## 2018-10-25 NOTE — LETTER
[facility]  [address], [address]  [place], [place]  [address]                                            TEL:[phone]  FAX:[phone]      Norma F Alpers  [address]   [facility] [address]      October 25, 2018        Dear Lata,    We have received a refill request from your pharmacy, however, we were only able to provide a one time fill because you are due for your DEXA scan. Please call [phone] to schedule an appointment before you are due for your next refill.      Thank you,     LINNEA Manriquez

## 2018-10-29 ENCOUNTER — TRANSFERRED RECORDS (OUTPATIENT)
Dept: HEALTH INFORMATION MANAGEMENT | Facility: CLINIC | Age: 83
End: 2018-10-29

## 2018-11-05 DIAGNOSIS — E11.59 TYPE 2 DIABETES MELLITUS WITH OTHER CIRCULATORY COMPLICATIONS (H): ICD-10-CM

## 2018-11-05 NOTE — TELEPHONE ENCOUNTER
"Requested Prescriptions   Pending Prescriptions Disp Refills     ACCU-CHEK SMARTVIEW test strip [Pharmacy Med Name: ACCU-CHEK SMARTVIEW TEST STRIP]  Last Written Prescription Date:  10/9/2018  Last Fill Quantity: 100,  # refills: pm   Last office visit: 10/9/2018 with prescribing provider:     Future Office Visit:   Next 5 appointments (look out 90 days)     Nov 21, 2018  1:00 PM CST   Return Visit with LEXI John   [facility] ([facility])    [address] [facility] [address] [phone]                100 strip 0     Sig: USE TO TEST BLOOD SUGAR 2-3 TIMES DAILY OR AS DIRECTED.    Diabetic Supplies Protocol Passed    11/5/2018  9:03 AM       Passed - Patient is 18 years of age or older       Passed - Recent (6 mo) or future (30 days) visit within the authorizing provider's specialty    Patient had office visit in the last 6 months or has a visit in the next 30 days with authorizing provider.  See \"Patient Info\" tab in inbasket, or \"Choose Columns\" in Meds & Orders section of the refill encounter.            "

## 2018-11-06 RX ORDER — BLOOD SUGAR DIAGNOSTIC
STRIP MISCELLANEOUS
Qty: 100 STRIP | Refills: 0 | OUTPATIENT
Start: 2018-11-06

## 2018-11-06 NOTE — TELEPHONE ENCOUNTER
Patient has refills remaining with pharmacy.  Anjana Oropeza RN - Triage  [facility]

## 2018-11-12 DIAGNOSIS — E78.5 HYPERLIPIDEMIA LDL GOAL <100: ICD-10-CM

## 2018-11-12 NOTE — TELEPHONE ENCOUNTER
"Requested Prescriptions   Pending Prescriptions Disp Refills     pravastatin (PRAVACHOL) 80 MG tablet [Pharmacy Med Name: PRAVASTATIN SODIUM 80 MG TAB]  Last Written Prescription Date:  8/14/2018  Last Fill Quantity: 90,  # refills: 0  Last office visit: 10/9/2018 with prescribing provider:     Future Office Visit:   Next 5 appointments (look out 90 days)     Nov 21, 2018  1:00 PM CST   Return Visit with LEXI John   [facility] ([facility])    [address] [address] [phone]                90 tablet 0     Sig: TAKE 1 TABLET (80 MG) BY MOUTH DAILY    Statins Protocol Passed    11/12/2018  1:55 AM       Passed - LDL on file in past 12 months    Recent Labs   Lab Test  10/09/18   1229   LDL  105*            Passed - No abnormal creatine kinase in past 12 months    No lab results found.            Passed - Recent (12 mo) or future (30 days) visit within the authorizing provider's specialty    Patient had office visit in the last 12 months or has a visit in the next 30 days with authorizing provider or within the authorizing provider's specialty.  See \"Patient Info\" tab in inbasket, or \"Choose Columns\" in Meds & Orders section of the refill encounter.             Passed - Patient is age 18 or older       Passed - No active pregnancy on record       Passed - No positive pregnancy test in past 12 months        "

## 2018-11-13 RX ORDER — PRAVASTATIN SODIUM 80 MG/1
TABLET ORAL
Qty: 90 TABLET | Refills: 1 | Status: SHIPPED | OUTPATIENT
Start: 2018-11-13 | End: 2019-03-04

## 2018-11-13 NOTE — TELEPHONE ENCOUNTER
Prescription approved per [facility] Refill Protocol.  Jovana Velazquez RN

## 2018-11-15 ENCOUNTER — OFFICE VISIT (OUTPATIENT)
Dept: PSYCHOLOGY | Facility: CLINIC | Age: 83
End: 2018-11-15
Attending: INTERNAL MEDICINE
Payer: COMMERCIAL

## 2018-11-15 DIAGNOSIS — F32.0 MAJOR DEPRESSIVE DISORDER, SINGLE EPISODE, MILD WITH ANXIOUS DISTRESS (H): Primary | ICD-10-CM

## 2018-11-15 PROCEDURE — 90791 PSYCH DIAGNOSTIC EVALUATION: CPT | Performed by: MARRIAGE & FAMILY THERAPIST

## 2018-11-15 ASSESSMENT — PATIENT HEALTH QUESTIONNAIRE - PHQ9
SUM OF ALL RESPONSES TO PHQ QUESTIONS 1-9: 7
5. POOR APPETITE OR OVEREATING: SEVERAL DAYS

## 2018-11-15 ASSESSMENT — ANXIETY QUESTIONNAIRES
5. BEING SO RESTLESS THAT IT IS HARD TO SIT STILL: NOT AT ALL
6. BECOMING EASILY ANNOYED OR IRRITABLE: NOT AT ALL
IF YOU CHECKED OFF ANY PROBLEMS ON THIS QUESTIONNAIRE, HOW DIFFICULT HAVE THESE PROBLEMS MADE IT FOR YOU TO DO YOUR WORK, TAKE CARE OF THINGS AT HOME, OR GET ALONG WITH OTHER PEOPLE: SOMEWHAT DIFFICULT
3. WORRYING TOO MUCH ABOUT DIFFERENT THINGS: MORE THAN HALF THE DAYS
2. NOT BEING ABLE TO STOP OR CONTROL WORRYING: SEVERAL DAYS
1. FEELING NERVOUS, ANXIOUS, OR ON EDGE: SEVERAL DAYS
GAD7 TOTAL SCORE: 7
7. FEELING AFRAID AS IF SOMETHING AWFUL MIGHT HAPPEN: MORE THAN HALF THE DAYS

## 2018-11-15 NOTE — Clinical Note
Hi,  Pt attended intake therapy session at [facility] on 11/15/18. Dx of Major Depression - Mild. Their next scheduled session is 11/21/18.  Will collaborate throughout treatment as needed. Regards Rodolfo Lima MA, LMFT, LICSW

## 2018-11-15 NOTE — MR AVS SNAPSHOT
MRN:[id number]                      After Visit Summary   11/15/2018    Norma F Alpers    MRN: [id number]           Visit Information        Provider Department      11/15/2018 10:00 AM Rodolfo Lima LMFT [facility] Generic      Your next 10 appointments already scheduled     Nov 21, 2018  1:00 PM CST   Return Visit with Rodolfo LimaLEXI   [facility] ([facility])    [address] [facility] [address] [phone]            Nov 23, 2018  3:00 PM CST   DX HIP/PELVIS/SPINE with RIDX1   [facility]    [address]  [facility] [address] [phone]           How do I prepare for my exam? (Food and drink instructions) No Food and Drink Restrictions.  How do I prepare for my exam? (Other instructions) Please do not take any of the following 24 hours prior to the day of your exam: vitamins, calcium tablets, antacids.  What should I wear: If possible, please wear clothes without metal (snaps, zippers). A sweat suit works well.  How long does the exam take: The exam takes about 20 minutes.  What should I bring: Bring a list of your current medicines to your exam (including vitamins, minerals and over-the-counter drugs).  Do I need a :  No  is needed.  What should I do after the exam: No restrictions, You may resume normal activities.  How do I prepare for my exam? (Food and drink instructions) A DEXA scan is a bone-density scan. It uses a low level of radiation to check the strength of your bones. As you lie on a padded table, a machine will take X-rays. We most often scan the hips and lower spine.  Who should I call with questions: If you have any questions, please call the Imaging Department where you will have your exam. Directions, parking instructions, and other information is available on our website, [email or web address].            Dec  "2018  2:00 PM CST   Return Visit with LEXI John   [facility] ([facility])    [address] [facility] [address] [phone]              MyChart Information     [employer] lets you send messages to your doctor, view your test results, renew your prescriptions, schedule appointments and more. To sign up, go to [email or web address] . Click on \"Log in\" on the left side of the screen, which will take you to the Welcome page. Then click on \"Sign up Now\" on the right side of the page.     You will be asked to enter the access code listed below, as well as some personal information. Please follow the directions to create your username and password.     Your access code is: [email or web address]  Expires: 2019  4:20 PM     Your access code will  in 90 days. If you need help or a new code, please call your [place] clinic or [phone].        Care EveryWhere ID     This is your Care EveryWhere ID. This could be used by other organizations to access your [place] medical records  [id number]        Equal Access to Services     STAS CÁRDENAS AH: Hadii jodie Mix, waeloisada shahid, qaybta kaalmada mika, neto pascual. So [facility] [phone].    ATENCIÓN: Si habla español, tiene a bro disposición servicios gratuitos de asistencia lingüística. Kyree al [phone].    We comply with applicable federal civil rights laws and [place] laws. We do not discriminate on the basis of race, color, national origin, age, disability, sex, sexual orientation, or gender identity.            "
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
## 2018-11-16 ASSESSMENT — ANXIETY QUESTIONNAIRES: GAD7 TOTAL SCORE: 7

## 2018-11-21 ENCOUNTER — OFFICE VISIT (OUTPATIENT)
Dept: PSYCHOLOGY | Facility: CLINIC | Age: 83
End: 2018-11-21
Attending: INTERNAL MEDICINE
Payer: COMMERCIAL

## 2018-11-21 DIAGNOSIS — F32.0 MAJOR DEPRESSIVE DISORDER, SINGLE EPISODE, MILD WITH ANXIOUS DISTRESS (H): Primary | ICD-10-CM

## 2018-11-21 PROCEDURE — 90834 PSYTX W PT 45 MINUTES: CPT | Performed by: MARRIAGE & FAMILY THERAPIST

## 2018-11-21 NOTE — PROGRESS NOTES
Progress Note    Client Name: Norma F Alpers  Date: 11/21/2018         Service Type: Individual      Session Start Time: 1pm  Session End Time: 1:45pm      Session Length: 45 minutes     Session #: 2     Attendees: Client attended alone    Treatment Plan Last Reviewed: developing Tx plan  PHQ-9 / JENNY-7 : 11/15/18     DATA      Progress Since Last Session (Related to Symptoms / Goals / Homework):   Symptoms: No change since first session    Homework: Completed in session      Episode of Care Goals: Satisfactory progress - PREPARATION (Decided to change - considering how); Intervened by negotiating a change plan and determining options / strategies for behavior change, identifying triggers, exploring social supports, and working towards setting a date to begin behavior change     Current / Ongoing Stressors and Concerns:   - Sx of depression adjusting to life as a  and stress of her daughter's cancer   - feeling lonely from 's loss.   Client started to develop her treatment plan. She started to identify and define desired boundaries with family going forward. She learned about the need to utilize her social supports and make time for self-care.        Treatment Objective(s) Addressed in This Session:   Increase interest, engagement, and pleasure in doing things  Decrease frequency and intensity of feeling down, depressed, hopeless  talk to at least two others about losses and coping       Intervention:   CBT: behavioral chain analysis  Structural: balanced rules and boundaries        ASSESSMENT: Current Emotional / Mental Status (status of significant symptoms):   Risk status (Self / Other harm or suicidal ideation)   Client denies current fears or concerns for personal safety.   Client denies current or recent suicidal ideation or behaviors.   Client denies current or recent homicidal ideation or behaviors.   Client denies current or recent self injurious  behavior or ideation.   Client denies other safety concerns.   Client Client reports there has been no change in risk factors since their last session.     Client Client reports there has been no change in protective factors since their last session.     A safety and risk management plan has not been developed at this time, however client was given the after-hours number / 911 should there be a change in any of these risk factors.     Appearance:   Appropriate    Eye Contact:   Good    Psychomotor Behavior: Normal    Attitude:   Cooperative    Orientation:   All   Speech    Rate / Production: Normal     Volume:  Normal    Mood:    Depressed    Affect:    Appropriate    Thought Content:  Clear    Thought Form:  Coherent  Goal Directed  Logical    Insight:    Fair      Medication Review:   No changes to current psychiatric medication(s)     Medication Compliance:   Yes     Changes in Health Issues:   None reported     Chemical Use Review:   Substance Use: Chemical use reviewed, no active concerns identified      Tobacco Use: No current tobacco use.       Collateral Reports Completed:   Not Applicable    PLAN: (Client Tasks / Therapist Tasks / Other)  Client will schedule at least two self-care events over the next month. She will start setting boundaries and saying no.         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   WHODAS 2.0 (12 item) 29.17% on 11/15/2018    Referral / Collaboration:  Referral to another  professional/service is not indicated at this time.    Anticipated number of session or this episode of care: 6-8      MeasurableTreatment Goal(s) related to diagnosis / functional impairment(s)  Goal 1: Client's depression will remit as evidenced by a decrease in PHQ9 score by at least 4 points or below a 5, where symptoms occur fewer than half the days.    Get through the holidays. Getting my daughter through her surgery.      Objective #A (Client Action)   Client will decrease frequency and intensity of feeling down, depressed, hopeless  Client will increase self-awareness of symptom onset/escalation  Status: New - Date: TBD    Intervention(s)  Therapist will assign homework track symptoms, patterns and triggers  provide psycho-education on depression  Therapist will teach CBT strategies for attending to negative self-talk and cognitive distortions.  ACT: experiential exercises and mindfulness practices, how to live with life barriers    Objective #B  Client will Increase interest, engagement, and pleasure in doing things  Identify negative self-talk and behaviors: challenge core beliefs, myths, and actions  Status: New - Date: TBD    Intervention(s)  Therapist will assign homework to practice using coping skills outside the therapy encounter, worksheets to challenge negative thoughts  teach distraction skills. explore and tailor enjoyable activities, increase social activities, strengthen social supports  ACT: life values and being mindful of values for goals and daily living    Objective #C  Client will Improve quantity and quality of night time sleep / decrease daytime naps  Improve concentration, focus, and mindfulness in daily activities   Status: New - Date: TBD    Intervention(s)  Therapist will assign homework to track and improve sleep hygiene  provide safe space to address hx of presenting problem and root cause  teach emotional regulation skills. mindfulness practices, grounding skills, affirmations,  strengths based approach  [place]: exercise to stage presenting problem, reflect, process and problem solve.    Client has not reviewed nor agreed to the above plan.      LEXI John, LICSW  November 21, 2018

## 2018-11-23 ENCOUNTER — RADIANT APPOINTMENT (OUTPATIENT)
Dept: BONE DENSITY | Facility: CLINIC | Age: 83
End: 2018-11-23
Attending: INTERNAL MEDICINE
Payer: COMMERCIAL

## 2018-11-23 DIAGNOSIS — M85.80 OSTEOPENIA, UNSPECIFIED LOCATION: ICD-10-CM

## 2018-11-23 PROCEDURE — 77085 DXA BONE DENSITY AXL VRT FX: CPT | Performed by: INTERNAL MEDICINE

## 2018-12-02 DIAGNOSIS — E11.9 TYPE 2 DIABETES MELLITUS WITHOUT COMPLICATION, WITHOUT LONG-TERM CURRENT USE OF INSULIN (H): ICD-10-CM

## 2018-12-04 NOTE — TELEPHONE ENCOUNTER
Requested Prescriptions   Pending Prescriptions Disp Refills     glimepiride (AMARYL) 1 MG tablet [Pharmacy Med Name: GLIMEPIRIDE 1 MG TABLET]  Last Written Prescription Date:  10/25/2018  Last Fill Quantity: 90,  # refills: 2   Last office visit: 10/9/2018 with prescribing provider:     Future Office Visit:   Next 5 appointments (look out 90 days)     Dec 12, 2018  2:00 PM CST   Return Visit with LEXI John   [facility] ([facility])    [id number] [facility] [address] [phone]                90 tablet 2     Sig: TAKE 1 TABLET (1 MG) BY MOUTH EVERY MORNING (BEFORE BREAKFAST)    Sulfonylurea Agents Passed    12/2/2018 12:37 PM       Passed - Blood pressure less than 140/90 in past 6 months    BP Readings from Last 3 Encounters:   10/09/18 122/74   09/21/18 120/60   09/06/18 126/70                Passed - Patient has documented LDL within the past 12 mos.    Recent Labs   Lab Test  10/09/18   1229   LDL  105*            Passed - Patient has had a Microalbumin in the past 12 mos.    Recent Labs   Lab Test  10/09/18   1230   MICROL  26   UMALCR  22.63            Passed - Patient has documented A1c within the specified period of time.    If HgbA1C is 8 or greater, it needs to be on file within the past 3 months.  If less than 8, must be on file within the past 6 months.     Recent Labs   Lab Test  10/09/18   1229   A1C  6.4*            Passed - Patient is age 18 or older       Passed - No active pregnancy on record       Passed - Patient has a recent creatinine (normal) within the past 12 mos.    Recent Labs   Lab Test  05/01/18   1532   07/17/17   1525   CR  0.63   < >   --    CREAT   --    --   0.7    < > = values in this interval not displayed.            Passed - Patient has not had a positive pregnancy test within the past 12 mos.       Passed - Recent (6 mo) or future (30 days) visit within the authorizing provider's specialty    Patient had office visit in the  "last 6 months or has a visit in the next 30 days with authorizing provider or within the authorizing provider's specialty.  See \"Patient Info\" tab in inbasket, or \"Choose Columns\" in Meds & Orders section of the refill encounter.            "

## 2018-12-05 RX ORDER — GLIMEPIRIDE 1 MG/1
TABLET ORAL
Qty: 90 TABLET | Refills: 2 | OUTPATIENT
Start: 2018-12-05

## 2018-12-10 ENCOUNTER — TELEPHONE (OUTPATIENT)
Dept: PSYCHOLOGY | Facility: CLINIC | Age: 83
End: 2018-12-10

## 2018-12-10 ENCOUNTER — FCC EXTENDED DOCUMENTATION (OUTPATIENT)
Dept: PSYCHOLOGY | Facility: CLINIC | Age: 83
End: 2018-12-10

## 2018-12-10 DIAGNOSIS — F32.0 MAJOR DEPRESSIVE DISORDER, SINGLE EPISODE, MILD WITH ANXIOUS DISTRESS (H): Primary | ICD-10-CM

## 2018-12-10 NOTE — Clinical Note
Hi, Patient is happy with her progress reporting she is doing better. She asked to be discharged from therapy. She can return to counseling with [facility] in the future as needed.Please contact me if you have any questions. Rodolfo Lima MA, LMFT, LICSW

## 2018-12-10 NOTE — PROGRESS NOTES
Discharge Summary  Client not present.     Client Name: Norma F Alpers MRN#: [id number] YOB: 1934      Intake / Discharge Date: 11/15/2018         //         12/10/2018      DSM5 Diagnoses: (Sustained by DSM5 Criteria Listed Above)  Diagnoses: 296.21 (F32.0) Major Depressive Disorder, Single Episode, Mild With anxious distress  Psychosocial & Contextual Factors: Client is experiencing symptoms of depression as a result of difficult family changes with the death of her  and her daughter being diagnosed with cancer.           Presenting Concern:    - Sx of depression adjusting to life as a  and stress of her daughter's cancer              - feeling lonely from 's loss.       Reason for Discharge:  Client is satisfied with progress      Disposition at Time of Last Encounter:   Comments:   Client was in the preparation to action stage of treatment and coping with grief and loss of her . She called to update that she was doing well and to discharge.      Risk Management:   Client denies a history of suicidal ideation, suicide attempts, self-injurious behavior, homicidal ideation, homicidal behavior and and other safety concerns  A safety and risk management plan has not been developed at this time, however client was given the after-hours number / 911 should there be a change in any of these risk factors.      Referred To:  Client may resume counseling services at any time in the future by calling the [facility] Intake Office, [phone].        LEXI John   12/10/2018

## 2018-12-11 DIAGNOSIS — E11.9 TYPE 2 DIABETES MELLITUS WITHOUT COMPLICATION, WITHOUT LONG-TERM CURRENT USE OF INSULIN (H): ICD-10-CM

## 2018-12-15 RX ORDER — GLIMEPIRIDE 1 MG/1
TABLET ORAL
Qty: 90 TABLET | Refills: 2 | Status: SHIPPED | OUTPATIENT
Start: 2018-12-15 | End: 2019-03-04

## 2018-12-15 NOTE — TELEPHONE ENCOUNTER
Last refill 10/25 #90 with 2 refills. Call to [facility]. They have no prescription available. Prescription resent. Call to patient. Left detailed message.

## 2019-01-03 DIAGNOSIS — I10 BENIGN ESSENTIAL HYPERTENSION: ICD-10-CM

## 2019-01-03 DIAGNOSIS — G47.00 INSOMNIA, UNSPECIFIED TYPE: ICD-10-CM

## 2019-01-03 NOTE — TELEPHONE ENCOUNTER
"Requested Prescriptions   Pending Prescriptions Disp Refills     lisinopril-hydrochlorothiazide (PRINZIDE/ZESTORETIC) 10-12.5 MG tablet [Pharmacy Med Name: LISINOPRIL-HCTZ 10-12.5 MG TAB] 90 tablet 3    Last Written Prescription Date:  10/21/2017  Last Fill Quantity: 90,  # refills: 3   Last office visit: 10/9/2018 with prescribing provider:     Future Office Visit:   Sig: TAKE 1 TABLET BY MOUTH DAILY    Diuretics (Including Combos) Protocol Passed - 1/3/2019 11:18 AM       Passed - Blood pressure under 140/90 in past 12 months    BP Readings from Last 3 Encounters:   10/09/18 122/74   09/21/18 120/60   09/06/18 126/70                Passed - Recent (12 mo) or future (30 days) visit within the authorizing provider's specialty    Patient had office visit in the last 12 months or has a visit in the next 30 days with authorizing provider or within the authorizing provider's specialty.  See \"Patient Info\" tab in inbasket, or \"Choose Columns\" in Meds & Orders section of the refill encounter.             Passed - Patient is age 18 or older       Passed - No active pregancy on record       Passed - Normal serum creatinine on file in past 12 months    Recent Labs   Lab Test 05/01/18  1532   CR 0.63             Passed - Normal serum potassium on file in past 12 months    Recent Labs   Lab Test 05/01/18  1532   POTASSIUM 4.1                   Passed - Normal serum sodium on file in past 12 months    Recent Labs   Lab Test 05/01/18  1532                Passed - No positive pregnancy test in past 12 months        "

## 2019-01-03 NOTE — TELEPHONE ENCOUNTER
"Requested Prescriptions   Pending Prescriptions Disp Refills     traZODone (DESYREL) 150 MG tablet [Pharmacy Med Name: TRAZODONE 150 MG TABLET] 90 tablet 1    Last Written Prescription Date:  07/27/2018  Last Fill Quantity: 90,  # refills: 1   Last office visit: 10/09/2018 with prescribing provider:     Future Office Visit:   Sig: TAKE 1 TABLET (150 MG) BY MOUTH AT BEDTIME    Serotonin Modulators Passed - 1/3/2019 11:18 AM       Passed - Recent (12 mo) or future (30 days) visit within the authorizing provider's specialty    Patient had office visit in the last 12 months or has a visit in the next 30 days with authorizing provider or within the authorizing provider's specialty.  See \"Patient Info\" tab in inbasket, or \"Choose Columns\" in Meds & Orders section of the refill encounter.             Passed - Patient is age 18 or older       Passed - No active pregnancy on record       Passed - No positive pregnancy test in past 12 months        "

## 2019-01-06 RX ORDER — TRAZODONE HYDROCHLORIDE 150 MG/1
TABLET ORAL
Qty: 90 TABLET | Refills: 1 | Status: SHIPPED | OUTPATIENT
Start: 2019-01-06 | End: 2019-07-18

## 2019-01-06 RX ORDER — LISINOPRIL/HYDROCHLOROTHIAZIDE 10-12.5 MG
1 TABLET ORAL DAILY
Qty: 90 TABLET | Refills: 0 | Status: SHIPPED | OUTPATIENT
Start: 2019-01-06 | End: 2019-03-04

## 2019-01-06 NOTE — TELEPHONE ENCOUNTER
Prescription approved per [facility] Refill Protocol.    Warnings Override History for lisinopril-hydrochlorothiazide (PRINZIDE/ZESTORETIC) 10-12.5 MG per tablet [[id number]]     Overridden by Adelita Kennedy MD on Oct 21, 2017 10:24 AM   Allergy/Contraindication   1. HYDRALAZINE-HCTZ [Level: Ingredient Match] [Reason: Insignificant]       JOHNATHAN HenaoN, RN

## 2019-01-06 NOTE — TELEPHONE ENCOUNTER
Prescription approved per [facility] Refill Protocol.  OBIE Maldonado, BSN, RN

## 2019-01-10 ENCOUNTER — TRANSFERRED RECORDS (OUTPATIENT)
Dept: HEALTH INFORMATION MANAGEMENT | Facility: CLINIC | Age: 84
End: 2019-01-10

## 2019-02-19 DIAGNOSIS — E03.9 ACQUIRED HYPOTHYROIDISM: ICD-10-CM

## 2019-02-19 DIAGNOSIS — M85.80 OSTEOPENIA, UNSPECIFIED LOCATION: ICD-10-CM

## 2019-02-19 NOTE — TELEPHONE ENCOUNTER
"Requested Prescriptions   Pending Prescriptions Disp Refills     levothyroxine (SYNTHROID/LEVOTHROID) 75 MCG tablet  Last Written Prescription Date:  09/21/18  Last Fill Quantity: 90,  # refills: 1   Last office visit: No previous visit found with prescribing provider:  10/09/18   Future Office Visit:   Next 5 appointments (look out 90 days)    Mar 04, 2019 10:20 AM CST  PHYSICAL with Anika Ibrahim MD  [facility] ([facility]) [address]  [facility] [address]  [phone]          90 tablet 1     Sig: Take 1 tablet (75 mcg) by mouth every morning    Thyroid Protocol Failed - 2/19/2019  1:15 PM       Failed - Normal TSH on file in past 12 months    Recent Labs   Lab Test 10/12/17  1008   TSH 3.35             Passed - Patient is 12 years or older       Passed - Recent (12 mo) or future (30 days) visit within the authorizing provider's specialty    Patient had office visit in the last 12 months or has a visit in the next 30 days with authorizing provider or within the authorizing provider's specialty.  See \"Patient Info\" tab in inbasket, or \"Choose Columns\" in Meds & Orders section of the refill encounter.             Passed - Medication is active on med list       Passed - No active pregnancy on record    If patient is pregnant or has had a positive pregnancy test, please check TSH.         Passed - No positive pregnancy test in past 12 months    If patient is pregnant or has had a positive pregnancy test, please check TSH.            "

## 2019-02-20 RX ORDER — RALOXIFENE HYDROCHLORIDE 60 MG/1
TABLET, FILM COATED ORAL
Qty: 90 TABLET | Refills: 0 | Status: SHIPPED | OUTPATIENT
Start: 2019-02-20 | End: 2019-03-04

## 2019-02-20 RX ORDER — LEVOTHYROXINE SODIUM 75 UG/1
75 TABLET ORAL EVERY MORNING
Qty: 90 TABLET | Refills: 1 | Status: SHIPPED | OUTPATIENT
Start: 2019-02-20 | End: 2019-07-11

## 2019-02-20 NOTE — TELEPHONE ENCOUNTER
Routing refill request to provider for review/approval because:  Labs not current:  TSH    Next 5 appointments (look out 90 days)    Mar 04, 2019 10:20 AM CST  PHYSICAL with Anika Ibrahim MD  [facility] ([facility] [address]  [facility] [id number] [phone]        Last office visit: 10/09/18

## 2019-02-22 ENCOUNTER — HOSPITAL ENCOUNTER (OUTPATIENT)
Dept: OCCUPATIONAL THERAPY | Facility: CLINIC | Age: 84
Setting detail: THERAPIES SERIES
End: 2019-02-22
Attending: OPHTHALMOLOGY
Payer: COMMERCIAL

## 2019-02-22 PROCEDURE — 97166 OT EVAL MOD COMPLEX 45 MIN: CPT | Mod: GO | Performed by: OCCUPATIONAL THERAPIST

## 2019-02-22 PROCEDURE — 97535 SELF CARE MNGMENT TRAINING: CPT | Mod: GO | Performed by: OCCUPATIONAL THERAPIST

## 2019-02-22 ASSESSMENT — VISUAL ACUITY
OD: 20/200
OS: 20/40

## 2019-02-22 ASSESSMENT — ACTIVITIES OF DAILY LIVING (ADL): PRIOR_ADL/IADL_STATUS: INDEPENDENT PRIOR TO ONSET

## 2019-02-22 NOTE — PROGRESS NOTES
02/22/19 1000   Visit Type   Type of Visit Initial   General Information   Start Of Care Date 02/22/19   Referring Physician Dr. Kwaku Parish   Orders Evaluate And Treat As Indicated  (help with reading)   Orders Comment OT Order states: Train in visual skills and compensatory techniques for activities of daily living.  Recommend and train in the use of optical/non-optical devices.   Date of Order 02/15/19   Medical Diagnosis AMD - wet OD, dry OS, pseudophakia   Onset Of Illness/injury Or Date Of Surgery 2/15/19  (referral date)   Surgical/Medical history reviewed Yes   Additional Occupational Profile Info/Pertinent History of Current Problem Past medical history includes hypertension, GERD, stroke with aphasia initially-resolved in 2012, anxiety, osteopenia, thyroid nodule, type 2 diabetes, irritable bowel syndrome, major depressive disorder with single episode-in partial remission.   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  Had one recently, she thinks it was 3 days ago, but not sure.  Has discomfort for a few days after her shot; had someone drive her here today.   Social History/Home Environment   Living Environment Apartment/condo  ([address]   Current Community Support Lives alone;Family/friend caregiver  (friends primarily)   Patient Role/employment History  Retired  (teacher)   Social/Environment Comment  x3 years, has a son [place] [facility] - travels a lot; trying to visit and assist her daugthter on [place] pr[place] (she is single parent and dealing with cancer, has 3 teenagers); another son in [place].  Patient currently doing all of her own cooking, cleaning, laundry, shopping, finances, setting up medications and she drives.  On  occasion - has help for cleaning bathrooms.  could go to the cafeteria in the assisted living portion of her complex for meals if she wanted.   Pain Assessment   Comments slight pain in shoulder, stiff neck - likely slept wrong per her report, not of concern to patient   Fall Risk Screen   Have you fallen 2 or more times in the past year? No   Have you fallen and had an injury in the past year? No   Is patient a fall risk? (to be assessed - slightly unsteady x2 with ambulation)   Cognitive/Behavioral   Communication Intact   Cognitive Status Intact for evaluation process  (Does admit her short-term memory has declined)   Behavior Appropriate   Patient/family aware of diagnosis Yes   How well do you understand your eye condition? (partially)   Adjustment to disability Good   Physical Status/Equipment   Physical Status Impaired balance   Mobility equipment used (Has a cane at home, but generally does not use)   Visual Report   Functional Complaints Reading;Writing;Homemaking;Safety in mobility   Visual Complaints Scotoma Hindrance right eye;Scotoma Hindrance left eye;Visual fatigue;Difficulty maintaining focus;Light sensitivity   Magnifier (strength and type) hand held - reports one has a light   Reading glasses Bifocal   Technology (cell phone - smart phone)   Equipment comments no computer   Lighting and Glare   Is your lighting adequate? (doesn't live on side where get a lot of sun; has lamps)   Is glare a problem? Yes/ indoors;Yes/ outdoors   Are you satisfied with your sunglasses? Yes  (but not sure)   Sunglass filter color Nan  (fit over, but doesn't fit great over glasses)   Visual Acuity   Acuity right eye 20/200   Acuity left eye 20/40   Contrast Sensitivity   Contrast sensitivity (score/25) 19/25   Preferred Retinal Locus   Right eye (clock face: missing hands of clock and also 10 & 11 (upper L)   Right eye eccentric viewing position (not assessed)   Left eye (not missing any on clock face)   [place] Read    Smallest print size read 1.3M ambient light, .8M with task light   Critical print size 2M ambient light, 1.3M with task light   Words per minute at critical print size 133wpm ambient light, 150 task light; preferred print size: 3.2M - 4M   SRAFVP SCORING   # relevant measures 37   Composite score 54   Percentage of disability (%) 27.03   Clinical Impression, OT Eval   Criteria for Skilled Therapeutic Interventions Met yes   Therapy  Diagnosis: Impaired ADL/IADL with deficits in Reading based ADL;Written communication;Home management;Financial management;Dressing;Cognitive function   Impairment comments Decreased visual skills, decreased short-term memory   Assessment of Occupational Performance 3-5 Performance Deficits   Identified Performance Deficits Decreased independence with reading based ADL (medication bottles, food packages, recipes, newspaper, magazines, etc.), decreased ability to complete meal prep, decreased ability to complete written communication, decreased visual skills for functional mobility   Clinical Decision Making (Complexity) Moderate complexity   OT Visual Rehabilitation Evaluation Plan   Therapy Plan Occupational therapy intervention   Planned Interventions Scotoma awareness;Visual skills training for near tasks;Visual skills training for safety in mobility;Low vision compensatory training for reading;Low vision compensatory training for written communication;Low vision compensatory trainging for financial management;Low vision compensatory training for object identification;Instruction in environmental adaptations for glare;Instruction in environmental adaptations for contrast;Instruction in environmental adaptations for lighting;Optical device/ADL device instruction and training;Cognitive retraining / compensations   Frequency / Duration 1x/week, decreasing frequency as indicated times 12 weeks   Risks and Benefits of Treatment have been explained. Yes   Patient, Family in agreement with  plan of care Yes   GOALS   Goals Near Vision;Environmental Modification;Resource Education;Cognition   Goal 1 - Near Vision   Near Vision Goal Comment Patient will demonstrate 4 pieces of adaptive equipment and/or adaptive techniques in regards to lighting, contrast and glare for increased ADL/IADL independence (reading, meal prep, medication management, writing).   Target Date 05/17/19   Goal 3 - Environmental modification   Environmental Modification Goal Comment Patient will verbalize awareness of visual field Loss and demonstrate use of 2 new pieces of adaptive equipment and or adapted techniques to improve use of visual skills/visability for increased independence in reading-based activities of daily living, written communication and detail ADL tasks.   Target Date 05/17/19   Goal 4 - Resource education   Goal Description: Resource education Patient will verbalize awareness of community resources for the following:;Audio access to print materials;Access to large print materials;Access to low vision devices;Transportation alternatives   Resource Education Goal Comment she will verbalize 3 resources   Target Date 05/17/19   Goal 6 - Cognition   Goal Description: Cognition Patient will identify 3 strategies for compensating for cognitive impairment to improve independence in managing personal and household information   Target Date 05/17/19   Total Evaluation Time   OT [place], Moderate Complexity Minutes (51372) 47   Therapy Certification   Certification date from 02/22/19   Certification date to 05/17/19   Medical Diagnosis AMD   Certification I certify the need for these services furnished under this plan of treatment and while under my care.  (Physician co-signature of this document indicates review and certification of the therapy plan).

## 2019-03-04 ENCOUNTER — OFFICE VISIT (OUTPATIENT)
Dept: INTERNAL MEDICINE | Facility: CLINIC | Age: 84
End: 2019-03-04
Payer: COMMERCIAL

## 2019-03-04 VITALS
WEIGHT: 181 LBS | OXYGEN SATURATION: 97 % | SYSTOLIC BLOOD PRESSURE: 120 MMHG | HEIGHT: 65 IN | TEMPERATURE: 98 F | BODY MASS INDEX: 30.16 KG/M2 | DIASTOLIC BLOOD PRESSURE: 76 MMHG | RESPIRATION RATE: 12 BRPM | HEART RATE: 93 BPM

## 2019-03-04 DIAGNOSIS — E11.9 TYPE 2 DIABETES MELLITUS WITHOUT COMPLICATION, WITHOUT LONG-TERM CURRENT USE OF INSULIN (H): ICD-10-CM

## 2019-03-04 DIAGNOSIS — E78.5 HYPERLIPIDEMIA LDL GOAL <100: ICD-10-CM

## 2019-03-04 DIAGNOSIS — R41.3 MEMORY CHANGE: ICD-10-CM

## 2019-03-04 DIAGNOSIS — I10 ESSENTIAL HYPERTENSION: ICD-10-CM

## 2019-03-04 DIAGNOSIS — M85.80 OSTEOPENIA, UNSPECIFIED LOCATION: ICD-10-CM

## 2019-03-04 DIAGNOSIS — I10 BENIGN ESSENTIAL HYPERTENSION: ICD-10-CM

## 2019-03-04 DIAGNOSIS — F41.9 ANXIETY: ICD-10-CM

## 2019-03-04 DIAGNOSIS — Z00.00 ROUTINE GENERAL MEDICAL EXAMINATION AT A HEALTH CARE FACILITY: Primary | ICD-10-CM

## 2019-03-04 DIAGNOSIS — N64.4 BREAST PAIN, LEFT: ICD-10-CM

## 2019-03-04 LAB
BASOPHILS # BLD AUTO: 0 10E9/L (ref 0–0.2)
BASOPHILS NFR BLD AUTO: 0.2 %
DIFFERENTIAL METHOD BLD: ABNORMAL
EOSINOPHIL # BLD AUTO: 0.2 10E9/L (ref 0–0.7)
EOSINOPHIL NFR BLD AUTO: 1.8 %
ERYTHROCYTE [DISTWIDTH] IN BLOOD BY AUTOMATED COUNT: 15.9 % (ref 10–15)
HBA1C MFR BLD: 6.5 % (ref 0–5.6)
HCT VFR BLD AUTO: 44.1 % (ref 35–47)
HGB BLD-MCNC: 14.1 G/DL (ref 11.7–15.7)
LYMPHOCYTES # BLD AUTO: 2.3 10E9/L (ref 0.8–5.3)
LYMPHOCYTES NFR BLD AUTO: 25.8 %
MCH RBC QN AUTO: 29 PG (ref 26.5–33)
MCHC RBC AUTO-ENTMCNC: 32 G/DL (ref 31.5–36.5)
MCV RBC AUTO: 91 FL (ref 78–100)
MONOCYTES # BLD AUTO: 0.6 10E9/L (ref 0–1.3)
MONOCYTES NFR BLD AUTO: 6.2 %
NEUTROPHILS # BLD AUTO: 5.9 10E9/L (ref 1.6–8.3)
NEUTROPHILS NFR BLD AUTO: 66 %
PLATELET # BLD AUTO: 230 10E9/L (ref 150–450)
RBC # BLD AUTO: 4.86 10E12/L (ref 3.8–5.2)
VIT B12 SERPL-MCNC: 759 PG/ML (ref 193–986)
WBC # BLD AUTO: 9 10E9/L (ref 4–11)

## 2019-03-04 PROCEDURE — 99397 PER PM REEVAL EST PAT 65+ YR: CPT | Performed by: INTERNAL MEDICINE

## 2019-03-04 PROCEDURE — 82607 VITAMIN B-12: CPT | Performed by: INTERNAL MEDICINE

## 2019-03-04 PROCEDURE — 80053 COMPREHEN METABOLIC PANEL: CPT | Performed by: INTERNAL MEDICINE

## 2019-03-04 PROCEDURE — 83036 HEMOGLOBIN GLYCOSYLATED A1C: CPT | Performed by: INTERNAL MEDICINE

## 2019-03-04 PROCEDURE — 36415 COLL VENOUS BLD VENIPUNCTURE: CPT | Performed by: INTERNAL MEDICINE

## 2019-03-04 PROCEDURE — 84443 ASSAY THYROID STIM HORMONE: CPT | Performed by: INTERNAL MEDICINE

## 2019-03-04 PROCEDURE — 85025 COMPLETE CBC W/AUTO DIFF WBC: CPT | Performed by: INTERNAL MEDICINE

## 2019-03-04 RX ORDER — LISINOPRIL/HYDROCHLOROTHIAZIDE 10-12.5 MG
1 TABLET ORAL DAILY
Qty: 90 TABLET | Refills: 4 | Status: SHIPPED | OUTPATIENT
Start: 2019-03-04 | End: 2019-12-02

## 2019-03-04 RX ORDER — GLIMEPIRIDE 1 MG/1
TABLET ORAL
Qty: 90 TABLET | Refills: 2 | Status: SHIPPED | OUTPATIENT
Start: 2019-03-04 | End: 2019-12-12

## 2019-03-04 RX ORDER — PRAVASTATIN SODIUM 80 MG/1
TABLET ORAL
Qty: 90 TABLET | Refills: 4 | Status: SHIPPED | OUTPATIENT
Start: 2019-03-04 | End: 2020-03-05

## 2019-03-04 RX ORDER — RALOXIFENE HYDROCHLORIDE 60 MG/1
TABLET, FILM COATED ORAL
Qty: 90 TABLET | Refills: 4 | Status: SHIPPED | OUTPATIENT
Start: 2019-03-04 | End: 2020-04-20

## 2019-03-04 RX ORDER — POTASSIUM CHLORIDE 1500 MG/1
TABLET, EXTENDED RELEASE ORAL
Qty: 180 TABLET | Refills: 4 | Status: SHIPPED | OUTPATIENT
Start: 2019-03-04 | End: 2020-04-20

## 2019-03-04 RX ORDER — SERTRALINE HYDROCHLORIDE 100 MG/1
100 TABLET, FILM COATED ORAL DAILY
Qty: 90 TABLET | Refills: 1 | Status: SHIPPED | OUTPATIENT
Start: 2019-03-04 | End: 2019-09-22

## 2019-03-04 ASSESSMENT — PATIENT HEALTH QUESTIONNAIRE - PHQ9: SUM OF ALL RESPONSES TO PHQ QUESTIONS 1-9: 10

## 2019-03-04 ASSESSMENT — ACTIVITIES OF DAILY LIVING (ADL): CURRENT_FUNCTION: NO ASSISTANCE NEEDED

## 2019-03-04 ASSESSMENT — MIFFLIN-ST. JEOR: SCORE: 1263.95

## 2019-03-04 NOTE — PATIENT INSTRUCTIONS
10 minutes of exercise at a time    Preventive Health Recommendations    See your health care provider every year to    Review health changes.     Discuss preventive care.      Review your medicines if your doctor has prescribed any.      You no longer need a yearly Pap test unless you've had an abnormal Pap test in the past 10 years. If you have vaginal symptoms, such as bleeding or discharge, be sure to talk with your provider about a Pap test.      Every 1 to 2 years, have a mammogram.  If you are over 69, talk with your health care provider about whether or not you want to continue having screening mammograms.      Every 10 years, have a colonoscopy. Or, have a yearly FIT test (stool test). These exams will check for colon cancer.       Have a cholesterol test every 5 years, or more often if your doctor advises it.       Have a diabetes test (fasting glucose) every three years. If you are at risk for diabetes, you should have this test more often.       At age 65, have a bone density scan (DEXA) to check for osteoporosis (brittle bone disease).    Shots:    Get a flu shot each year.    Get a tetanus shot every 10 years.    Talk to your doctor about your pneumonia vaccines. There are now two you should receive - Pneumovax (PPSV 23) and Prevnar (PCV 13).    Talk to your pharmacist about the shingles vaccine.    Talk to your doctor about the hepatitis B vaccine.    Nutrition:     Eat at least 5 servings of fruits and vegetables each day.      Eat whole-grain bread, whole-wheat pasta and brown rice instead of white grains and rice.      Get adequate Calcium and Vitamin D.     Lifestyle    Exercise at least 150 minutes a week (30 minutes a day, 5 days a week). This will help you control your weight and prevent disease.      Limit alcohol to one drink per day.      No smoking.       Wear sunscreen to prevent skin cancer.       See your dentist twice a year for an exam and cleaning.      See your eye doctor every 1 to  2 years to screen for conditions such as glaucoma, macular degeneration and cataracts.    Personalized Prevention Plan  You are due for the preventive services outlined below.  Your care team is available to assist you in scheduling these services.  If you have already completed any of these items, please share that information with your care team to update in your medical record.  Health Maintenance Due   Topic Date Due     Annual Wellness Visit  08/09/2017

## 2019-03-04 NOTE — LETTER
March 6, 2019      Norma F Alpers  [address]   [facility] [address]        Dear Ms.Alpers,    We are writing to inform you of your test results.    The electrolytes are normal.  The kidney function (GFR) is normal.  The liver function tests (AST and ALT) are within normal limits.   The thyroid test (TSH) is within normal limits.  The B12 is within normal limits.  The hemoglobin A1c is at goal of less than 7.0%.  Good work!  The blood counts are within normal limits.     Resulted Orders   TSH with free T4 reflex   Result Value Ref Range    TSH 1.17 0.40 - 4.00 mU/L   Comprehensive metabolic panel   Result Value Ref Range    Sodium 139 133 - 144 mmol/L    Potassium 4.3 3.4 - 5.3 mmol/L    Chloride 107 94 - 109 mmol/L    Carbon Dioxide 26 20 - 32 mmol/L    Anion Gap 6 3 - 14 mmol/L    Glucose 96 70 - 99 mg/dL      Comment:      Fasting specimen    Urea Nitrogen 14 7 - 30 mg/dL    Creatinine 0.65 0.52 - 1.04 mg/dL    GFR Estimate 81 >60 mL/min/[1.73_m2]      Comment:      Non  GFR Calc  Starting 12/18/2018, serum creatinine based estimated GFR (eGFR) will be   calculated using the Chronic Kidney Disease Epidemiology Collaboration   (CKD-EPI) equation.      GFR Estimate If Black >90 >60 mL/min/[1.73_m2]      Comment:       GFR Calc  Starting 12/18/2018, serum creatinine based estimated GFR (eGFR) will be   calculated using the Chronic Kidney Disease Epidemiology Collaboration   (CKD-EPI) equation.      Calcium 9.4 8.5 - 10.1 mg/dL    Bilirubin Total 0.3 0.2 - 1.3 mg/dL    Albumin 3.9 3.4 - 5.0 g/dL    Protein Total 7.8 6.8 - 8.8 g/dL    Alkaline Phosphatase 65 40 - 150 U/L    ALT 24 0 - 50 U/L    AST 23 0 - 45 U/L   CBC with platelets differential   Result Value Ref Range    WBC 9.0 4.0 - 11.0 10e9/L    RBC Count 4.86 3.8 - 5.2 10e12/L    Hemoglobin 14.1 11.7 - 15.7 g/dL    Hematocrit 44.1 35.0 - 47.0 %    MCV 91 78 - 100 fl    MCH 29.0 26.5 - 33.0 pg    MCHC 32.0  31.5 - 36.5 g/dL    RDW 15.9 (H) 10.0 - 15.0 %      Comment:      Results confirmed by repeat test    Platelet Count 230 150 - 450 10e9/L    % Neutrophils 66.0 %    % Lymphocytes 25.8 %    % Monocytes 6.2 %    % Eosinophils 1.8 %    % Basophils 0.2 %    Absolute Neutrophil 5.9 1.6 - 8.3 10e9/L    Absolute Lymphocytes 2.3 0.8 - 5.3 10e9/L    Absolute Monocytes 0.6 0.0 - 1.3 10e9/L    Absolute Eosinophils 0.2 0.0 - 0.7 10e9/L    Absolute Basophils 0.0 0.0 - 0.2 10e9/L    Diff Method Automated Method    Hemoglobin A1c   Result Value Ref Range    Hemoglobin A1C 6.5 (H) 0 - 5.6 %      Comment:      Normal <5.7% Prediabetes 5.7-6.4%  Diabetes 6.5% or higher - adopted from ADA   consensus guidelines.     Vitamin B12   Result Value Ref Range    Vitamin B12 759 193 - 986 pg/mL       If you have any questions or concerns, please call the clinic at the number listed above.       Sincerely,        Anika Ibrahim MD

## 2019-03-04 NOTE — PROGRESS NOTES
"SUBJECTIVE:   Norma F Alpers is a 84 year old female who presents for Preventive Visit.    Are you in the first 12 months of your Medicare coverage?  No    Annual Wellness Visit     In general, how would you rate your overall health?  Good    Frequency of exercise:  None    Do you usually eat at least 4 servings of fruit and vegetables a day, include whole grains    & fiber and avoid regularly eating high fat or \"junk\" foods?  Yes    Taking medications regularly:  Yes    Medication side effects:  Not applicable    Ability to successfully perform activities of daily living:  No assistance needed    Home Safety:  No safety concerns identified    Hearing Impairment:  No hearing concerns    In the past 6 months, have you been bothered by leaking of urine?  No   In general, how would you rate your overall mental or emotional health?  Good    PHQ-2 Total Score:    Additional concerns today:  No  Yes to leaking of urine  Do you feel safe in your environment? Yes    Do you have a Health Care Directive? Yes: Advance Directive has been received and scanned.      Fall risk  Fallen 2 or more times in the past year?: No  Any fall with injury in the past year?: No    Cognitive Screening   1) Repeat 3 items (Leader, Season, Table)    2) Clock draw: NORMAL  3) 3 item recall: Recalls 3 objects  Results: NORMAL clock, 3 items recalled    Mini-CogTM Copyright S Saida. Licensed by the author for use in [facility]; reprinted with permission (loretta@.[facility]). All rights reserved.      Do you have sleep apnea, excessive snoring or daytime drowsiness?: no    Reviewed and updated as needed this visit by clinical staff  Tobacco  Allergies  Meds  Problems  Med Hx  Surg Hx  Fam Hx  Soc Hx          Reviewed and updated as needed this visit by Provider  Allergies  Problems        Social History     Tobacco Use     Smoking status: Former Smoker     Last attempt to quit: 3/27/1975     Years since quittin.9     Smokeless " "tobacco: Never Used   Substance Use Topics     Alcohol use: Yes     Comment: occasionally       No flowsheet data found.      PROBLEMS TO ADD ON...    Current providers sharing in care for this patient include:   Patient Care Team:  Adelita Kennedy MD as PCP - General (Internal Medicine)  Adelita Kennedy MD as PCP - Assigned PCP    The following health maintenance items are reviewed in Epic and correct as of today:  Health Maintenance   Topic Date Due     MEDICARE ANNUAL WELLNESS VISIT  08/09/2017     A1C Q6 MO  04/09/2019     FOOT EXAM Q1 YEAR  04/24/2019     CREATININE Q1 YEAR  05/01/2019     FALL RISK ASSESSMENT  05/01/2019     PHQ-9 Q6 MONTHS  05/15/2019     EYE EXAM Q1 YEAR  10/01/2019     LIPID MONITORING Q1 YEAR  10/09/2019     MICROALBUMIN Q1 YEAR  10/09/2019     TSH W/ FREE T4 REFLEX Q2 YEAR  10/12/2019     JENNY QUESTIONNAIRE 1 YEAR  11/15/2019     DTAP/TDAP/TD IMMUNIZATION (2 - Td) 06/20/2022     ADVANCE DIRECTIVE PLANNING Q5 YRS  05/06/2023     DEXA SCAN SCREENING (SYSTEM ASSIGNED)  Completed     DEPRESSION ACTION PLAN  Completed     INFLUENZA VACCINE  Completed     PNEUMOCOCCAL IMMUNIZATION 65+ LOW/MEDIUM RISK  Completed     ZOSTER IMMUNIZATION  Completed     IPV IMMUNIZATION  Aged Out     MENINGITIS IMMUNIZATION  Aged Out     BP Readings from Last 3 Encounters:   03/04/19 120/76   10/09/18 122/74   09/21/18 120/60    Wt Readings from Last 3 Encounters:   03/04/19 82.1 kg (181 lb)   10/09/18 79.8 kg (176 lb)   09/21/18 81.4 kg (179 lb 8 oz)                      Review of Systems  Constitutional, HEENT, cardiovascular, pulmonary, GI, , musculoskeletal, neuro, skin, endocrine and psych systems are negative, except as otherwise noted.    OBJECTIVE:   /76   Pulse 93   Temp 98  F (36.7  C) (Oral)   Resp 12   Ht 1.638 m (5' 4.5\")   Wt 82.1 kg (181 lb)   SpO2 97%   BMI 30.59 kg/m   Estimated body mass index is 30.59 kg/m  as calculated from the following:    Height as of this encounter: 1.638 m (5' " "4.5\").    Weight as of this encounter: 82.1 kg (181 lb).  Physical Exam  GENERAL APPEARANCE: healthy, alert and no distress  EYES: Eyes grossly normal to inspection, PERRL and conjunctivae and sclerae normal  HENT: ear canals and TM's normal, nose and mouth without ulcers or lesions, oropharynx clear and oral mucous membranes moist  NECK: no adenopathy, no asymmetry, masses, or scars and thyroid normal to palpation  RESP: lungs clear to auscultation - no rales, rhonchi or wheezes  BREAST: normal without masses, tenderness or nipple discharge and no palpable axillary masses or adenopathy of the right; tenderness upon palpation of left breast at 1 o'clock without any lumps appreciated  CV: regular rate and rhythm, normal S1 S2, no S3 or S4, no murmur, click or rub, no peripheral edema and peripheral pulses strong  ABDOMEN: soft, nontender, no hepatosplenomegaly, no masses and bowel sounds normal  MS: no musculoskeletal defects are noted and gait is age appropriate without ataxia  SKIN: no suspicious lesions or rashes  NEURO: Normal strength and tone, sensory exam grossly normal, mentation intact and speech normal  PSYCH: mentation appears normal and affect normal/bright        ASSESSMENT / PLAN:       ICD-10-CM    1. Routine general medical examination at a health care facility Z00.00    2. Anxiety F41.9 sertraline (ZOLOFT) 100 MG tablet     TSH with free T4 reflex   3. Osteopenia, unspecified location M85.80 raloxifene (EVISTA) 60 MG tablet     TSH with free T4 reflex   4. Hyperlipidemia LDL goal <100 E78.5 pravastatin (PRAVACHOL) 80 MG tablet     Comprehensive metabolic panel   5. Benign essential hypertension I10 lisinopril-hydrochlorothiazide (PRINZIDE/ZESTORETIC) 10-12.5 MG tablet   6. Essential hypertension I10 potassium chloride ER (KLOR-CON) 20 MEQ CR tablet     Comprehensive metabolic panel   7. Type 2 diabetes mellitus without complication, without long-term current use of insulin (H) E11.9 glimepiride " "(AMARYL) 1 MG tablet     TSH with free T4 reflex     Comprehensive metabolic panel     CBC with platelets differential     Hemoglobin A1c   8. Memory change R41.3 Vitamin B12   9. Breast pain, left N64.4 US Breast Left Limited 1-3 Quadrants     MA Diagnostic Digital Bilateral       End of Life Planning:  Patient currently has an advanced directive: Yes.  Practitioner is supportive of decision.    COUNSELING:  Reviewed preventive health counseling, as reflected in patient instructions       Regular exercise       Healthy diet/nutrition    BP Readings from Last 1 Encounters:   03/04/19 120/76     Estimated body mass index is 30.59 kg/m  as calculated from the following:    Height as of this encounter: 1.638 m (5' 4.5\").    Weight as of this encounter: 82.1 kg (181 lb).           reports that she quit smoking about 43 years ago. she has never used smokeless tobacco.      Appropriate preventive services were discussed with this patient, including applicable screening as appropriate for cardiovascular disease, diabetes, osteopenia/osteoporosis, and glaucoma.  As appropriate for age/gender, discussed screening for colorectal cancer, prostate cancer, breast cancer, and cervical cancer. Checklist reviewing preventive services available has been given to the patient.    Reviewed patients plan of care and provided an AVS. The Basic Care Plan (routine screening as documented in Health Maintenance) for Lata meets the Care Plan requirement. This Care Plan has been established and reviewed with the Patient.    Counseling Resources:  ATP IV Guidelines  Pooled Cohorts Equation Calculator  Breast Cancer Risk Calculator  FRAX Risk Assessment  ICSI Preventive Guidelines  Dietary Guidelines for Americans, 2010  USDA's MyPlate  ASA Prophylaxis  Lung CA Screening    Anika Ibrahim MD  [facility]  "

## 2019-03-05 ENCOUNTER — HOSPITAL ENCOUNTER (OUTPATIENT)
Dept: MAMMOGRAPHY | Facility: CLINIC | Age: 84
Discharge: HOME | End: 2019-03-05
Attending: INTERNAL MEDICINE | Admitting: INTERNAL MEDICINE
Payer: COMMERCIAL

## 2019-03-05 ENCOUNTER — HOSPITAL ENCOUNTER (OUTPATIENT)
Dept: ULTRASOUND IMAGING | Facility: CLINIC | Age: 84
End: 2019-03-05
Attending: INTERNAL MEDICINE
Payer: COMMERCIAL

## 2019-03-05 DIAGNOSIS — N64.4 BREAST PAIN, LEFT: ICD-10-CM

## 2019-03-05 LAB
ALBUMIN SERPL-MCNC: 3.9 G/DL (ref 3.4–5)
ALP SERPL-CCNC: 65 U/L (ref 40–150)
ALT SERPL W P-5'-P-CCNC: 24 U/L (ref 0–50)
ANION GAP SERPL CALCULATED.3IONS-SCNC: 6 MMOL/L (ref 3–14)
AST SERPL W P-5'-P-CCNC: 23 U/L (ref 0–45)
BILIRUB SERPL-MCNC: 0.3 MG/DL (ref 0.2–1.3)
BUN SERPL-MCNC: 14 MG/DL (ref 7–30)
CALCIUM SERPL-MCNC: 9.4 MG/DL (ref 8.5–10.1)
CHLORIDE SERPL-SCNC: 107 MMOL/L (ref 94–109)
CO2 SERPL-SCNC: 26 MMOL/L (ref 20–32)
CREAT SERPL-MCNC: 0.65 MG/DL (ref 0.52–1.04)
GFR SERPL CREATININE-BSD FRML MDRD: 81 ML/MIN/{1.73_M2}
GLUCOSE SERPL-MCNC: 96 MG/DL (ref 70–99)
POTASSIUM SERPL-SCNC: 4.3 MMOL/L (ref 3.4–5.3)
PROT SERPL-MCNC: 7.8 G/DL (ref 6.8–8.8)
SODIUM SERPL-SCNC: 139 MMOL/L (ref 133–144)
TSH SERPL DL<=0.005 MIU/L-ACNC: 1.17 MU/L (ref 0.4–4)

## 2019-03-05 PROCEDURE — G0279 TOMOSYNTHESIS, MAMMO: HCPCS

## 2019-03-05 PROCEDURE — 76642 ULTRASOUND BREAST LIMITED: CPT | Mod: LT

## 2019-03-05 PROCEDURE — 77066 DX MAMMO INCL CAD BI: CPT

## 2019-03-07 ENCOUNTER — TELEPHONE (OUTPATIENT)
Dept: INTERNAL MEDICINE | Facility: CLINIC | Age: 84
End: 2019-03-07

## 2019-03-07 NOTE — TELEPHONE ENCOUNTER
Please let the patient know that I did receive ENT records, and her throat concernes were not address, just her hearing complaint.    She should follow up with ENT for her throat concerns.

## 2019-03-08 ENCOUNTER — HOSPITAL ENCOUNTER (OUTPATIENT)
Dept: OCCUPATIONAL THERAPY | Facility: CLINIC | Age: 84
Setting detail: THERAPIES SERIES
End: 2019-03-08
Attending: OPHTHALMOLOGY
Payer: COMMERCIAL

## 2019-03-08 PROCEDURE — 97535 SELF CARE MNGMENT TRAINING: CPT | Mod: GO | Performed by: OCCUPATIONAL THERAPIST

## 2019-03-15 ENCOUNTER — HOSPITAL ENCOUNTER (OUTPATIENT)
Dept: OCCUPATIONAL THERAPY | Facility: CLINIC | Age: 84
Setting detail: THERAPIES SERIES
End: 2019-03-15
Attending: OPHTHALMOLOGY
Payer: COMMERCIAL

## 2019-03-15 PROCEDURE — 97535 SELF CARE MNGMENT TRAINING: CPT | Mod: GO | Performed by: OCCUPATIONAL THERAPIST

## 2019-04-05 ENCOUNTER — HOSPITAL ENCOUNTER (OUTPATIENT)
Dept: OCCUPATIONAL THERAPY | Facility: CLINIC | Age: 84
Setting detail: THERAPIES SERIES
End: 2019-04-05
Attending: OPHTHALMOLOGY
Payer: COMMERCIAL

## 2019-04-05 PROCEDURE — 97535 SELF CARE MNGMENT TRAINING: CPT | Mod: GO | Performed by: OCCUPATIONAL THERAPIST

## 2019-04-19 ENCOUNTER — HOSPITAL ENCOUNTER (OUTPATIENT)
Dept: OCCUPATIONAL THERAPY | Facility: CLINIC | Age: 84
Setting detail: THERAPIES SERIES
End: 2019-04-19
Attending: OPHTHALMOLOGY
Payer: COMMERCIAL

## 2019-04-19 PROCEDURE — 97535 SELF CARE MNGMENT TRAINING: CPT | Mod: GO | Performed by: OCCUPATIONAL THERAPIST

## 2019-05-31 ENCOUNTER — OFFICE VISIT (OUTPATIENT)
Dept: INTERNAL MEDICINE | Facility: CLINIC | Age: 84
End: 2019-05-31
Payer: COMMERCIAL

## 2019-05-31 VITALS
BODY MASS INDEX: 30.42 KG/M2 | OXYGEN SATURATION: 95 % | SYSTOLIC BLOOD PRESSURE: 142 MMHG | DIASTOLIC BLOOD PRESSURE: 72 MMHG | RESPIRATION RATE: 16 BRPM | TEMPERATURE: 98.6 F | HEIGHT: 65 IN | WEIGHT: 182.6 LBS | HEART RATE: 80 BPM

## 2019-05-31 DIAGNOSIS — E11.9 TYPE 2 DIABETES MELLITUS WITHOUT COMPLICATION, WITHOUT LONG-TERM CURRENT USE OF INSULIN (H): ICD-10-CM

## 2019-05-31 DIAGNOSIS — J06.9 VIRAL URI: Primary | ICD-10-CM

## 2019-05-31 DIAGNOSIS — J98.01 ACUTE BRONCHOSPASM: ICD-10-CM

## 2019-05-31 DIAGNOSIS — R35.0 URINARY FREQUENCY: ICD-10-CM

## 2019-05-31 LAB
ALBUMIN UR-MCNC: NEGATIVE MG/DL
APPEARANCE UR: CLEAR
BILIRUB UR QL STRIP: NEGATIVE
COLOR UR AUTO: YELLOW
GLUCOSE UR STRIP-MCNC: NEGATIVE MG/DL
HGB UR QL STRIP: NEGATIVE
KETONES UR STRIP-MCNC: NEGATIVE MG/DL
LEUKOCYTE ESTERASE UR QL STRIP: ABNORMAL
NITRATE UR QL: NEGATIVE
NON-SQ EPI CELLS #/AREA URNS LPF: NORMAL /LPF
PH UR STRIP: 5 PH (ref 5–7)
RBC #/AREA URNS AUTO: NORMAL /HPF
SOURCE: ABNORMAL
SP GR UR STRIP: 1.02 (ref 1–1.03)
UROBILINOGEN UR STRIP-ACNC: 0.2 EU/DL (ref 0.2–1)
WBC #/AREA URNS AUTO: NORMAL /HPF

## 2019-05-31 PROCEDURE — 81001 URINALYSIS AUTO W/SCOPE: CPT | Performed by: INTERNAL MEDICINE

## 2019-05-31 PROCEDURE — 99214 OFFICE O/P EST MOD 30 MIN: CPT | Performed by: INTERNAL MEDICINE

## 2019-05-31 ASSESSMENT — MIFFLIN-ST. JEOR: SCORE: 1271.21

## 2019-05-31 NOTE — NURSING NOTE
"Vital signs:  Temp: 98.6  F (37  C) Temp src: Oral BP: 142/72 Pulse: 80   Resp: 16 SpO2: 95 %     Height: 163.8 cm (5' 4.5\") Weight: 82.8 kg (182 lb 9.6 oz)  Estimated body mass index is 30.86 kg/m  as calculated from the following:    Height as of this encounter: 1.638 m (5' 4.5\").    Weight as of this encounter: 82.8 kg (182 lb 9.6 oz).          "

## 2019-05-31 NOTE — PROGRESS NOTES
Subjective     Norma F Alpers is a 84 year old female who presents to clinic today for the following health issues:    HPI     Complaints of upper respiratory symptoms. She reports symptoms began 1 week ago with sore throat, nasal congestion and rhinorrhea.  Started out mild, gradually increased with development of dry cough.  The range of via is continued, clear, frequent.  Her nose is mildly congested.  Her sore throat is much better now.  The cough is annoying but not keeping her awake at night.  She has had a little plugging of her left ear, some episodic disequilibrium.  She does complain of a little shortness of breath the past few days.    She also is complaining of urinary frequency.  She has had some minor leakage for a while, gradually worse.  She also now is getting up to go the bathroom usually one time a night, occasionally 2.  When she gets a little urge she will feel little drops of urine but otherwise no significant stress incontinence.  She does not drink caffeine beverages, does occasionally have some soda.    Symptoms:   Cough: Dry, irritative, frequent  Sore throat: Resolved  Nasal congestion: Mild to moderate  Rhinorrhea: Still very heavy, but thin mucus, some postnasal drainage  Sinus pain/pressure: None    She has taken Zyrtec episodically for her nasal symptoms but thinks it makes her sleepy and the day she did take it she felt very sleepy, better now    Patient Active Problem List   Diagnosis     Advanced directives, counseling/discussion     Benign essential hypertension     Hyperlipidemia LDL goal <100     GERD (gastroesophageal reflux disease)     Aphasic stroke     Osteopenia     Acquired hypothyroidism     Anxiety     Thyroid nodule     Type 2 diabetes mellitus without complication, without long-term current use of insulin (H)     Irritable bowel syndrome with both constipation and diarrhea     Major depressive disorder with single episode, in partial remission (H)      Current  Outpatient Medications   Medication Sig Dispense Refill     aspirin 325 MG tablet Take  by mouth daily.       blood glucose monitoring (ACCU-CHEK ANTONY SMARTVIEW) meter device kit Use to test blood sugar 1-2 times daily or as directed.  Ok to substitute alternative if insurance prefers. 1 kit 0     blood glucose monitoring (ACCU-CHEK SMARTVIEW) test strip Use to test blood sugar 2-3 times daily or as directed. 100 strip prn     Calcium Carbonate (CALCIUM 600 PO) Take 1 tablet by mouth daily 2 times daily       cetirizine (ZYRTEC) 10 MG tablet Take 10 mg by mouth daily as needed for allergies       EPINEPHrine (EPIPEN) 0.3 MG/0.3ML injection Inject 0.3 mLs (0.3 mg) into the muscle once as needed for anaphylaxis 2 each 5     FLUZONE HIGH-DOSE 0.5 ML injection ADM 0.5ML IM UTD  0     glimepiride (AMARYL) 1 MG tablet TAKE 1 TABLET (1 MG) BY MOUTH EVERY MORNING (BEFORE BREAKFAST) 90 tablet 2     glucosamine-chondroitinoitin 750-600 MG TABS Take 1 tablet by mouth 2 times daily.         levothyroxine (SYNTHROID/LEVOTHROID) 75 MCG tablet Take 1 tablet (75 mcg) by mouth every morning 90 tablet 1     lisinopril-hydrochlorothiazide (PRINZIDE/ZESTORETIC) 10-12.5 MG tablet Take 1 tablet by mouth daily 90 tablet 4     Multiple Vitamins-Minerals (MULTIVITAMIN ADULT) TABS Take 1 tablet by mouth daily       Multiple Vitamins-Minerals (PRESERVISION AREDS 2 PO) Take 1 tablet by mouth daily       omega-3 fatty acids (FISH OIL) 1200 MG capsule Take 1 capsule by mouth daily.       potassium chloride ER (KLOR-CON) 20 MEQ CR tablet TAKE 1 TABLET (20 MEQ) BY MOUTH 2 TIMES DAILY 180 tablet 4     pravastatin (PRAVACHOL) 80 MG tablet TAKE 1 TABLET (80 MG) BY MOUTH DAILY 90 tablet 4     psyllium (METAMUCIL) 58.6 % POWD Take by mouth daily       raloxifene (EVISTA) 60 MG tablet TAKE 1 TABLET BY MOUTH EVERY DAY *INS 90 tablet 4     ranitidine (ZANTAC) 150 MG tablet Take 1 tablet (150 mg) by mouth 2 times daily as needed for heartburn 60 tablet 5  "    sertraline (ZOLOFT) 100 MG tablet Take 1 tablet (100 mg) by mouth daily 90 tablet 1     thin (NO BRAND SPECIFIED) lancets 1 each daily 100 each 4     traZODone (DESYREL) 150 MG tablet TAKE 1 TABLET (150 MG) BY MOUTH AT BEDTIME 90 tablet 1     TURMERIC PO Take 2 tablets by mouth daily        valACYclovir (VALTREX) 500 MG tablet TAKE 2 TABLET TWICE A DAY BY MOUTH X 2 DAYS FOR FLARES.  0     vitamin B complex with vitamin C (VITAMIN  B COMPLEX) TABS tablet Take 1 tablet by mouth daily        Social History     Tobacco Use     Smoking status: Former Smoker     Last attempt to quit: 3/27/1975     Years since quittin.2     Smokeless tobacco: Never Used   Substance Use Topics     Alcohol use: Yes     Comment: occasionally     Drug use: No     ROS: ear symptoms: Some pressure in the left ear few days ago, better now, no fever, chills, positive dyspnea, no chest pain, mild malaise, no headache  No cloudy urine, blood in the urine, it seems medium yellow    Objective    /72   Pulse 80   Temp 98.6  F (37  C) (Oral)   Resp 16   Ht 1.638 m (5' 4.5\")   Wt 82.8 kg (182 lb 9.6 oz)   SpO2 95%   BMI 30.86 kg/m    Body mass index is 30.86 kg/m .  Physical Exam       TM's are clear right, dull left  Nasal mucosa with moderate edema, erythema. Mucus is present, clear  Sinuses are without tenderness  Posterior pharynx without erythema, without exudate  Anterior cervical nodes are not present   Lungs there are soft wheezes throughout, increased with forced expiration, no crackles.       UA is negative except for trace leukocyte esterase    Assessment & Plan     1. Viral URI  Advised her symptoms are consistent with a viral URI, advised to use a nonsedating antihistamine like Claritin but avoid Sudafed due to potential side effects.  Also advised on cough suppression, see patient instructions.  Advised to call for significant fever, chills, worsening breathing or new symptoms.    2. Acute bronchospasm  She has some " mild bronchospasm, she did have this with URI last fall and does have an inhaler.  Recommend she start the inhaler again 4 times a day, advised her on how to use it properly and what she can expect from it.  Call for any worsening symptoms.    3. Urinary frequency  Advised there is no evidence of UTI, she likely has a little bit of overactive bladder type symptoms.  Discussed avoiding caffeine, carbonated beverages.  She can try Crystal's as there may be a little stress incontinence.  Advised about medications but she is not interested in starting medicine at this time  - UA reflex to Microscopic and Culture  - Urine Microscopic         Return in about 4 months (around 10/1/2019) for Lab Work, Diabetes, see me a week after lab.    Adelita Kennedy MD  [facility]

## 2019-05-31 NOTE — PATIENT INSTRUCTIONS
Use either Mucinex DM one tablet twice a day or Robitussin Cough and Chest congestion DM syrup 4 times a day for cough and to keep mucus thin.      Use the inhaler 2 puffs 4 times a day until breathing and cough are good for a few days.

## 2019-06-11 ENCOUNTER — TELEPHONE (OUTPATIENT)
Dept: INTERNAL MEDICINE | Facility: CLINIC | Age: 84
End: 2019-06-11

## 2019-06-11 NOTE — TELEPHONE ENCOUNTER
Panel Management Review      Patient has the following on her problem list:     Depression / Dysthymia review    Measure:  Needs PHQ-9 score of 4 or less during index window.  Administer PHQ-9 and if score is 5 or more, send encounter to provider for next steps.    5 - 7 month window range: no    PHQ-9 SCORE 10/9/2018 11/15/2018 3/4/2019   PHQ-9 Total Score - - -   PHQ-9 Total Score 15 7 10       If PHQ-9 recheck is 5 or more, route to provider for next steps.    Patient is due for:  PHQ9    Diabetes    ASA: Passed    Last A1C  Lab Results   Component Value Date    A1C 6.5 03/04/2019    A1C 6.4 10/09/2018    A1C 6.6 05/01/2018    A1C 6.6 10/12/2017    A1C 6.3 02/28/2017     A1C tested: Passed    Last LDL:    Lab Results   Component Value Date    CHOL 194 10/09/2018     Lab Results   Component Value Date    HDL 51 10/09/2018     Lab Results   Component Value Date     10/09/2018     Lab Results   Component Value Date    TRIG 192 10/09/2018     Lab Results   Component Value Date    CHOLHDLRATIO 2.6 01/14/2015     Lab Results   Component Value Date    NHDL 143 10/09/2018       Is the patient on a Statin? YES             Is the patient on Aspirin? YES    Medications     HMG CoA Reductase Inhibitors     pravastatin (PRAVACHOL) 80 MG tablet       Salicylates     aspirin 325 MG tablet             Last three blood pressure readings:  BP Readings from Last 3 Encounters:   05/31/19 142/72   03/04/19 120/76   10/09/18 122/74       Date of last diabetes office visit: 3/2019     Tobacco History:     History   Smoking Status     Former Smoker     Quit date: 3/27/1975   Smokeless Tobacco     Never Used         Hypertension   Last three blood pressure readings:  BP Readings from Last 3 Encounters:   05/31/19 142/72   03/04/19 120/76   10/09/18 122/74     Blood pressure: MONITOR    HTN Guidelines:  Less than 140/90      Composite cancer screening  Chart review shows that this patient is due/due soon for the following  None  Summary:    Patient is due/failing the following:   FOLLOW UP and PHQ9    Action needed:   Patient needs to do PHQ9.    Type of outreach:    Sent letter.    Questions for provider review:    None                                                                                                                                    Emily Ruiz CMA       Chart routed to no one .

## 2019-06-11 NOTE — LETTER
[facility]  [address], [address]  [place], [place]  [address]                                            TEL:[phone]  FAX:[phone]        Norma F Alpers  [address]   [facility] [address]      June 11, 2019    Dear Lata,    At [facility] we care about your health and well-being.  A review of your chart has indicated that you are due for a depression follow up.  Please contact us at [phone] to schedule an appointment.    If you have already had one or all of the above screening tests at another facility, please call us to update your chart.         Sincerely,      Anika Ibrahim M.D.

## 2019-07-10 DIAGNOSIS — E03.9 ACQUIRED HYPOTHYROIDISM: ICD-10-CM

## 2019-07-11 RX ORDER — LEVOTHYROXINE SODIUM 75 UG/1
75 TABLET ORAL EVERY MORNING
Qty: 90 TABLET | Refills: 1 | OUTPATIENT
Start: 2019-07-11

## 2019-07-11 NOTE — TELEPHONE ENCOUNTER
"Requested Prescriptions   Pending Prescriptions Disp Refills     levothyroxine (SYNTHROID/LEVOTHROID) 75 MCG tablet [Pharmacy Med Name: LEVOTHYROXINE 75 MCG TABLET] 90 tablet 1     Sig: TAKE 1 TABLET (75 MCG) BY MOUTH EVERY MORNING   Last Written Prescription Date:  02/20/2019  Last Fill Quantity: 90,  # refills: 01   Last office visit: 5/31/2019 with prescribing provider:     Future Office Visit:      Thyroid Protocol Passed - 7/10/2019  6:38 PM        Passed - Patient is 12 years or older        Passed - Recent (12 mo) or future (30 days) visit within the authorizing provider's specialty     Patient had office visit in the last 12 months or has a visit in the next 30 days with authorizing provider or within the authorizing provider's specialty.  See \"Patient Info\" tab in inbasket, or \"Choose Columns\" in Meds & Orders section of the refill encounter.              Passed - Medication is active on med list        Passed - Normal TSH on file in past 12 months     Recent Labs   Lab Test 03/04/19  1129   TSH 1.17              Passed - No active pregnancy on record     If patient is pregnant or has had a positive pregnancy test, please check TSH.          Passed - No positive pregnancy test in past 12 months     If patient is pregnant or has had a positive pregnancy test, please check TSH.          "

## 2019-07-18 DIAGNOSIS — G47.00 INSOMNIA, UNSPECIFIED TYPE: ICD-10-CM

## 2019-07-18 RX ORDER — TRAZODONE HYDROCHLORIDE 150 MG/1
TABLET ORAL
Qty: 90 TABLET | Refills: 1 | Status: SHIPPED | OUTPATIENT
Start: 2019-07-18 | End: 2019-09-30

## 2019-07-18 NOTE — TELEPHONE ENCOUNTER
"Requested Prescriptions   Pending Prescriptions Disp Refills     traZODone (DESYREL) 150 MG tablet [Pharmacy Med Name: TRAZODONE 150 MG TABLET] 90 tablet 1     Sig: TAKE 1 TABLET (150 MG) BY MOUTH AT BEDTIME   Last Written Prescription Date:  01/06/2019  Last Fill Quantity: 90,  # refills: 01   Last office visit:05/31/2019 with prescribing provider:     Future Office Visit:      Serotonin Modulators Passed - 7/18/2019  1:36 AM        Passed - Recent (12 mo) or future (30 days) visit within the authorizing provider's specialty     Patient had office visit in the last 12 months or has a visit in the next 30 days with authorizing provider or within the authorizing provider's specialty.  See \"Patient Info\" tab in inbasket, or \"Choose Columns\" in Meds & Orders section of the refill encounter.              Passed - Medication is active on med list        Passed - Patient is age 18 or older        Passed - No active pregnancy on record        Passed - No positive pregnancy test in past 12 months        "

## 2019-09-20 DIAGNOSIS — E11.9 TYPE 2 DIABETES MELLITUS WITHOUT COMPLICATION, WITHOUT LONG-TERM CURRENT USE OF INSULIN (H): ICD-10-CM

## 2019-09-20 LAB
ANION GAP SERPL CALCULATED.3IONS-SCNC: 13 MMOL/L (ref 3–14)
BUN SERPL-MCNC: 21 MG/DL (ref 7–30)
CALCIUM SERPL-MCNC: 9.7 MG/DL (ref 8.5–10.1)
CHLORIDE SERPL-SCNC: 105 MMOL/L (ref 94–109)
CHOLEST SERPL-MCNC: 213 MG/DL
CO2 SERPL-SCNC: 21 MMOL/L (ref 20–32)
CREAT SERPL-MCNC: 0.69 MG/DL (ref 0.52–1.04)
CREAT UR-MCNC: 107 MG/DL
GFR SERPL CREATININE-BSD FRML MDRD: 80 ML/MIN/{1.73_M2}
GLUCOSE SERPL-MCNC: 131 MG/DL (ref 70–99)
HBA1C MFR BLD: 6.4 % (ref 0–5.6)
HDLC SERPL-MCNC: 47 MG/DL
LDLC SERPL CALC-MCNC: 111 MG/DL
MICROALBUMIN UR-MCNC: 20 MG/L
MICROALBUMIN/CREAT UR: 18.5 MG/G CR (ref 0–25)
NONHDLC SERPL-MCNC: 166 MG/DL
POTASSIUM SERPL-SCNC: 3.7 MMOL/L (ref 3.4–5.3)
SODIUM SERPL-SCNC: 139 MMOL/L (ref 133–144)
TRIGL SERPL-MCNC: 274 MG/DL

## 2019-09-20 PROCEDURE — 80048 BASIC METABOLIC PNL TOTAL CA: CPT | Performed by: INTERNAL MEDICINE

## 2019-09-20 PROCEDURE — 80061 LIPID PANEL: CPT | Performed by: INTERNAL MEDICINE

## 2019-09-20 PROCEDURE — 82043 UR ALBUMIN QUANTITATIVE: CPT | Performed by: INTERNAL MEDICINE

## 2019-09-20 PROCEDURE — 36415 COLL VENOUS BLD VENIPUNCTURE: CPT | Performed by: INTERNAL MEDICINE

## 2019-09-20 PROCEDURE — 83036 HEMOGLOBIN GLYCOSYLATED A1C: CPT | Performed by: INTERNAL MEDICINE

## 2019-09-22 DIAGNOSIS — F41.9 ANXIETY: ICD-10-CM

## 2019-09-23 NOTE — TELEPHONE ENCOUNTER
"Requested Prescriptions   Pending Prescriptions Disp Refills     sertraline (ZOLOFT) 100 MG tablet [Pharmacy Med Name: SERTRALINE  MG TABLET] 90 tablet 1     Sig: TAKE 1 TABLET BY MOUTH EVERY DAY   Last Written Prescription Date:  03/04/2019  Last Fill Quantity: 90,  # refills: 01   Last office visit: 5/31/2019 with prescribing provider:     Future Office Visit:   Next 5 appointments (look out 90 days)    Sep 30, 2019 10:40 AM CDT  SHORT with Adelita Kennedy MD  [facility] ([facility] [address]  [facility] [address]  [phone]           SSRIs Protocol Passed - 9/22/2019  7:33 AM        Passed - Recent (12 mo) or future (30 days) visit within the authorizing provider's specialty     Patient had office visit in the last 12 months or has a visit in the next 30 days with authorizing provider or within the authorizing provider's specialty.  See \"Patient Info\" tab in inbasket, or \"Choose Columns\" in Meds & Orders section of the refill encounter.              Passed - Medication is active on med list        Passed - Patient is age 18 or older        Passed - No active pregnancy on record        Passed - No positive pregnancy test in last 12 months        "

## 2019-09-25 RX ORDER — SERTRALINE HYDROCHLORIDE 100 MG/1
TABLET, FILM COATED ORAL
Qty: 90 TABLET | Refills: 0 | Status: SHIPPED | OUTPATIENT
Start: 2019-09-25 | End: 2019-09-30

## 2019-09-30 ENCOUNTER — OFFICE VISIT (OUTPATIENT)
Dept: INTERNAL MEDICINE | Facility: CLINIC | Age: 84
End: 2019-09-30
Payer: COMMERCIAL

## 2019-09-30 VITALS
OXYGEN SATURATION: 98 % | WEIGHT: 180 LBS | RESPIRATION RATE: 16 BRPM | BODY MASS INDEX: 29.99 KG/M2 | TEMPERATURE: 98.4 F | HEIGHT: 65 IN | HEART RATE: 118 BPM | DIASTOLIC BLOOD PRESSURE: 80 MMHG | SYSTOLIC BLOOD PRESSURE: 132 MMHG

## 2019-09-30 DIAGNOSIS — F41.9 ANXIETY: ICD-10-CM

## 2019-09-30 DIAGNOSIS — E03.9 ACQUIRED HYPOTHYROIDISM: ICD-10-CM

## 2019-09-30 DIAGNOSIS — F32.4 MAJOR DEPRESSIVE DISORDER WITH SINGLE EPISODE, IN PARTIAL REMISSION (H): ICD-10-CM

## 2019-09-30 DIAGNOSIS — I10 BENIGN ESSENTIAL HYPERTENSION: ICD-10-CM

## 2019-09-30 DIAGNOSIS — J06.9 VIRAL URI: ICD-10-CM

## 2019-09-30 DIAGNOSIS — G47.00 INSOMNIA, UNSPECIFIED TYPE: ICD-10-CM

## 2019-09-30 DIAGNOSIS — H35.3213 EXUDATIVE AGE-RELATED MACULAR DEGENERATION OF RIGHT EYE WITH INACTIVE SCAR (H): ICD-10-CM

## 2019-09-30 DIAGNOSIS — E78.5 HYPERLIPIDEMIA LDL GOAL <100: ICD-10-CM

## 2019-09-30 DIAGNOSIS — E11.9 TYPE 2 DIABETES MELLITUS WITHOUT COMPLICATION, WITHOUT LONG-TERM CURRENT USE OF INSULIN (H): Primary | ICD-10-CM

## 2019-09-30 PROCEDURE — 99207 C FOOT EXAM  NO CHARGE: CPT | Mod: 25 | Performed by: INTERNAL MEDICINE

## 2019-09-30 PROCEDURE — 99214 OFFICE O/P EST MOD 30 MIN: CPT | Performed by: INTERNAL MEDICINE

## 2019-09-30 RX ORDER — SERTRALINE HYDROCHLORIDE 100 MG/1
100 TABLET, FILM COATED ORAL DAILY
Qty: 90 TABLET | Refills: 1 | Status: SHIPPED | OUTPATIENT
Start: 2019-09-30 | End: 2020-07-11

## 2019-09-30 RX ORDER — TRAZODONE HYDROCHLORIDE 150 MG/1
150 TABLET ORAL AT BEDTIME
Qty: 90 TABLET | Refills: 1 | Status: SHIPPED | OUTPATIENT
Start: 2019-09-30 | End: 2020-04-20

## 2019-09-30 RX ORDER — LEVOTHYROXINE SODIUM 75 UG/1
75 TABLET ORAL EVERY MORNING
Qty: 90 TABLET | Refills: 1 | Status: SHIPPED | OUTPATIENT
Start: 2019-09-30 | End: 2020-07-11

## 2019-09-30 ASSESSMENT — PATIENT HEALTH QUESTIONNAIRE - PHQ9: SUM OF ALL RESPONSES TO PHQ QUESTIONS 1-9: 8

## 2019-09-30 ASSESSMENT — MIFFLIN-ST. JEOR: SCORE: 1259.41
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
## 2019-10-28 ENCOUNTER — ANCILLARY PROCEDURE (OUTPATIENT)
Dept: GENERAL RADIOLOGY | Facility: CLINIC | Age: 84
End: 2019-10-28
Attending: NURSE PRACTITIONER
Payer: COMMERCIAL

## 2019-10-28 ENCOUNTER — OFFICE VISIT (OUTPATIENT)
Dept: INTERNAL MEDICINE | Facility: CLINIC | Age: 84
End: 2019-10-28
Payer: COMMERCIAL

## 2019-10-28 VITALS
HEIGHT: 65 IN | TEMPERATURE: 98.8 F | HEART RATE: 99 BPM | DIASTOLIC BLOOD PRESSURE: 84 MMHG | BODY MASS INDEX: 29.91 KG/M2 | OXYGEN SATURATION: 95 % | SYSTOLIC BLOOD PRESSURE: 136 MMHG | WEIGHT: 179.5 LBS | RESPIRATION RATE: 18 BRPM

## 2019-10-28 DIAGNOSIS — M25.552 HIP PAIN, LEFT: ICD-10-CM

## 2019-10-28 DIAGNOSIS — M54.2 NECK PAIN: Primary | ICD-10-CM

## 2019-10-28 DIAGNOSIS — G44.209 ACUTE NON INTRACTABLE TENSION-TYPE HEADACHE: ICD-10-CM

## 2019-10-28 PROCEDURE — 72170 X-RAY EXAM OF PELVIS: CPT

## 2019-10-28 PROCEDURE — 99214 OFFICE O/P EST MOD 30 MIN: CPT | Performed by: NURSE PRACTITIONER

## 2019-10-28 ASSESSMENT — MIFFLIN-ST. JEOR: SCORE: 1257.15

## 2019-10-28 NOTE — NURSING NOTE
"/84 (BP Location: Left arm, Patient Position: Sitting, Cuff Size: Adult Regular)   Pulse 99   Temp 98.8  F (37.1  C) (Oral)   Resp 18   Ht 1.638 m (5' 4.5\")   Wt 81.4 kg (179 lb 8 oz)   SpO2 95%   BMI 30.34 kg/m      "
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
## 2019-10-29 ENCOUNTER — TELEPHONE (OUTPATIENT)
Dept: INTERNAL MEDICINE | Facility: CLINIC | Age: 84
End: 2019-10-29

## 2019-11-05 ENCOUNTER — HOSPITAL ENCOUNTER (OUTPATIENT)
Dept: CT IMAGING | Facility: CLINIC | Age: 84
Discharge: HOME | End: 2019-11-05
Attending: NURSE PRACTITIONER | Admitting: NURSE PRACTITIONER
Payer: COMMERCIAL

## 2019-11-05 DIAGNOSIS — G44.209 ACUTE NON INTRACTABLE TENSION-TYPE HEADACHE: ICD-10-CM

## 2019-11-05 PROCEDURE — 70450 CT HEAD/BRAIN W/O DYE: CPT

## 2019-11-06 ENCOUNTER — TELEPHONE (OUTPATIENT)
Dept: INTERNAL MEDICINE | Facility: CLINIC | Age: 84
End: 2019-11-06

## 2019-11-06 NOTE — TELEPHONE ENCOUNTER
Please advise pt head CT showed only chronic age related atrophy, no masses, bleeding or fractures.  Let us know if PT is not helping  Elizabeth Thompson CNP

## 2019-11-20 ENCOUNTER — OFFICE VISIT (OUTPATIENT)
Dept: INTERNAL MEDICINE | Facility: CLINIC | Age: 84
End: 2019-11-20
Payer: COMMERCIAL

## 2019-11-20 VITALS
HEART RATE: 100 BPM | RESPIRATION RATE: 12 BRPM | DIASTOLIC BLOOD PRESSURE: 76 MMHG | BODY MASS INDEX: 29.66 KG/M2 | HEIGHT: 65 IN | OXYGEN SATURATION: 96 % | TEMPERATURE: 97.9 F | SYSTOLIC BLOOD PRESSURE: 130 MMHG | WEIGHT: 178 LBS

## 2019-11-20 DIAGNOSIS — R60.9 PAROTID SWELLING: Primary | ICD-10-CM

## 2019-11-20 PROCEDURE — 99213 OFFICE O/P EST LOW 20 MIN: CPT | Performed by: INTERNAL MEDICINE

## 2019-11-20 ASSESSMENT — MIFFLIN-ST. JEOR: SCORE: 1245.34

## 2019-11-20 NOTE — NURSING NOTE
"Vital signs:  Temp: 97.9  F (36.6  C) Temp src: Oral BP: (!) 146/80 Pulse: 100   Resp: 12 SpO2: 96 %     Height: 163.8 cm (5' 4.5\") Weight: 80.7 kg (178 lb)  Estimated body mass index is 30.08 kg/m  as calculated from the following:    Height as of this encounter: 1.638 m (5' 4.5\").    Weight as of this encounter: 80.7 kg (178 lb).        "

## 2019-11-20 NOTE — PROGRESS NOTES
Subjective     Norma F Alpers is a 85 year old female who presents to clinic today for the following health issues:    HPI   Swelling/bump/pain:    Presents with pain and swelling in the right jaw area in front of the ear. For a week. No injury. No redness. No fevers. Able to eat. No pain in the teeth. Pain worse with pressing over the area. No ear pain or change in hearing.       Patient Active Problem List   Diagnosis     Advanced directives, counseling/discussion     Benign essential hypertension     Hyperlipidemia LDL goal <100     GERD (gastroesophageal reflux disease)     Aphasic stroke     Osteopenia     Acquired hypothyroidism     Anxiety     Thyroid nodule     Type 2 diabetes mellitus without complication, without long-term current use of insulin (H)     Irritable bowel syndrome with both constipation and diarrhea     Major depressive disorder with single episode, in partial remission (H)     Exudative age-related macular degeneration of right eye with inactive scar (H)     Past Surgical History:   Procedure Laterality Date     APPENDECTOMY      Ruptured --peritonitis     C NONSPECIFIC PROCEDURE      Left foot surgery     COCCYGECTOMY  2011    Zuleyka Vernell     HERNIA REPAIR      Abdomen     HYSTERECTOMY, PAP NO LONGER INDICATED       RELEASE TRIGGER FINGER  2014    Procedure: RELEASE TRIGGER FINGER;  Release Trigger Thumb ;  Surgeon: Gurpreet Mast MD;  Location:  OR       Social History     Tobacco Use     Smoking status: Former Smoker     Packs/day: 0.00     Last attempt to quit: 3/27/1975     Years since quittin.6     Smokeless tobacco: Never Used   Substance Use Topics     Alcohol use: Yes     Comment: occasionally     Family History   Problem Relation Age of Onset     Alcohol/Drug Father          age 80, stroke     Cardiovascular Mother          age 92, CHF, DM     Diabetes Brother          age 30, DM     C.A.D. Brother          age 46     Diabetes Sister          Born 1932, also HTN, lipids         Current Outpatient Medications   Medication Sig Dispense Refill     amoxicillin-clavulanate (AUGMENTIN) 875-125 MG tablet Take 1 tablet by mouth 2 times daily 14 tablet 0     aspirin 325 MG tablet Take  by mouth daily.       blood glucose monitoring (ACCU-CHEK ANTONY SMARTVIEW) meter device kit Use to test blood sugar 1-2 times daily or as directed.  Ok to substitute alternative if insurance prefers. 1 kit 0     blood glucose monitoring (ACCU-CHEK SMARTVIEW) test strip Use to test blood sugar 2-3 times daily or as directed. 100 strip prn     Calcium Carbonate (CALCIUM 600 PO) Take 1 tablet by mouth daily 2 times daily       cetirizine (ZYRTEC) 10 MG tablet Take 10 mg by mouth daily as needed for allergies       EPINEPHrine (EPIPEN) 0.3 MG/0.3ML injection Inject 0.3 mLs (0.3 mg) into the muscle once as needed for anaphylaxis 2 each 5     glimepiride (AMARYL) 1 MG tablet TAKE 1 TABLET (1 MG) BY MOUTH EVERY MORNING (BEFORE BREAKFAST) 90 tablet 2     glucosamine-chondroitinoitin 750-600 MG TABS Take 1 tablet by mouth 2 times daily.         levothyroxine (SYNTHROID/LEVOTHROID) 75 MCG tablet Take 1 tablet (75 mcg) by mouth every morning 90 tablet 1     lisinopril-hydrochlorothiazide (PRINZIDE/ZESTORETIC) 10-12.5 MG tablet Take 1 tablet by mouth daily 90 tablet 4     Multiple Vitamins-Minerals (MULTIVITAMIN ADULT) TABS Take 1 tablet by mouth daily       Multiple Vitamins-Minerals (PRESERVISION AREDS 2 PO) Take 1 tablet by mouth daily       omega-3 fatty acids (FISH OIL) 1200 MG capsule Take 1 capsule by mouth daily.       potassium chloride ER (KLOR-CON) 20 MEQ CR tablet TAKE 1 TABLET (20 MEQ) BY MOUTH 2 TIMES DAILY 180 tablet 4     pravastatin (PRAVACHOL) 80 MG tablet TAKE 1 TABLET (80 MG) BY MOUTH DAILY 90 tablet 4     psyllium (METAMUCIL) 58.6 % POWD Take by mouth daily       raloxifene (EVISTA) 60 MG tablet TAKE 1 TABLET BY MOUTH EVERY DAY *INS 90 tablet 4     ranitidine (ZANTAC) 150  "MG tablet Take 1 tablet (150 mg) by mouth 2 times daily as needed for heartburn 60 tablet 5     sertraline (ZOLOFT) 100 MG tablet Take 1 tablet (100 mg) by mouth daily 90 tablet 1     traZODone (DESYREL) 150 MG tablet Take 1 tablet (150 mg) by mouth At Bedtime 90 tablet 1     TURMERIC PO Take 2 tablets by mouth daily        valACYclovir (VALTREX) 500 MG tablet TAKE 2 TABLET TWICE A DAY BY MOUTH X 2 DAYS FOR FLARES.  0     vitamin B complex with vitamin C (VITAMIN  B COMPLEX) TABS tablet Take 1 tablet by mouth daily       thin (NO BRAND SPECIFIED) lancets 1 each daily 100 each 4         Reviewed and updated as needed this visit by Provider         Review of Systems   ROS COMP: Constitutional, HEENT, cardiovascular, pulmonary, gi and gu systems are negative, except as otherwise noted.      Objective    BP (!) 146/80   Pulse 100   Temp 97.9  F (36.6  C) (Oral)   Resp 12   Ht 1.638 m (5' 4.5\")   Wt 80.7 kg (178 lb)   SpO2 96%   BMI 30.08 kg/m    Body mass index is 30.08 kg/m .  Physical Exam   GENERAL: healthy, alert and no distress  EYES: Eyes grossly normal to inspection, PERRL and conjunctivae and sclerae normal  HENT: ear canals and TM's normal, nose and mouth without ulcers or lesions            Right beck auricular area soft tissues edema and palpatory tenderness 3 cm diameter. No erythema, able to open and close mouth with no restrictions   NECK: no adenopathy, no asymmetry, masses, or scars and thyroid normal to palpation  MS: no gross musculoskeletal defects noted, no edema    Diagnostic Test Results:  Labs reviewed in Epic        Assessment & Plan   Problem List Items Addressed This Visit     None      Visit Diagnoses     Parotid swelling    -  Primary    Relevant Medications    amoxicillin-clavulanate (AUGMENTIN) 875-125 MG tablet    Other Relevant Orders    US Head Neck Soft Tissue        Clinically presents with parotid swelling and pain, possible bacterial parotitis, parotid gland stones, tumor " "  Start on antibiotic   Assess US of the parotid       BMI:   Estimated body mass index is 30.08 kg/m  as calculated from the following:    Height as of this encounter: 1.638 m (5' 4.5\").    Weight as of this encounter: 80.7 kg (178 lb).           See Patient Instructions  Return in about 1 week (around 11/27/2019) for follow up on acute problem if persists.    Thanh Keith MD  [facility]        "

## 2019-11-22 ENCOUNTER — HOSPITAL ENCOUNTER (OUTPATIENT)
Dept: ULTRASOUND IMAGING | Facility: CLINIC | Age: 84
Discharge: HOME | End: 2019-11-22
Attending: INTERNAL MEDICINE | Admitting: INTERNAL MEDICINE
Payer: COMMERCIAL

## 2019-11-22 DIAGNOSIS — K11.8 PAROTID MASS: Primary | ICD-10-CM

## 2019-11-22 DIAGNOSIS — R60.9 PAROTID SWELLING: ICD-10-CM

## 2019-11-22 PROCEDURE — 76536 US EXAM OF HEAD AND NECK: CPT

## 2019-12-01 ENCOUNTER — HOSPITAL ENCOUNTER (EMERGENCY)
Facility: CLINIC | Age: 84
Discharge: HOME | End: 2019-12-01
Attending: EMERGENCY MEDICINE | Admitting: EMERGENCY MEDICINE
Payer: COMMERCIAL

## 2019-12-01 VITALS
SYSTOLIC BLOOD PRESSURE: 129 MMHG | BODY MASS INDEX: 30.08 KG/M2 | HEART RATE: 92 BPM | WEIGHT: 178 LBS | RESPIRATION RATE: 17 BRPM | TEMPERATURE: 98.2 F | OXYGEN SATURATION: 94 % | DIASTOLIC BLOOD PRESSURE: 75 MMHG

## 2019-12-01 DIAGNOSIS — T78.40XA ALLERGIC REACTION, INITIAL ENCOUNTER: ICD-10-CM

## 2019-12-01 DIAGNOSIS — T78.3XXA ANGIOEDEMA, INITIAL ENCOUNTER: ICD-10-CM

## 2019-12-01 LAB — GLUCOSE BLDC GLUCOMTR-MCNC: 134 MG/DL (ref 70–99)

## 2019-12-01 PROCEDURE — 96374 THER/PROPH/DIAG INJ IV PUSH: CPT

## 2019-12-01 PROCEDURE — 99284 EMERGENCY DEPT VISIT MOD MDM: CPT | Mod: 25

## 2019-12-01 PROCEDURE — 25800030 ZZH RX IP 258 OP 636: Performed by: EMERGENCY MEDICINE

## 2019-12-01 PROCEDURE — 96375 TX/PRO/DX INJ NEW DRUG ADDON: CPT

## 2019-12-01 PROCEDURE — 25000128 H RX IP 250 OP 636: Performed by: EMERGENCY MEDICINE

## 2019-12-01 PROCEDURE — 96361 HYDRATE IV INFUSION ADD-ON: CPT

## 2019-12-01 PROCEDURE — 00000146 ZZHCL STATISTIC GLUCOSE BY METER IP

## 2019-12-01 PROCEDURE — 25000125 ZZHC RX 250: Performed by: EMERGENCY MEDICINE

## 2019-12-01 RX ORDER — METHYLPREDNISOLONE SODIUM SUCCINATE 125 MG/2ML
125 INJECTION, POWDER, LYOPHILIZED, FOR SOLUTION INTRAMUSCULAR; INTRAVENOUS ONCE
Status: COMPLETED | OUTPATIENT
Start: 2019-12-01 | End: 2019-12-01

## 2019-12-01 RX ORDER — PREDNISONE 20 MG/1
20 TABLET ORAL DAILY
Qty: 4 TABLET | Refills: 0 | Status: SHIPPED | OUTPATIENT
Start: 2019-12-01 | End: 2019-12-01

## 2019-12-01 RX ORDER — SODIUM CHLORIDE 9 MG/ML
INJECTION, SOLUTION INTRAVENOUS CONTINUOUS
Status: DISCONTINUED | OUTPATIENT
Start: 2019-12-01 | End: 2019-12-01 | Stop reason: HOSPADM

## 2019-12-01 RX ORDER — DIPHENHYDRAMINE HYDROCHLORIDE 50 MG/ML
50 INJECTION INTRAMUSCULAR; INTRAVENOUS ONCE
Status: COMPLETED | OUTPATIENT
Start: 2019-12-01 | End: 2019-12-01

## 2019-12-01 RX ORDER — PREDNISONE 20 MG/1
20 TABLET ORAL DAILY
Qty: 5 TABLET | Refills: 0 | Status: SHIPPED | OUTPATIENT
Start: 2019-12-02 | End: 2019-12-02

## 2019-12-01 RX ORDER — DIPHENHYDRAMINE HCL 25 MG
25-50 CAPSULE ORAL EVERY 6 HOURS PRN
Qty: 20 CAPSULE | Refills: 0 | Status: SHIPPED | OUTPATIENT
Start: 2019-12-01 | End: 2020-01-09

## 2019-12-01 RX ORDER — LIDOCAINE 40 MG/G
CREAM TOPICAL
Status: DISCONTINUED | OUTPATIENT
Start: 2019-12-01 | End: 2019-12-01 | Stop reason: HOSPADM

## 2019-12-01 RX ADMIN — FAMOTIDINE 20 MG: 10 INJECTION, SOLUTION INTRAVENOUS at 14:05

## 2019-12-01 RX ADMIN — DIPHENHYDRAMINE HYDROCHLORIDE 50 MG: 50 INJECTION, SOLUTION INTRAMUSCULAR; INTRAVENOUS at 13:49

## 2019-12-01 RX ADMIN — METHYLPREDNISOLONE SODIUM SUCCINATE 125 MG: 125 INJECTION, POWDER, FOR SOLUTION INTRAMUSCULAR; INTRAVENOUS at 13:49

## 2019-12-01 RX ADMIN — SODIUM CHLORIDE: 9 INJECTION, SOLUTION INTRAVENOUS at 13:49

## 2019-12-01 ASSESSMENT — ENCOUNTER SYMPTOMS
TROUBLE SWALLOWING: 0
FACIAL SWELLING: 1
SORE THROAT: 0

## 2019-12-01 NOTE — DISCHARGE INSTRUCTIONS
Stop lisinopril asap  See your doctor right away to start a new blood pressure medication  Steroids for 5 days    You should return immediately to the emergency department should you have increasing swelling of your lips, tongue, you feel throat tightness, worsening abdominal pain or vomiting.  You should also return should you have spreading redness of your hands or develop fever.

## 2019-12-01 NOTE — ED PROVIDER NOTES
On lisinopril, coming in with lower lip swelling.  No GI sx, no throat pain, trouble breathing.  Also has hand swelling and hives.     Hands improved.  No worsening lip swelling.       On recheck, patient reported feeling improved, she denied any tongue swelling trouble swallowing or breathing.  Her hands appeared even less swollen and red than on my initial evaluation upon the start of my shift.  I see no indication for hospitalization, will discharge with a prescription for steroids and Benadryl.  Strict return precautions were reviewed including any worsening of her swelling, extending redness of her hands or fever.  I implored her to follow closely with her PCP for recheck.      Homero Sheikh MD  [employer]  3:58 PM 12/01/19        Homero Sheikh MD  12/01/19 [id number]

## 2019-12-01 NOTE — ED TRIAGE NOTES
Pt here with lower lip swelling that started this morning around 0900, yesterday noticed swelling to hands bilaterally. No throat or tongue, swelling or difficulty swallowing or speaking. Lungs clear. No hives. C/o itching. Pt on lisinopril for years. Hasn't taken any meds PTA. ABC intact. A&O x4.

## 2019-12-01 NOTE — ED PROVIDER NOTES
History     Chief Complaint:  Possible allergic reaction      The history is provided by the patient.      Norma F Alpers is a 85 year old female on Lisinopril who presents with allergic reaction. According to the patient, she noticed swelling of her hands starting yesterday, and she noticed her lower lip was swollen around 0900 today. She reports she has been itchy all night. She reports her throat and tongue feel fine, and denies trouble swallowing. She reports her neighbor took her blood sugar and it was normal, and she took tylenol last night. She is not able to identify possible triggers for the reaction, other than consumption of sardines, which is very normal for her. She denies eating anything today.       Allergies:  Bee Venom  No Clinical Screening - See Comments  Apresoline Esidrix [Hydralazine-Hctz]  Seasonal Allergies     Medications:    Augmentin  Aspirin 325 mg  Epipen  Amaryl  Glucosamine-chondroitinoitin  Levothyroxine  Lisinopril-hydrochlorothiazide  Pravastatin  Psyllium  Raloxifene  Ranitidine  Sertraline  Trazodone  Valtrex    Past Medical History:    CVA  Diabetes  GERD  Hyperlipidemia  Hypertension  Thyroid nodule  Aphasic stroke  Anxiety  IBS  Depression    Past Surgical History:    Appendectomy  Left foot surgery  Coccygectomy  Hernia repair  Hysterectomy  Release trigger finger     Family History:    Stroke  CHF  Alcoholism  Diabetes  CAD    Social History:  Smoking status: former smoker  Alcohol use: yes, occasionally  Drug use: no  PCP: [facility]  Presents to the ED with friend  Marital Status:   [5]       Review of Systems   HENT: Positive for facial swelling. Negative for sore throat and trouble swallowing.    Skin: Positive for rash.   All other systems reviewed and are negative.        Physical Exam     Patient Vitals for the past 24 hrs:   BP Temp Temp src Pulse Heart Rate Resp SpO2 Weight   12/01/19 1600 139/79 -- -- 87 87 20 -- --   12/01/19 1545 (!)  145/76 -- -- 86 84 14 94 % --   12/01/19 1530 129/85 -- -- 82 84 21 96 % --   12/01/19 1515 130/76 -- -- 80 79 17 94 % --   12/01/19 1338 (!) 175/90 98.2  F (36.8  C) Oral -- 98 16 96 % 80.7 kg (178 lb)         Physical Exam  Constitutional:       Appearance: She is well-developed.   HENT:      Right Ear: Tympanic membrane and external ear normal.      Left Ear: Tympanic membrane and external ear normal.      Nose: Nose normal.      Mouth/Throat:      Mouth: Mucous membranes are moist.      Pharynx: Oropharynx is clear. No oropharyngeal exudate or posterior oropharyngeal erythema.      Comments: Diffuse lower lip swelling and lower chin swelling. No tongue or posterior oropharynx involvement  Eyes:      General: No scleral icterus.     Extraocular Movements: Extraocular movements intact.      Conjunctiva/sclera: Conjunctivae normal.      Pupils: Pupils are equal, round, and reactive to light.   Neck:      Musculoskeletal: Normal range of motion and neck supple.      Vascular: No JVD.   Cardiovascular:      Rate and Rhythm: Normal rate and regular rhythm.      Heart sounds: Normal heart sounds. No murmur. No friction rub. No gallop.    Pulmonary:      Effort: Pulmonary effort is normal. No respiratory distress.      Breath sounds: Normal breath sounds. No wheezing or rales.   Abdominal:      General: Bowel sounds are normal. There is no distension.      Palpations: Abdomen is soft. There is no mass.      Tenderness: There is no abdominal tenderness.   Musculoskeletal: Normal range of motion.   Lymphadenopathy:      Cervical: No cervical adenopathy.   Skin:     General: Skin is warm and dry.      Findings: Rash present.      Comments: B hand and wrist swollen with faint pink hue over both hands, no petechiae or other rash   Neurological:      Mental Status: She is alert.   Psychiatric:         Mood and Affect: Mood normal.         Emergency Department Course     Procedures:  None    Interventions:  1349 NS 1L IV  1349  Benadryl 50 mg IV  1349 Solu-medrol 125 mg IV  1405 Pepcid 20 mg IV    Emergency Department Course:  Past medical records, nursing notes, and vitals reviewed.    1336 I performed an exam of the patient as documented above.     The patient was placed on continuous cardiac and pulse ox monitoring.    IV was inserted and blood was drawn for laboratory testing, results above.    1435 Patient rechecked and updated. Hands are less swollen.    1521    Patient rechecked and updated. Lip swelling is still present, but better.     Findings and plan explained to the Patient. Patient discharged home with instructions regarding supportive care, medications, and reasons to return. The importance of close follow-up was reviewed.     I personally answered all related questions prior to discharge.      Impression & Plan     Medical Decision Making:  Norma F Alpers is a 85 year old female who presents to the emergency department today with lip swelling and bilateral hand swelling and itching. Patient is on lisinopril, however it is somewhat unusual to have skin manifestation in extremities with angioedema. Patient has no other symptoms. She received the above treatment and seemed to be doing better. The lip swelling is coming down, but is not completely gone. The itching and swelling is improving as well. She has not had any problem eating or swallowing, and there is no airway issues. Since she is doing better, we will observe her for another couple hours and see if symptoms do get better. If symptoms do improve, and she feels comfortable, she certainly can be discharged with follow up with her primary doctor. I discussed stopping lisinopril with her and suggest she see her doctor to start a different new medication. Dr. Sheikh will follow up and observe the patient.     Discharge Diagnosis:    ICD-10-CM    1. Angioedema, initial encounter T78.3XXA    2. Allergic reaction, initial encounter T78.40XA        Disposition:  Pending  observation      Scribe Disclosure:  I, Luli Alonzo, am serving as a scribe at 1:36 PM on 12/1/2019 to document services personally performed by Dee Jordan MD based on my observations and the provider's statements to me.     12/1/2019   [facility] EMERGENCY DEPARTMENT       Dee Jordan MD  12/01/19 [id number]

## 2019-12-01 NOTE — ED AVS SNAPSHOT
[facility] Emergency Department  [address]  [facility] [address]  Phone:  [phone]  Fax:  [phone]                                    Norma F Alpers   MRN: [id number]    Department:  [facility] Emergency Department   Date of Visit:  12/1/2019           After Visit Summary Signature Page    I have received my discharge instructions, and my questions have been answered. I have discussed any challenges I see with this plan with the nurse or doctor.    ..........................................................................................................................................  Patient/Patient Representative Signature      ..........................................................................................................................................  Patient Representative Print Name and Relationship to Patient    ..................................................               ................................................  Date                                   Time    ..........................................................................................................................................  Reviewed by Signature/Title    ...................................................              ..............................................  Date                                               Time          22EPIC Rev 08/18

## 2019-12-02 ENCOUNTER — OFFICE VISIT (OUTPATIENT)
Dept: INTERNAL MEDICINE | Facility: CLINIC | Age: 84
End: 2019-12-02
Payer: COMMERCIAL

## 2019-12-02 VITALS
HEIGHT: 65 IN | OXYGEN SATURATION: 97 % | TEMPERATURE: 97.5 F | DIASTOLIC BLOOD PRESSURE: 64 MMHG | SYSTOLIC BLOOD PRESSURE: 138 MMHG | WEIGHT: 183.5 LBS | HEART RATE: 95 BPM | BODY MASS INDEX: 30.57 KG/M2

## 2019-12-02 DIAGNOSIS — T78.3XXD ANGIOEDEMA, SUBSEQUENT ENCOUNTER: Primary | ICD-10-CM

## 2019-12-02 DIAGNOSIS — I10 BENIGN ESSENTIAL HYPERTENSION: ICD-10-CM

## 2019-12-02 PROCEDURE — 99214 OFFICE O/P EST MOD 30 MIN: CPT | Performed by: INTERNAL MEDICINE

## 2019-12-02 RX ORDER — HYDROCHLOROTHIAZIDE 25 MG/1
25 TABLET ORAL DAILY
Qty: 30 TABLET | Refills: 1 | Status: SHIPPED | OUTPATIENT
Start: 2019-12-02 | End: 2019-12-12

## 2019-12-02 ASSESSMENT — MIFFLIN-ST. JEOR: SCORE: 1270.29

## 2019-12-02 NOTE — PROGRESS NOTES
"Subjective     Norma F Alpers is a 85 year old female who presents to clinic today for the following health issues:    HPI   ED/UC Followup:    Facility:  [facility]  Date of visit: 12/01/2019  Reason for visit: swelling   Current Status: improved     Pt noticed swelling of her hands starting  day prior to ER visit, and she noticed her lower lip was swollen around 0900 on day of visit. She reports she has been itchy all night. She reports her throat and tongue feel fine, and denies trouble swallowing.  She reported no new food intake but is on ACE inhibitor for quite a long time.  It was felt she had a mild case of angioedema discharged on Benadryl and prednisone.  She is taken some Benadryl but never started the prednisone.    1 day later, today she states that swelling has essentially resolved and she feels fine.  She did not take her lisinopril hydrochlorothiazide this morning.            Reviewed and updated as needed this visit by Provider         Review of Systems   ROS COMP: Constitutional, HEENT, cardiovascular, pulmonary, gi and gu systems are negative, except as otherwise noted.      Objective    /64   Pulse 95   Temp 97.5  F (36.4  C) (Temporal)   Ht 1.638 m (5' 4.5\")   Wt 83.2 kg (183 lb 8 oz)   SpO2 97%   BMI 31.01 kg/m    Body mass index is 31.01 kg/m .  Physical Exam   GENERAL APPEARANCE: healthy, alert, no distress and over weight  HENT: No obvious lip swelling noted , nose and mouth without ulcers or lesions, oral mucous membranes moist, oropharynx clear and normal cephalic/atraumatic  NECK: no adenopathy, no asymmetry, masses, or scars and thyroid normal to palpation  RESP: lungs clear to auscultation - no rales, rhonchi or wheezes  CV: regular rates and rhythm    Labs reviewed in Epic        Assessment & Plan     1. Angioedema, subsequent encounter  Resolved 1 day later.  Never started prednisone would hold off on this.  Can use either cetirizine or Benadryl as " "needed.  Differential etiologies include food versus ACE inhibitor.  ACE inhibitor has been stopped and will have her stay off of this until she follows up with her PCP.  Use hydrochlorothiazide at a slightly higher dose to 25 mg instead of the previous combination.  Consider ARB as an alternative as I believe this was quite mild case with questionable other causes as well.    2. Benign essential hypertension  As above use hydrochlorthiazide alone at 25 mg follow-up 2 weeks with PCP  - hydrochlorothiazide (HYDRODIURIL) 25 MG tablet; Take 1 tablet (25 mg) by mouth daily  Dispense: 30 tablet; Refill: 1     BMI:   Estimated body mass index is 31.01 kg/m  as calculated from the following:    Height as of this encounter: 1.638 m (5' 4.5\").    Weight as of this encounter: 83.2 kg (183 lb 8 oz).           See Patient Instructions    Return in about 2 weeks (around 12/16/2019) for BP Recheck, Lab Work.    Ladarius Morales MD  [facility]        "

## 2019-12-03 ENCOUNTER — TELEPHONE (OUTPATIENT)
Dept: INTERNAL MEDICINE | Facility: CLINIC | Age: 84
End: 2019-12-03

## 2019-12-12 ENCOUNTER — OFFICE VISIT (OUTPATIENT)
Dept: INTERNAL MEDICINE | Facility: CLINIC | Age: 84
End: 2019-12-12
Payer: COMMERCIAL

## 2019-12-12 VITALS
HEART RATE: 80 BPM | BODY MASS INDEX: 31.02 KG/M2 | DIASTOLIC BLOOD PRESSURE: 70 MMHG | HEIGHT: 64 IN | SYSTOLIC BLOOD PRESSURE: 130 MMHG | RESPIRATION RATE: 16 BRPM

## 2019-12-12 DIAGNOSIS — K11.6 PAROTID CYST: ICD-10-CM

## 2019-12-12 DIAGNOSIS — T78.3XXD ANGIOEDEMA, SUBSEQUENT ENCOUNTER: Primary | ICD-10-CM

## 2019-12-12 DIAGNOSIS — E11.9 TYPE 2 DIABETES MELLITUS WITHOUT COMPLICATION, WITHOUT LONG-TERM CURRENT USE OF INSULIN (H): ICD-10-CM

## 2019-12-12 DIAGNOSIS — I10 BENIGN ESSENTIAL HYPERTENSION: ICD-10-CM

## 2019-12-12 PROCEDURE — 99214 OFFICE O/P EST MOD 30 MIN: CPT | Performed by: INTERNAL MEDICINE

## 2019-12-12 RX ORDER — GLIMEPIRIDE 1 MG/1
TABLET ORAL
Qty: 90 TABLET | Refills: 1 | Status: SHIPPED | OUTPATIENT
Start: 2019-12-12 | End: 2020-07-11

## 2019-12-12 RX ORDER — HYDROCHLOROTHIAZIDE 25 MG/1
25 TABLET ORAL DAILY
Qty: 90 TABLET | Refills: 1 | Status: SHIPPED | OUTPATIENT
Start: 2019-12-12 | End: 2020-01-09

## 2019-12-12 ASSESSMENT — ANXIETY QUESTIONNAIRES
1. FEELING NERVOUS, ANXIOUS, OR ON EDGE: SEVERAL DAYS
3. WORRYING TOO MUCH ABOUT DIFFERENT THINGS: SEVERAL DAYS
7. FEELING AFRAID AS IF SOMETHING AWFUL MIGHT HAPPEN: MORE THAN HALF THE DAYS
IF YOU CHECKED OFF ANY PROBLEMS ON THIS QUESTIONNAIRE, HOW DIFFICULT HAVE THESE PROBLEMS MADE IT FOR YOU TO DO YOUR WORK, TAKE CARE OF THINGS AT HOME, OR GET ALONG WITH OTHER PEOPLE: SOMEWHAT DIFFICULT
2. NOT BEING ABLE TO STOP OR CONTROL WORRYING: SEVERAL DAYS
5. BEING SO RESTLESS THAT IT IS HARD TO SIT STILL: SEVERAL DAYS
GAD7 TOTAL SCORE: 8
6. BECOMING EASILY ANNOYED OR IRRITABLE: SEVERAL DAYS

## 2019-12-12 ASSESSMENT — PATIENT HEALTH QUESTIONNAIRE - PHQ9: 5. POOR APPETITE OR OVEREATING: SEVERAL DAYS

## 2019-12-12 NOTE — PROGRESS NOTES
"  Dr Clemenet's note      Patient's instructions / PLAN:                                                        Plan:  1. Continue same meds, same doses for now   2. Follow up in January with Dr Kennedy     ASSESSMENT & PLAN:                                                      (T78.3XXD) Angioedema, subsequent encounter  (primary encounter diagnosis)  Comment: likely sec ACEI  Plan: obs    (E11.9) Type 2 diabetes mellitus without complication, without long-term current use of insulin (H)  Comment: Controlled    Plan: glimepiride (AMARYL) 1 MG tablet            (I10) Benign essential hypertension  Comment: Controlled    Plan: hydrochlorothiazide (HYDRODIURIL) 25 MG tablet            (K11.6) Parotid cyst  Comment:   Plan: f/u w ENT       Chief complaint:                                                      ER f/u     SUBJECTIVE:                                                    History of present illness:    Allergic reaction  -- on Dec 1 she presented to ER with swollen lips and hands  -- lisinopril was stopped and she was started on hydrochlorothiazide     R parotid nodule  -- saw JOSE CaliY  -- she was advised to suck on lemon   US Nov 2019 IMPRESSION:  Complex cystic lesion right parotid gland as described. No  associated color Doppler flow, however due to the internal complexity  follow-up with ENT suggested.    HTN  -- she doesn't check BP at home   -- low salt diet at home  -- urinates more since hydrochlorothiazide     ED/UC Followup:    Facility:  [facility]  Date of visit: 12/1/2019  Reason for visit: allergic reaction  Current Status: Edema resolved had a F/U with ENT, Dr. Aviles       Review of Systems:                                                      ROS: negative for fever, chills, cough, wheezes, chest pain, shortness of breath, vomiting, abdominal pain, leg swelling     OBJECTIVE:             Physical exam:   Blood pressure 130/70, pulse 80, resp. rate 16, height 1.638 m (5' 4.49\"), not currently " breastfeeding.     NAD, appears comfortable  Skin: no rashes   HEENT: PERRLA, EOMI, pink conjunctiva, anicteric sclerae, bilateral tympanic membranes are clinically normal, oropharynx is normal color. Soft parotid nodule on the L side   Neck: supple, no JVD,  No thyroidmegaly. Lymph nodes nonpalpable cervical and supraclavicular.  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    no residual - was aphasic for awhile after CVA      PSHx: reviewed  Past Surgical History:   Procedure Laterality Date     APPENDECTOMY      Ruptured --peritonitis     C NONSPECIFIC PROCEDURE      Left foot surgery     COCCYGECTOMY  1/2011    Zuleyka Vernell     HERNIA REPAIR      Abdomen     HYSTERECTOMY, PAP NO LONGER INDICATED  1997     RELEASE TRIGGER FINGER  4/2/2014    Procedure: RELEASE TRIGGER FINGER;  Release Trigger Thumb ;  Surgeon: Gurpreet Mast MD;  Location:  OR        Meds: reviewed  Current Outpatient Medications   Medication Sig Dispense Refill     aspirin 325 MG tablet Take  by mouth daily.       blood glucose monitoring (ACCU-CHEK ANTONY SMARTVIEW) meter device kit Use to test blood sugar 1-2 times daily or as directed.  Ok to substitute alternative if insurance prefers. 1 kit 0     blood glucose monitoring (ACCU-CHEK SMARTVIEW) test strip Use to test blood sugar 2-3 times daily or as directed. 100 strip prn     Calcium Carbonate (CALCIUM 600 PO) Take 1 tablet by mouth daily 2 times daily       cetirizine (ZYRTEC) 10 MG tablet Take 10 mg by mouth daily as  needed for allergies       EPINEPHrine (EPIPEN) 0.3 MG/0.3ML injection Inject 0.3 mLs (0.3 mg) into the muscle once as needed for anaphylaxis 2 each 5     glimepiride (AMARYL) 1 MG tablet TAKE 1 TABLET (1 MG) BY MOUTH EVERY MORNING (BEFORE BREAKFAST) 90 tablet 2     glucosamine-chondroitinoitin 750-600 MG TABS Take 1 tablet by mouth 2 times daily.         hydrochlorothiazide (HYDRODIURIL) 25 MG tablet Take 1 tablet (25 mg) by mouth daily 30 tablet 1     levothyroxine (SYNTHROID/LEVOTHROID) 75 MCG tablet Take 1 tablet (75 mcg) by mouth every morning 90 tablet 1     Multiple Vitamins-Minerals (PRESERVISION AREDS 2 PO) Take 1 tablet by mouth daily       omega-3 fatty acids (FISH OIL) 1200 MG capsule Take 1 capsule by mouth daily.       potassium chloride ER (KLOR-CON) 20 MEQ CR tablet TAKE 1 TABLET (20 MEQ) BY MOUTH 2 TIMES DAILY 180 tablet 4     pravastatin (PRAVACHOL) 80 MG tablet TAKE 1 TABLET (80 MG) BY MOUTH DAILY 90 tablet 4     psyllium (METAMUCIL) 58.6 % POWD Take by mouth daily       raloxifene (EVISTA) 60 MG tablet TAKE 1 TABLET BY MOUTH EVERY DAY *INS 90 tablet 4     ranitidine (ZANTAC) 150 MG tablet Take 1 tablet (150 mg) by mouth 2 times daily as needed for heartburn 60 tablet 5     sertraline (ZOLOFT) 100 MG tablet Take 1 tablet (100 mg) by mouth daily 90 tablet 1     thin (NO BRAND SPECIFIED) lancets 1 each daily 100 each 4     traZODone (DESYREL) 150 MG tablet Take 1 tablet (150 mg) by mouth At Bedtime 90 tablet 1     TURMERIC PO Take 2 tablets by mouth daily        vitamin B complex with vitamin C (VITAMIN  B COMPLEX) TABS tablet Take 1 tablet by mouth daily       diphenhydrAMINE (BENADRYL) 25 MG capsule Take 1-2 capsules (25-50 mg) by mouth every 6 hours as needed for itching or allergies (Patient not taking: Reported on 12/12/2019) 20 capsule 0     Multiple Vitamins-Minerals (MULTIVITAMIN ADULT) TABS Take 1 tablet by mouth daily       valACYclovir (VALTREX) 500 MG tablet TAKE 2 TABLET TWICE A DAY BY  MOUTH X 2 DAYS FOR FLARES.  0       Soc Hx: reviewed  Fam Hx: reviewed          Lavern Clemente MD  Internal Medicine

## 2019-12-13 ASSESSMENT — ANXIETY QUESTIONNAIRES: GAD7 TOTAL SCORE: 8

## 2020-01-09 ENCOUNTER — OFFICE VISIT (OUTPATIENT)
Dept: INTERNAL MEDICINE | Facility: CLINIC | Age: 85
End: 2020-01-09
Payer: COMMERCIAL

## 2020-01-09 VITALS
BODY MASS INDEX: 29.75 KG/M2 | TEMPERATURE: 99.1 F | SYSTOLIC BLOOD PRESSURE: 146 MMHG | RESPIRATION RATE: 20 BRPM | OXYGEN SATURATION: 95 % | DIASTOLIC BLOOD PRESSURE: 82 MMHG | HEART RATE: 118 BPM | WEIGHT: 176 LBS

## 2020-01-09 DIAGNOSIS — I10 BENIGN ESSENTIAL HYPERTENSION: Primary | ICD-10-CM

## 2020-01-09 PROCEDURE — 99214 OFFICE O/P EST MOD 30 MIN: CPT | Performed by: INTERNAL MEDICINE

## 2020-01-09 RX ORDER — AMLODIPINE BESYLATE 5 MG/1
5 TABLET ORAL DAILY
Qty: 30 TABLET | Refills: 1 | Status: SHIPPED | OUTPATIENT
Start: 2020-01-09 | End: 2020-01-31

## 2020-01-09 RX ORDER — HYDROCHLOROTHIAZIDE 25 MG/1
12.5 TABLET ORAL DAILY
Qty: 45 TABLET | Refills: 1 | Status: SHIPPED | OUTPATIENT
Start: 2020-01-09 | End: 2020-07-11

## 2020-01-09 NOTE — PROGRESS NOTES
Subjective     Norma F Alpers is a 85 year old female who presents to clinic today for the following health issues:    HPI   Hypertension Follow-up  She had angioedema little over a month ago.  She was seen in the ED.  She was seen in follow-up 2 times after that, the first time she was put on hydrochlorothiazide 25 mg which was an increase from her usual dose of 12.5 mg.  At her other follow-up, no change was made in her medication.  She reports her home blood pressure readings are sometimes getting up to 200.  She has had a little bit of headaches.  She is mostly using a wrist cuff.  She also complains she feels a little short of breath since stopping the lisinopril, this is particularly at night she will awaken and feel better after sitting up for a little bit and is able to go back to bed.  She has some mild dyspnea on exertion.  She feels a little bit of fast heartbeat when she is short of breath.   She complains she has been having some dizziness chlorothiazide.      Do you check your blood pressure regularly outside of the clinic? Yes     Are you following a low salt diet? Yes-sort of     Are your blood pressures ever more than 140 on the top number (systolic) OR more  Yes    than 90 on the bottom number (diastolic), for example 140/90? Yes      How many servings of fruits and vegetables do you eat daily?  0-1    On average, how many sweetened beverages do you drink each day (Examples: soda, juice, sweet tea, etc.  Do NOT count diet or artificially sweetened beverages)?   1    How many days per week do you miss taking your medication? 0    Patient Active Problem List   Diagnosis     Advanced directives, counseling/discussion     Benign essential hypertension     Hyperlipidemia LDL goal <100     GERD (gastroesophageal reflux disease)     Aphasic stroke     Osteopenia     Acquired hypothyroidism     Anxiety     Thyroid nodule     Type 2 diabetes mellitus without complication, without long-term current use of  insulin (H)     Irritable bowel syndrome with both constipation and diarrhea     Major depressive disorder with single episode, in partial remission (H)     Exudative age-related macular degeneration of right eye with inactive scar (H)     Current Outpatient Medications   Medication Sig Dispense Refill     amLODIPine (NORVASC) 5 MG tablet Take 1 tablet (5 mg) by mouth daily 30 tablet 1     aspirin 325 MG tablet Take  by mouth daily.       blood glucose monitoring (ACCU-CHEK ANTONY SMARTVIEW) meter device kit Use to test blood sugar 1-2 times daily or as directed.  Ok to substitute alternative if insurance prefers. 1 kit 0     blood glucose monitoring (ACCU-CHEK SMARTVIEW) test strip Use to test blood sugar 2-3 times daily or as directed. 100 strip prn     Calcium Carbonate (CALCIUM 600 PO) Take 1 tablet by mouth daily 2 times daily       cetirizine (ZYRTEC) 10 MG tablet Take 10 mg by mouth daily as needed for allergies       EPINEPHrine (EPIPEN) 0.3 MG/0.3ML injection Inject 0.3 mLs (0.3 mg) into the muscle once as needed for anaphylaxis 2 each 5     glimepiride (AMARYL) 1 MG tablet TAKE 1 TABLET (1 MG) BY MOUTH EVERY MORNING (BEFORE BREAKFAST) 90 tablet 1     glucosamine-chondroitinoitin 750-600 MG TABS Take 1 tablet by mouth 2 times daily.         hydrochlorothiazide (HYDRODIURIL) 25 MG tablet Take 0.5 tablets (12.5 mg) by mouth daily 45 tablet 1     levothyroxine (SYNTHROID/LEVOTHROID) 75 MCG tablet Take 1 tablet (75 mcg) by mouth every morning 90 tablet 1     Multiple Vitamins-Minerals (PRESERVISION AREDS 2 PO) Take 1 tablet by mouth daily       omega-3 fatty acids (FISH OIL) 1200 MG capsule Take 1 capsule by mouth daily.       potassium chloride ER (KLOR-CON) 20 MEQ CR tablet TAKE 1 TABLET (20 MEQ) BY MOUTH 2 TIMES DAILY 180 tablet 4     pravastatin (PRAVACHOL) 80 MG tablet TAKE 1 TABLET (80 MG) BY MOUTH DAILY 90 tablet 4     psyllium (METAMUCIL) 58.6 % POWD Take by mouth daily       raloxifene (EVISTA) 60 MG  tablet TAKE 1 TABLET BY MOUTH EVERY DAY *INS 90 tablet 4     ranitidine (ZANTAC) 150 MG tablet Take 1 tablet (150 mg) by mouth 2 times daily as needed for heartburn 60 tablet 5     sertraline (ZOLOFT) 100 MG tablet Take 1 tablet (100 mg) by mouth daily 90 tablet 1     thin (NO BRAND SPECIFIED) lancets 1 each daily 100 each 4     traZODone (DESYREL) 150 MG tablet Take 1 tablet (150 mg) by mouth At Bedtime 90 tablet 1     TURMERIC PO Take 2 tablets by mouth daily        valACYclovir (VALTREX) 500 MG tablet TAKE 2 TABLET TWICE A DAY BY MOUTH X 2 DAYS FOR FLARES.  0     vitamin B complex with vitamin C (VITAMIN  B COMPLEX) TABS tablet Take 1 tablet by mouth daily        Social History     Tobacco Use     Smoking status: Former Smoker     Packs/day: 0.00     Last attempt to quit: 3/27/1975     Years since quittin.8     Smokeless tobacco: Never Used   Substance Use Topics     Alcohol use: Yes     Comment: occasionally     Drug use: No            Reviewed and updated as needed this visit by Provider         Review of Systems   Dyspnea on exertion, palpitations as above          Objective    BP (!) 146/82   Pulse 118   Temp 99.1  F (37.3  C) (Oral)   Resp 20   Wt 79.8 kg (176 lb)   SpO2 95%   BMI 29.75 kg/m    Body mass index is 29.75 kg/m .  Physical Exam     Not examined           Assessment & Plan     1. Benign essential hypertension  Angioedema related to lisinopril, that medication has been discontinued.  She is going to need other medication.  She also is having side effects from the hydrochlorothiazide.  Recommend re-add amlodipine as she tolerated this in the past, it was stopped only because of lower blood pressure.  We will change her hydrochlorothiazide back to 12.5 mg.  The dyspnea may be related to some diastolic dysfunction, if not improved with improved blood pressure control and amlodipine, may consider repeating cardiac echo, considering carvedilol.    She is advised to call if any side effects  from the amlodipine, if she develops any increasing dyspnea.    - amLODIPine (NORVASC) 5 MG tablet; Take 1 tablet (5 mg) by mouth daily  Dispense: 30 tablet; Refill: 1  - hydrochlorothiazide (HYDRODIURIL) 25 MG tablet; Take 0.5 tablets (12.5 mg) by mouth daily  Dispense: 45 tablet; Refill: 1     20 minutes spent with the patient, >50% of time spent counseling about angioedema, her medications, amlodipine and potential side effects    Return in about 3 weeks (around 1/30/2020) for Nurse BP check.    Adelita Kennedy MD  [facility]
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
## 2020-01-13 NOTE — ADDENDUM NOTE
Encounter addended by: Zuleyka Mejía, OT on: 1/13/2020 5:29 PM   Actions taken: Clinical Note Signed, Episode resolved

## 2020-01-30 ENCOUNTER — OFFICE VISIT (OUTPATIENT)
Dept: NURSING | Facility: CLINIC | Age: 85
End: 2020-01-30
Payer: COMMERCIAL

## 2020-01-30 VITALS — DIASTOLIC BLOOD PRESSURE: 70 MMHG | SYSTOLIC BLOOD PRESSURE: 134 MMHG

## 2020-01-30 DIAGNOSIS — I10 BENIGN ESSENTIAL HYPERTENSION: Primary | ICD-10-CM

## 2020-01-30 PROCEDURE — 99207 ZZC NO CHARGE NURSE ONLY: CPT

## 2020-01-30 NOTE — Clinical Note
Please advise patient if she needs to do anything different. She wants refill  On Amlodapine and HCTZ

## 2020-01-31 DIAGNOSIS — I10 BENIGN ESSENTIAL HYPERTENSION: ICD-10-CM

## 2020-01-31 RX ORDER — AMLODIPINE BESYLATE 5 MG/1
5 TABLET ORAL DAILY
Qty: 90 TABLET | Refills: 1 | Status: SHIPPED | OUTPATIENT
Start: 2020-01-31 | End: 2020-07-11

## 2020-01-31 RX ORDER — AMLODIPINE BESYLATE 5 MG/1
TABLET ORAL
Qty: 30 TABLET | Refills: 1 | OUTPATIENT
Start: 2020-01-31

## 2020-01-31 NOTE — PROGRESS NOTES
Please advise patient her blood pressure is well controlled.  I would not recommend making any changes.  I sent the amlodipine with a 90-day supply.  Suggest she follow-up with me in 6 months.

## 2020-02-21 ENCOUNTER — TRANSFERRED RECORDS (OUTPATIENT)
Dept: HEALTH INFORMATION MANAGEMENT | Facility: CLINIC | Age: 85
End: 2020-02-21

## 2020-02-21 LAB — RETINOPATHY: NEGATIVE

## 2020-03-05 DIAGNOSIS — E78.5 HYPERLIPIDEMIA LDL GOAL <100: ICD-10-CM

## 2020-03-05 RX ORDER — PRAVASTATIN SODIUM 80 MG/1
TABLET ORAL
Qty: 90 TABLET | Refills: 1 | Status: SHIPPED | OUTPATIENT
Start: 2020-03-05 | End: 2020-07-11

## 2020-03-05 NOTE — TELEPHONE ENCOUNTER
"Requested Prescriptions   Pending Prescriptions Disp Refills     pravastatin (PRAVACHOL) 80 MG tablet [Pharmacy Med Name: PRAVASTATIN SODIUM 80 MG TAB] 90 tablet 4     Sig: TAKE 1 TABLET BY MOUTH EVERY DAY   Last Written Prescription Date:  03/04/2019  Last Fill Quantity: 90,  # refills: 04   Last office visit: 1/9/2020 with prescribing provider:     Future Office Visit:      [facility] Protocol Passed - 3/5/2020 12:26 AM        Passed - LDL on file in past 12 months     Recent Labs   Lab Test 09/20/19  0805   *             Passed - No abnormal creatine kinase in past 12 months     No lab results found.             Passed - Recent (12 mo) or future (30 days) visit within the authorizing provider's specialty     Patient has had an office visit with the authorizing provider or a provider within the authorizing providers department within the previous 12 mos or has a future within next 30 days. See \"Patient Info\" tab in inbasket, or \"Choose Columns\" in Meds & Orders section of the refill encounter.              Passed - Medication is active on med list        Passed - Patient is age 18 or older        Passed - No active pregnancy on record        Passed - No positive pregnancy test in past 12 months        "

## 2020-04-18 DIAGNOSIS — G47.00 INSOMNIA, UNSPECIFIED TYPE: ICD-10-CM

## 2020-04-20 RX ORDER — TRAZODONE HYDROCHLORIDE 150 MG/1
TABLET ORAL
Qty: 90 TABLET | Refills: 1 | Status: SHIPPED | OUTPATIENT
Start: 2020-04-20 | End: 2020-07-11

## 2020-04-20 NOTE — TELEPHONE ENCOUNTER
Pending Prescriptions:                       Disp   Refills    traZODone (DESYREL) 150 MG tablet [Pharma*90 tab*1            Sig: TAKE 1 TABLET BY MOUTH AT BEDTIME    Prescription approved per [facility] Refill Protocol.

## 2020-05-26 ENCOUNTER — HOSPITAL ENCOUNTER (OUTPATIENT)
Dept: MAMMOGRAPHY | Facility: CLINIC | Age: 85
Discharge: HOME | End: 2020-05-26
Attending: INTERNAL MEDICINE | Admitting: INTERNAL MEDICINE
Payer: COMMERCIAL

## 2020-05-26 DIAGNOSIS — Z12.31 VISIT FOR SCREENING MAMMOGRAM: ICD-10-CM

## 2020-05-26 PROCEDURE — 77067 SCR MAMMO BI INCL CAD: CPT

## 2020-05-28 ENCOUNTER — NURSE TRIAGE (OUTPATIENT)
Dept: INTERNAL MEDICINE | Facility: CLINIC | Age: 85
End: 2020-05-28

## 2020-05-28 NOTE — TELEPHONE ENCOUNTER
"Spoke with patient.  C/o shortness of breath x 2+ months \"most of the time\", intermittent bilateral ankle edema x 2+ months (she thinks).  When elevates her legs, edema diminishes.  Her son saw her on Memorial Day and he is the one who noticed the ankle edema and shortness of breath.  He is concerned.  Patient scheduled a telephone visit with Dr. Kennedy [phone] @ 1:20p.    Advised patient if symptoms get worse, painful breathing, cannot catch her breath, etc, call 911.  Patient in agreement.      Additional Information    Negative: Breathing stopped and hasn't returned    Negative: Choking on something    Negative: SEVERE difficulty breathing (e.g., struggling for each breath, speaks in single words, pulse > 120)    Negative: Bluish (or gray) lips or face    Negative: Difficult to awaken or acting confused (e.g., disoriented, slurred speech)    Negative: Passed out (i.e., fainted, collapsed and was not responding)    Negative: Wheezing started suddenly after medicine, an allergic food, or bee sting    Negative: Stridor    Negative: Slow, shallow and weak breathing    Negative: Sounds like a life-threatening emergency to the triager    Negative: Chest pain    Negative: Wheezing (high pitched whistling sound) and previous asthma attacks or use of asthma medicines    Negative: Difficulty breathing and only present when coughing    Negative: Difficulty breathing and only from stuffy or runny nose    Negative: MODERATE difficulty breathing (e.g., speaks in phrases, SOB even at rest, pulse 100-120) of new onset or worse than normal    Negative: Wheezing can be heard across the room    Negative: Drooling or spitting out saliva (because can't swallow)    Negative: Any history of prior \"blood clot\" in leg or lungs    Negative: Recent illness requiring prolonged bedrest (i.e., immobilization)    Negative: Hip or leg fracture in past 2 months (e.g., or had cast on leg or ankle)    Negative: Major surgery in the past month    " "Negative: Recent long-distance travel with prolonged time in car, bus, plane, or train (i.e., within past 2 weeks; 6 or  more hours duration)    Negative: Extra heart beats OR irregular heart beating   (i.e., \"palpitations\")    Negative: Fever > 103 F (39.4 C)    Negative: Fever > 101 F (38.3 C) and over 60 years of age    Negative: Fever > 100.0 F (37.8 C) and bedridden (e.g., nursing home patient, stroke, chronic illness, recovering from surgery)    Negative: Fever > 100.0 F (37.8 C) and diabetes mellitus or weak immune system (e.g., HIV positive, cancer chemo, splenectomy, organ transplant, chronic steroids)    Negative: Periods where breathing stops and then resumes normally and bedridden (e.g., nursing home patient, CVA)    Negative: Pregnant or postpartum (from 0 to 6 weeks after delivery)    Negative: Patient sounds very sick or weak to the triager    MILD difficulty breathing (e.g., minimal/no SOB at rest, SOB with walking, pulse < 100) of new onset or worse than normal    Answer Assessment - Initial Assessment Questions  1. RESPIRATORY STATUS: \"Describe your breathing?\" (e.g., wheezing, shortness of breath, unable to speak, severe coughing)       C/o intermittent shortness of breath x 2 months--\"most of the time\".   2. ONSET: \"When did this breathing problem begin?\"       Per patient, started x 2 months ago.  3. PATTERN \"Does the difficult breathing come and go, or has it been constant since it started?\"       States its constant.  4. SEVERITY: \"How bad is your breathing?\" (e.g., mild, moderate, severe)     - MILD: No SOB at rest, mild SOB with walking, speaks normally in sentences, can lay down, no retractions, pulse < 100.     - MODERATE: SOB at rest, SOB with minimal exertion and prefers to sit, cannot lie down flat, speaks in phrases, mild retractions, audible wheezing, pulse 100-120.     - SEVERE: Very SOB at rest, speaks in single words, struggling to breathe, sitting hunched forward, retractions, " "pulse > 120       States its mostly mild shortness of breath.  And can get short of breath with talking.  5. RECURRENT SYMPTOM: \"Have you had difficulty breathing before?\" If so, ask: \"When was the last time?\" and \"What happened that time?\"       States she has had this before.  Was referred to Dr. Felder years ago for this issue.  6. CARDIAC HISTORY: \"Do you have any history of heart disease?\" (e.g., heart attack, angina, bypass surgery, angioplasty)       States she has no cardiac history.  7. LUNG HISTORY: \"Do you have any history of lung disease?\"  (e.g., pulmonary embolus, asthma, emphysema)      Patient states she was told at one time she has asthma.  8. CAUSE: \"What do you think is causing the breathing problem?\"       Patient is unsure what is causing her shortness of breath.  9. OTHER SYMPTOMS: \"Do you have any other symptoms? (e.g., dizziness, runny nose, cough, chest pain, fever)      States she has intermittent dizziness when gets an injection in her eye for macular degeneration.  States she does have bilateral ankle edema when walks x 2+  months.  When she elevates her legs, swelling decreases to nothing.  Does not have much stamina--easily gets fatigued.  Also c/o mid-chest pain intermittently x \"years\".  Thinks that is from GERD.  10. PREGNANCY: \"Is there any chance you are pregnant?\" \"When was your last menstrual period?\"        NA  11. TRAVEL: \"Have you traveled out of the country in the last month?\" (e.g., travel history, exposures)        Travel history negative.  Has not been out of her building in months except to go to the [facility] doctor last week.    Protocols used: BREATHING DIFFICULTY-A-OH    "

## 2020-05-28 NOTE — TELEPHONE ENCOUNTER
Patient wants to see Dr Schmitt again.  Her son thinks she is having heart issues  Patient states she has a lot of swelling in her legs  She can walk 5 feet and she is out of breath.  She can not go grocery shopping without a motorized cart  No chest pain. Patient has had heart issue in the past.  Can she wait to be seen in clinic?  Please advise  Ok to call and byron [phone]

## 2020-05-29 ENCOUNTER — VIRTUAL VISIT (OUTPATIENT)
Dept: INTERNAL MEDICINE | Facility: CLINIC | Age: 85
End: 2020-05-29
Payer: COMMERCIAL

## 2020-05-29 DIAGNOSIS — R06.09 DOE (DYSPNEA ON EXERTION): Primary | ICD-10-CM

## 2020-05-29 PROCEDURE — 99214 OFFICE O/P EST MOD 30 MIN: CPT | Mod: 95 | Performed by: INTERNAL MEDICINE

## 2020-05-29 ASSESSMENT — ANXIETY QUESTIONNAIRES
GAD7 TOTAL SCORE: 8
3. WORRYING TOO MUCH ABOUT DIFFERENT THINGS: NEARLY EVERY DAY
5. BEING SO RESTLESS THAT IT IS HARD TO SIT STILL: NOT AT ALL
7. FEELING AFRAID AS IF SOMETHING AWFUL MIGHT HAPPEN: SEVERAL DAYS
IF YOU CHECKED OFF ANY PROBLEMS ON THIS QUESTIONNAIRE, HOW DIFFICULT HAVE THESE PROBLEMS MADE IT FOR YOU TO DO YOUR WORK, TAKE CARE OF THINGS AT HOME, OR GET ALONG WITH OTHER PEOPLE: SOMEWHAT DIFFICULT
1. FEELING NERVOUS, ANXIOUS, OR ON EDGE: SEVERAL DAYS
6. BECOMING EASILY ANNOYED OR IRRITABLE: NOT AT ALL
2. NOT BEING ABLE TO STOP OR CONTROL WORRYING: NEARLY EVERY DAY

## 2020-05-29 ASSESSMENT — PATIENT HEALTH QUESTIONNAIRE - PHQ9
5. POOR APPETITE OR OVEREATING: NOT AT ALL
SUM OF ALL RESPONSES TO PHQ QUESTIONS 1-9: 6

## 2020-05-29 NOTE — PROGRESS NOTES
"Norma F Alpers is a 85 year old female who is being evaluated via a billable telephone visit.      The patient has been notified of following:     \"This telephone visit will be conducted via a call between you and your physician/provider. We have found that certain health care needs can be provided without the need for a physical exam.  This service lets us provide the care you need with a short phone conversation.  If a prescription is necessary we can send it directly to your pharmacy.  If lab work is needed we can place an order for that and you can then stop by our lab to have the test done at a later time.    Telephone visits are billed at different rates depending on your insurance coverage. During this emergency period, for some insurers they may be billed the same as an in-person visit.  Please reach out to your insurance provider with any questions.    If during the course of the call the physician/provider feels a telephone visit is not appropriate, you will not be charged for this service.\"    Patient has given verbal consent for Telephone visit?  Yes    What phone number would you like to be contacted at? [phone]    How would you like to obtain your AVS? Nelsy    Subjective     Norma F Alpers is a 85 year old female who presents via phone visit today for the following health issues:    HPI    Complaints of significant new dyspnea on exertion: In January, she had mentioned noticing a mild change in her breathing.  This was shortly after an episode of angioedema and her lisinopril had been discontinued.  Her blood pressure was a little bit higher than we thought it was possible the dyspnea was related to the elevated blood pressure and change in the medication.  Her blood pressure did improve but over the past 2 months she reports significant worsening of the dyspnea on exertion.  She finds that it is difficult to walk relatively short distances without stopping and resting.  It is not bothering her " at rest or affecting her as she is trying to get dressed but it is a noticeable change and seems to be slowly progressive.  She has not had PND.  She reports that her son noticed that her ankle seemed mildly swollen a few days ago.  She was not really aware of this and is not having any discomfort in the feet.  She does feel like her heart is beating faster with the exertion but it does slow down gradually at rest.  She has not had any chest pain or pressure or tightness.  She has not had cough, wheezing.    Patient Active Problem List   Diagnosis     Advanced directives, counseling/discussion     Benign essential hypertension     Hyperlipidemia LDL goal <100     GERD (gastroesophageal reflux disease)     Aphasic stroke     Osteopenia     Acquired hypothyroidism     Anxiety     Thyroid nodule     Type 2 diabetes mellitus without complication, without long-term current use of insulin (H)     Irritable bowel syndrome with both constipation and diarrhea     Major depressive disorder with single episode, in partial remission (H)     Exudative age-related macular degeneration of right eye with inactive scar (H)     Current Outpatient Medications   Medication Sig Dispense Refill     amLODIPine (NORVASC) 5 MG tablet Take 1 tablet (5 mg) by mouth daily 90 tablet 1     aspirin 325 MG tablet Take  by mouth daily.       Calcium Carbonate (CALCIUM 600 PO) Take 1 tablet by mouth daily 2 times daily       EPINEPHrine (EPIPEN) 0.3 MG/0.3ML injection Inject 0.3 mLs (0.3 mg) into the muscle once as needed for anaphylaxis 2 each 5     glimepiride (AMARYL) 1 MG tablet TAKE 1 TABLET (1 MG) BY MOUTH EVERY MORNING (BEFORE BREAKFAST) 90 tablet 1     glucosamine-chondroitinoitin 750-600 MG TABS Take 1 tablet by mouth 2 times daily.         hydrochlorothiazide (HYDRODIURIL) 25 MG tablet Take 0.5 tablets (12.5 mg) by mouth daily 45 tablet 1     levothyroxine (SYNTHROID/LEVOTHROID) 75 MCG tablet Take 1 tablet (75 mcg) by mouth every morning  90 tablet 1     Multiple Vitamins-Minerals (PRESERVISION AREDS 2 PO) Take 1 tablet by mouth daily       omega-3 fatty acids (FISH OIL) 1200 MG capsule Take 1 capsule by mouth daily.       potassium chloride ER (KLOR-CON) 20 MEQ CR tablet TAKE 1 TABLET (20 MEQ) BY MOUTH 2 TIMES DAILY 180 tablet 2     pravastatin (PRAVACHOL) 80 MG tablet TAKE 1 TABLET BY MOUTH EVERY DAY 90 tablet 1     psyllium (METAMUCIL) 58.6 % POWD Take by mouth daily       raloxifene (EVISTA) 60 MG tablet TAKE 1 TABLET BY MOUTH EVERY DAY 90 tablet 2     sertraline (ZOLOFT) 100 MG tablet Take 1 tablet (100 mg) by mouth daily 90 tablet 1     traZODone (DESYREL) 150 MG tablet TAKE 1 TABLET BY MOUTH AT BEDTIME 90 tablet 1     TURMERIC PO Take 2 tablets by mouth daily        vitamin B complex with vitamin C (VITAMIN  B COMPLEX) TABS tablet Take 1 tablet by mouth daily       cetirizine (ZYRTEC) 10 MG tablet Take 10 mg by mouth daily as needed for allergies       valACYclovir (VALTREX) 500 MG tablet TAKE 2 TABLET TWICE A DAY BY MOUTH X 2 DAYS FOR FLARES.  0      Social History     Tobacco Use     Smoking status: Former Smoker     Packs/day: 0.00     Last attempt to quit: 3/27/1975     Years since quittin.2     Smokeless tobacco: Never Used   Substance Use Topics     Alcohol use: Yes     Comment: occasionally     Drug use: No          Reviewed and updated as needed this visit by Provider         Review of Systems   No fever, chills, lightheadedness or syncope, chest pain, tightness, wheezing, cough, irregular heartbeats       Objective   Reported vitals:  There were no vitals taken for this visit.     PSYCH: Alert and oriented times 3; coherent speech, normal   rate and volume, able to articulate logical thoughts, able   to abstract reason, no tangential thoughts, no hallucinations   or delusions  Her affect is normal  RESP: No cough, no audible wheezing, able to talk in full sentences  Remainder of exam unable to be completed due to telephone  visits            Assessment/Plan:  1. LEWIS (dyspnea on exertion)  Advised that I am concerned that the symptoms are more likely to be cardiac, could represent some heart failure and/or artery disease.  This has been progressive since her visit in January so not likely related to elevated blood pressure.  Recommend stress testing so we can evaluate for artery disease as well as her cardiac function and we are likely to be able to get a rhythm assessment at the same time.  The edema may be related to that or could be because of the amlodipine.  Recommend she minimize activity until we can schedule the stress test.  Advised if she has severe dyspnea, chest pain or other symptoms, she present to the ED  - NM Lexiscan stress test (nuc card); Future    No follow-ups on file.      Phone call duration:  16 minutes    Adelita Kennedy MD

## 2020-05-30 ASSESSMENT — ANXIETY QUESTIONNAIRES: GAD7 TOTAL SCORE: 8

## 2020-06-09 ENCOUNTER — TELEPHONE (OUTPATIENT)
Dept: INTERNAL MEDICINE | Facility: CLINIC | Age: 85
End: 2020-06-09

## 2020-06-18 ENCOUNTER — HOSPITAL ENCOUNTER (OUTPATIENT)
Dept: NUCLEAR MEDICINE | Facility: CLINIC | Age: 85
Setting detail: NUCLEAR MEDICINE
End: 2020-06-18
Attending: INTERNAL MEDICINE
Payer: COMMERCIAL

## 2020-06-18 ENCOUNTER — HOSPITAL ENCOUNTER (OUTPATIENT)
Dept: CARDIOLOGY | Facility: CLINIC | Age: 85
End: 2020-06-18
Attending: INTERNAL MEDICINE
Payer: COMMERCIAL

## 2020-06-18 DIAGNOSIS — R06.09 DOE (DYSPNEA ON EXERTION): ICD-10-CM

## 2020-06-18 LAB
CV STRESS MAX HR HE: 88
RATE PRESSURE PRODUCT: NORMAL
STRESS ECHO BASELINE DIASTOLIC HE: 90
STRESS ECHO BASELINE HR: 75
STRESS ECHO BASELINE SYSTOLIC BP: 184
STRESS ECHO CALCULATED PERCENT HR: 65 %
STRESS ECHO LAST STRESS DIASTOLIC BP: 80
STRESS ECHO LAST STRESS SYSTOLIC BP: 156
STRESS ECHO TARGET HR: 135
STRESS/REST PERFUSION RATIO: 0.96

## 2020-06-18 PROCEDURE — 34300033 ZZH RX 343: Performed by: INTERNAL MEDICINE

## 2020-06-18 PROCEDURE — 25000128 H RX IP 250 OP 636

## 2020-06-18 PROCEDURE — 93018 CV STRESS TEST I&R ONLY: CPT | Performed by: INTERNAL MEDICINE

## 2020-06-18 PROCEDURE — 78452 HT MUSCLE IMAGE SPECT MULT: CPT

## 2020-06-18 PROCEDURE — 78452 HT MUSCLE IMAGE SPECT MULT: CPT | Mod: 26 | Performed by: INTERNAL MEDICINE

## 2020-06-18 PROCEDURE — A9502 TC99M TETROFOSMIN: HCPCS | Performed by: INTERNAL MEDICINE

## 2020-06-18 PROCEDURE — 93016 CV STRESS TEST SUPVJ ONLY: CPT | Performed by: INTERNAL MEDICINE

## 2020-06-18 PROCEDURE — 93017 CV STRESS TEST TRACING ONLY: CPT

## 2020-06-18 RX ORDER — AMINOPHYLLINE 25 MG/ML
INJECTION, SOLUTION INTRAVENOUS
Status: DISCONTINUED
Start: 2020-06-18 | End: 2020-06-18 | Stop reason: WASHOUT

## 2020-06-18 RX ORDER — REGADENOSON 0.08 MG/ML
INJECTION, SOLUTION INTRAVENOUS
Status: COMPLETED
Start: 2020-06-18 | End: 2020-06-18

## 2020-06-18 RX ADMIN — REGADENOSON 0.4 MG: 0.08 INJECTION, SOLUTION INTRAVENOUS at 10:02

## 2020-06-18 RX ADMIN — TETROFOSMIN 11 MCI.: 1.38 INJECTION, POWDER, LYOPHILIZED, FOR SOLUTION INTRAVENOUS at 08:25

## 2020-06-18 RX ADMIN — TETROFOSMIN 33 MCI.: 1.38 INJECTION, POWDER, LYOPHILIZED, FOR SOLUTION INTRAVENOUS at 10:10

## 2020-07-01 ENCOUNTER — ANCILLARY PROCEDURE (OUTPATIENT)
Dept: GENERAL RADIOLOGY | Facility: CLINIC | Age: 85
End: 2020-07-01
Attending: INTERNAL MEDICINE
Payer: COMMERCIAL

## 2020-07-01 DIAGNOSIS — R06.09 DOE (DYSPNEA ON EXERTION): ICD-10-CM

## 2020-07-01 PROCEDURE — 71046 X-RAY EXAM CHEST 2 VIEWS: CPT

## 2020-07-03 ENCOUNTER — TELEPHONE (OUTPATIENT)
Dept: INTERNAL MEDICINE | Facility: CLINIC | Age: 85
End: 2020-07-03

## 2020-07-03 DIAGNOSIS — E03.9 ACQUIRED HYPOTHYROIDISM: ICD-10-CM

## 2020-07-03 DIAGNOSIS — E11.9 TYPE 2 DIABETES MELLITUS WITHOUT COMPLICATION, WITHOUT LONG-TERM CURRENT USE OF INSULIN (H): Primary | ICD-10-CM

## 2020-07-03 DIAGNOSIS — R06.09 DYSPNEA ON EXERTION: ICD-10-CM

## 2020-07-03 NOTE — TELEPHONE ENCOUNTER
You can advise her that her chest x-ray is clear.  She is overdue for diabetes lab and follow-up, I would like her to schedule her lab and I am going to do a few extra labs to see if there is any anemia, thyroid problem that could explain some shortness of breath and then suggest she see me in the clinic about a week later.

## 2020-07-07 ENCOUNTER — TELEPHONE (OUTPATIENT)
Dept: INTERNAL MEDICINE | Facility: CLINIC | Age: 85
End: 2020-07-07

## 2020-07-07 DIAGNOSIS — R06.09 DYSPNEA ON EXERTION: ICD-10-CM

## 2020-07-07 DIAGNOSIS — E03.9 ACQUIRED HYPOTHYROIDISM: ICD-10-CM

## 2020-07-07 DIAGNOSIS — E11.9 TYPE 2 DIABETES MELLITUS WITHOUT COMPLICATION, WITHOUT LONG-TERM CURRENT USE OF INSULIN (H): ICD-10-CM

## 2020-07-07 LAB
HBA1C MFR BLD: 6.7 % (ref 0–5.6)
HGB BLD-MCNC: 14.8 G/DL (ref 11.7–15.7)

## 2020-07-07 PROCEDURE — 80048 BASIC METABOLIC PNL TOTAL CA: CPT | Performed by: INTERNAL MEDICINE

## 2020-07-07 PROCEDURE — 84443 ASSAY THYROID STIM HORMONE: CPT | Performed by: INTERNAL MEDICINE

## 2020-07-07 PROCEDURE — 85018 HEMOGLOBIN: CPT | Performed by: INTERNAL MEDICINE

## 2020-07-07 PROCEDURE — 83036 HEMOGLOBIN GLYCOSYLATED A1C: CPT | Performed by: INTERNAL MEDICINE

## 2020-07-07 PROCEDURE — 36415 COLL VENOUS BLD VENIPUNCTURE: CPT | Performed by: INTERNAL MEDICINE

## 2020-07-07 NOTE — TELEPHONE ENCOUNTER
Patient presents to lab at 1:50 pm after she reporst falling outside of this clinic just moments before. Writer asked to further triage.    Patient reports she lost her footing on the side walk, causing her to fall on her left side. Patient presented with bleeding on the left side of her side above left ear. Patient had a quarter-sized lump about two inches above her left ear with a small abrasion noted in the middle of the lump. Patient also had what appeared to be an abrasion from her glasses above her left ear that is about an 1 1/2 long. Bleeding had stopped by the time writer assessed patient.      Vitals during assessment:  Blood pressure: 143/83  Heart rate: 94  O2 sats on room air: 97 %  Respirations: 16    Patient does not appear to be in distress. Patient is alert and oriented to self, place, time, and situation. Patient denies loosing consciousness. Patient denies vision changes, headache, weakness, vomiting, pain, or confusion. Patient reports she takes aspirin 325 mg daily, but otherwise is not on any other blood thinners. No injuries noted to left knee or left leg. Patient felt that her walking was at baseline.    Writer advised Dr. Schmitt of assessment above. Dr. Schmitt also came to assess patient face to face, and felt that it was OK for patient to drive herself home. Dr. Schmitt advised patient should hold aspirin for two days, and call if new symptoms occur, or with further concerns. Patient verbalized understanding and agreed to plan.    Writer walked patient out to car. Patient verbalized to writer she felt safe to drive herself home.

## 2020-07-08 LAB
ANION GAP SERPL CALCULATED.3IONS-SCNC: 6 MMOL/L (ref 3–14)
BUN SERPL-MCNC: 13 MG/DL (ref 7–30)
CALCIUM SERPL-MCNC: 9.4 MG/DL (ref 8.5–10.1)
CHLORIDE SERPL-SCNC: 103 MMOL/L (ref 94–109)
CO2 SERPL-SCNC: 28 MMOL/L (ref 20–32)
CREAT SERPL-MCNC: 0.65 MG/DL (ref 0.52–1.04)
GFR SERPL CREATININE-BSD FRML MDRD: 80 ML/MIN/{1.73_M2}
GLUCOSE SERPL-MCNC: 131 MG/DL (ref 70–99)
POTASSIUM SERPL-SCNC: 3.8 MMOL/L (ref 3.4–5.3)
SODIUM SERPL-SCNC: 137 MMOL/L (ref 133–144)
TSH SERPL DL<=0.005 MIU/L-ACNC: 1.58 MU/L (ref 0.4–4)

## 2020-07-10 ENCOUNTER — OFFICE VISIT (OUTPATIENT)
Dept: INTERNAL MEDICINE | Facility: CLINIC | Age: 85
End: 2020-07-10
Payer: COMMERCIAL

## 2020-07-10 VITALS
HEART RATE: 99 BPM | SYSTOLIC BLOOD PRESSURE: 140 MMHG | BODY MASS INDEX: 28.61 KG/M2 | OXYGEN SATURATION: 95 % | WEIGHT: 178 LBS | HEIGHT: 66 IN | DIASTOLIC BLOOD PRESSURE: 72 MMHG | TEMPERATURE: 98.3 F | RESPIRATION RATE: 12 BRPM

## 2020-07-10 DIAGNOSIS — I10 BENIGN ESSENTIAL HYPERTENSION: ICD-10-CM

## 2020-07-10 DIAGNOSIS — G47.00 INSOMNIA, UNSPECIFIED TYPE: ICD-10-CM

## 2020-07-10 DIAGNOSIS — H35.3213 EXUDATIVE AGE-RELATED MACULAR DEGENERATION OF RIGHT EYE WITH INACTIVE SCAR (H): ICD-10-CM

## 2020-07-10 DIAGNOSIS — E03.9 ACQUIRED HYPOTHYROIDISM: ICD-10-CM

## 2020-07-10 DIAGNOSIS — E78.5 HYPERLIPIDEMIA LDL GOAL <100: ICD-10-CM

## 2020-07-10 DIAGNOSIS — E11.9 TYPE 2 DIABETES MELLITUS WITHOUT COMPLICATION, WITHOUT LONG-TERM CURRENT USE OF INSULIN (H): Primary | ICD-10-CM

## 2020-07-10 DIAGNOSIS — F41.9 ANXIETY: ICD-10-CM

## 2020-07-10 DIAGNOSIS — F32.4 MAJOR DEPRESSIVE DISORDER WITH SINGLE EPISODE, IN PARTIAL REMISSION (H): ICD-10-CM

## 2020-07-10 DIAGNOSIS — R06.09 DYSPNEA ON EXERTION: ICD-10-CM

## 2020-07-10 LAB
FEF 25/75: NORMAL
FEV-1: NORMAL
FEV1/FVC: NORMAL
FVC: NORMAL

## 2020-07-10 PROCEDURE — 94010 BREATHING CAPACITY TEST: CPT | Performed by: INTERNAL MEDICINE

## 2020-07-10 PROCEDURE — 99214 OFFICE O/P EST MOD 30 MIN: CPT | Mod: 25 | Performed by: INTERNAL MEDICINE

## 2020-07-10 RX ORDER — ALBUTEROL SULFATE 90 UG/1
2 AEROSOL, METERED RESPIRATORY (INHALATION) EVERY 6 HOURS
Qty: 1 INHALER | Refills: 11 | Status: SHIPPED | OUTPATIENT
Start: 2020-07-10

## 2020-07-10 ASSESSMENT — MIFFLIN-ST. JEOR: SCORE: 1261.21

## 2020-07-10 NOTE — PROGRESS NOTES
Subjective     Norma F Alpers is a 85 year old female who presents to clinic today for the following health issues:    HPI       Diabetes Follow-up      How often are you checking your blood sugar? Not at all recently    What concerns do you have today about your diabetes? None     Do you have any of these symptoms? (Select all that apply)  Numbness in feet and Burning in feet        Hyperlipidemia Follow-Up      Are you regularly taking any medication or supplement to lower your cholesterol?   Yes- pravastatin    Are you having muscle aches or other side effects that you think could be caused by your cholesterol lowering medication?  No    Hypertension Follow-up      Do you check your blood pressure regularly outside of the clinic? No     Are you following a low salt diet? Yes    Are your blood pressures ever more than 140 on the top number (systolic) OR more   than 90 on the bottom number (diastolic), for example 140/90? No    BP Readings from Last 2 Encounters:   01/30/20 134/70   01/09/20 (!) 146/82     Hemoglobin A1C (%)   Date Value   07/07/2020 6.7 (H)   09/20/2019 6.4 (H)     LDL Cholesterol Calculated (mg/dL)   Date Value   09/20/2019 111 (H)   10/09/2018 105 (H)         How many servings of fruits and vegetables do you eat daily?  2-3    On average, how many sweetened beverages do you drink each day (Examples: soda, juice, sweet tea, etc.  Do NOT count diet or artificially sweetened beverages)?   0    How many days per week do you exercise enough to make your heart beat faster? 3 or less    How many minutes a day do you exercise enough to make your heart beat faster? 9 or less    How many days per week do you miss taking your medication? 0      Other problems:   1.  Hypothyroidism: Stable without symptoms  2.  Depression and anxiety: Well-controlled  3.  Macular degeneration: Overall stable, last eye check was in 2/20 will be checking again next month,       Current concerns:   Dyspnea on exertion: We had  done a virtual visit about 6 weeks ago and she had a stress test showing no evidence of ischemia and good ejection fraction.  A recent chest x-ray was normal.  She reports her symptoms are still stable from the virtual visit.  She has noted that it might be a little worse with humidity and higher heat.  She does hear some wheezing noises herself.  In the distant past she had used an albuterol inhaler which did seem to help but that inhaler is gone.  She is wondering if she should try an over-the-counter inhaler.  Her daughter has some type of inhaler with the powder and she did try that once when she felt a little short of breath and a little crowded/anxious, may have helped a little bit.  She has not had any cough, no PND, orthopnea    She also mentions she has a cyst on her labia that is irritated.  Wondering about having that removed.    Patient Active Problem List   Diagnosis     Advanced directives, counseling/discussion     Benign essential hypertension     Hyperlipidemia LDL goal <100     GERD (gastroesophageal reflux disease)     Aphasic stroke     Osteopenia     Acquired hypothyroidism     Anxiety     Thyroid nodule     Type 2 diabetes mellitus without complication, without long-term current use of insulin (H)     Irritable bowel syndrome with both constipation and diarrhea     Major depressive disorder with single episode, in partial remission (H)     Exudative age-related macular degeneration of right eye with inactive scar (H)       Current Outpatient Medications   Medication Sig Dispense Refill     albuterol (PROAIR HFA/PROVENTIL HFA/VENTOLIN HFA) 108 (90 Base) MCG/ACT inhaler Inhale 2 puffs into the lungs every 6 hours 1 Inhaler 11     amLODIPine (NORVASC) 5 MG tablet Take 1 tablet (5 mg) by mouth daily 90 tablet 1     aspirin 325 MG tablet Take  by mouth daily.       Calcium Carbonate (CALCIUM 600 PO) Take 1 tablet by mouth daily 2 times daily       cetirizine (ZYRTEC) 10 MG tablet Take 10 mg by mouth  daily as needed for allergies       EPINEPHrine (EPIPEN) 0.3 MG/0.3ML injection Inject 0.3 mLs (0.3 mg) into the muscle once as needed for anaphylaxis 2 each 5     glimepiride (AMARYL) 1 MG tablet TAKE 1 TABLET (1 MG) BY MOUTH EVERY MORNING (BEFORE BREAKFAST) 90 tablet 1     glucosamine-chondroitinoitin 750-600 MG TABS Take 1 tablet by mouth 2 times daily.         hydrochlorothiazide (HYDRODIURIL) 25 MG tablet Take 0.5 tablets (12.5 mg) by mouth daily 45 tablet 1     levothyroxine (SYNTHROID/LEVOTHROID) 75 MCG tablet Take 1 tablet (75 mcg) by mouth every morning 90 tablet 1     Multiple Vitamins-Minerals (PRESERVISION AREDS 2 PO) Take 1 tablet by mouth daily       omega-3 fatty acids (FISH OIL) 1200 MG capsule Take 1 capsule by mouth daily.       potassium chloride ER (KLOR-CON) 20 MEQ CR tablet TAKE 1 TABLET (20 MEQ) BY MOUTH 2 TIMES DAILY 180 tablet 2     pravastatin (PRAVACHOL) 80 MG tablet TAKE 1 TABLET BY MOUTH EVERY DAY 90 tablet 1     psyllium (METAMUCIL) 58.6 % POWD Take by mouth daily       raloxifene (EVISTA) 60 MG tablet TAKE 1 TABLET BY MOUTH EVERY DAY 90 tablet 2     sertraline (ZOLOFT) 100 MG tablet Take 1 tablet (100 mg) by mouth daily 90 tablet 1     traZODone (DESYREL) 150 MG tablet TAKE 1 TABLET BY MOUTH AT BEDTIME 90 tablet 1     TURMERIC PO Take 2 tablets by mouth daily        valACYclovir (VALTREX) 500 MG tablet TAKE 2 TABLET TWICE A DAY BY MOUTH X 2 DAYS FOR FLARES.  0     vitamin B complex with vitamin C (VITAMIN  B COMPLEX) TABS tablet Take 1 tablet by mouth daily         Social History     Tobacco Use     Smoking status: Former Smoker     Packs/day: 0.00     Last attempt to quit: 3/27/1975     Years since quittin.3     Smokeless tobacco: Never Used   Substance Use Topics     Alcohol use: Yes     Comment: occasionally     Drug use: No        ROS:  General: no fever, chills  Weight: stable  Vision:stable. Last eye exam .  ENT: negative  Respiratory as above.  Cardiac: no chest pain or  "pressure  Abdominal: no nausea, vomiting, abdominal pain, bowel changes  Vascular no complaints of claudication  Neurologic:no complaints of neuropathy  Feet no lesions, in grown nails, edema   : no polyuria, hematuria, dysuria    Objective:  Patient alert in NAD  BP (!) 140/72   Pulse 99   Temp 98.3  F (36.8  C) (Oral)   Resp 12   Ht 1.664 m (5' 5.5\")   Wt 80.7 kg (178 lb)   SpO2 95%   Breastfeeding No   BMI 29.17 kg/m         Wt Readings from Last 4 Encounters:   07/10/20 80.7 kg (178 lb)   01/09/20 79.8 kg (176 lb)   12/02/19 83.2 kg (183 lb 8 oz)   12/01/19 80.7 kg (178 lb)       CV: CV: normal S1, S2 without murmur, S3 or S4.  Carotid pulses: full  LUNGS: clear though there is slight decreased expiratory phase, with forced expiration very soft wheeze.    Spirometry: Normal, 1.66/2.26, ratio 73%    Lab Results   Component Value Date    A1C 6.7 07/07/2020    A1C 6.4 09/20/2019    A1C 6.5 03/04/2019    A1C 6.4 10/09/2018    A1C 6.6 05/01/2018       Lab Results   Component Value Date    CHOL 213 09/20/2019    HDL 47 09/20/2019     09/20/2019    TRIG 274 09/20/2019    CHOLHDLRATIO 2.6 01/14/2015       JENNY-7 SCORE 11/15/2018 12/12/2019 5/29/2020   Total Score 7 8 8       PHQ 3/4/2019 9/30/2019 5/29/2020   PHQ-9 Total Score 10 8 6   Q9: Thoughts of better off dead/self-harm past 2 weeks Not at all Not at all Not at all        Assessment & Plan     1. Type 2 diabetes mellitus without complication, without long-term current use of insulin (H)  Well-controlled, continue medication, she requests a new meter so given a generic refill prescription  - blood glucose monitoring (NO BRAND SPECIFIED) meter device kit; Use to test blood sugar 1 times daily or as directed. Preferred blood glucose meter supplies to accompany: Blood Glucose Monitor Brands: per insurance. And give supplies  Dispense: 1 kit; Refill: 0    2. Exudative age-related macular degeneration of right eye with inactive scar (H)  Overall stable, " "follow-up with eye clinic    3. Major depressive disorder with single episode, in partial remission (H)  Controlled, continue medication    4. Dyspnea on exertion  This possible her symptoms could be some bronchospasm/asthmatic-like reaction, her x-ray was good and her stress test was good so we will try albuterol inhaler.  Suggest she use it 4 times daily regularly to see if that improves her exercise capacity and then may consider doing it as needed.  If she is not improving at all consider echocardiogram and cardiology follow-up.  She will follow-up by phone in a few weeks.  - albuterol (PROAIR HFA/PROVENTIL HFA/VENTOLIN HFA) 108 (90 Base) MCG/ACT inhaler; Inhale 2 puffs into the lungs every 6 hours  Dispense: 1 Inhaler; Refill: 11  - Spirometry, Breathing Capacity: Normal Order, Clinic Performed    5. Benign essential hypertension  Well-controlled, at her goal, continue medication    6. Hyperlipidemia LDL goal <100  Acceptable on the current tolerated dose of statin    7. Acquired hypothyroidism  Stable       BMI:   Estimated body mass index is 29.17 kg/m  as calculated from the following:    Height as of this encounter: 1.664 m (5' 5.5\").    Weight as of this encounter: 80.7 kg (178 lb).               Return in about 6 months (around 1/10/2021).    Adelita Kennedy MD  [facility]    "

## 2020-07-10 NOTE — NURSING NOTE
"BP (!) 140/72   Pulse 99   Temp 98.3  F (36.8  C) (Oral)   Resp 12   Ht 1.664 m (5' 5.5\")   Wt 80.7 kg (178 lb)   SpO2 95%   Breastfeeding No   BMI 29.17 kg/m      "

## 2020-07-10 NOTE — LETTER
July 14, 2020      Lata F Alpers  [address]   [place] [address]        Dear Ms.Yaoharry,    We are writing to inform you of your test results.    Your breathing test do show normal lung function but you might still have some asthmatic-like reaction so hopefully the inhaler will help.  Please let me know if not improving in a week or 2.     Resulted Orders   Spirometry, Breathing Capacity: Normal Order, Clinic Performed   Result Value Ref Range    FEV-1      FVC      FEV1/FVC      FEF 25/75         If you have any questions or concerns, please call the clinic at the number listed above.       Sincerely,        Adelita Kennedy MD

## 2020-07-11 RX ORDER — TRAZODONE HYDROCHLORIDE 150 MG/1
150 TABLET ORAL AT BEDTIME
Qty: 90 TABLET | Refills: 3 | Status: SHIPPED | OUTPATIENT
Start: 2020-07-11 | End: 2021-07-08

## 2020-07-11 RX ORDER — PRAVASTATIN SODIUM 80 MG/1
TABLET ORAL
Qty: 90 TABLET | Refills: 3 | Status: SHIPPED | OUTPATIENT
Start: 2020-07-11 | End: 2021-07-23

## 2020-07-11 RX ORDER — SERTRALINE HYDROCHLORIDE 100 MG/1
100 TABLET, FILM COATED ORAL DAILY
Qty: 90 TABLET | Refills: 3 | Status: SHIPPED | OUTPATIENT
Start: 2020-07-11 | End: 2021-07-02

## 2020-07-11 RX ORDER — GLIMEPIRIDE 1 MG/1
TABLET ORAL
Qty: 90 TABLET | Refills: 3 | Status: SHIPPED | OUTPATIENT
Start: 2020-07-11 | End: 2021-07-05

## 2020-07-11 RX ORDER — HYDROCHLOROTHIAZIDE 25 MG/1
12.5 TABLET ORAL DAILY
Qty: 45 TABLET | Refills: 3 | Status: SHIPPED | OUTPATIENT
Start: 2020-07-11 | End: 2021-08-30

## 2020-07-11 RX ORDER — LEVOTHYROXINE SODIUM 75 UG/1
75 TABLET ORAL EVERY MORNING
Qty: 90 TABLET | Refills: 3 | Status: SHIPPED | OUTPATIENT
Start: 2020-07-11 | End: 2021-07-26

## 2020-07-11 RX ORDER — AMLODIPINE BESYLATE 5 MG/1
5 TABLET ORAL DAILY
Qty: 90 TABLET | Refills: 3 | Status: SHIPPED | OUTPATIENT
Start: 2020-07-11 | End: 2021-07-05

## 2020-07-31 ENCOUNTER — TELEPHONE (OUTPATIENT)
Dept: INTERNAL MEDICINE | Facility: CLINIC | Age: 85
End: 2020-07-31

## 2020-07-31 NOTE — TELEPHONE ENCOUNTER
Fax received from [facility] - Prescriber Response Form for review and signature.  Put in Dr. Kennedy's in basket.

## 2020-08-24 ENCOUNTER — APPOINTMENT (OUTPATIENT)
Dept: GENERAL RADIOLOGY | Facility: CLINIC | Age: 85
End: 2020-08-24
Attending: EMERGENCY MEDICINE
Payer: COMMERCIAL

## 2020-08-24 ENCOUNTER — NURSE TRIAGE (OUTPATIENT)
Dept: INTERNAL MEDICINE | Facility: CLINIC | Age: 85
End: 2020-08-24

## 2020-08-24 ENCOUNTER — HOSPITAL ENCOUNTER (EMERGENCY)
Facility: CLINIC | Age: 85
Discharge: HOME | End: 2020-08-24
Attending: INTERNAL MEDICINE | Admitting: INTERNAL MEDICINE
Payer: COMMERCIAL

## 2020-08-24 VITALS
RESPIRATION RATE: 16 BRPM | DIASTOLIC BLOOD PRESSURE: 91 MMHG | WEIGHT: 178.57 LBS | BODY MASS INDEX: 29.26 KG/M2 | HEART RATE: 83 BPM | TEMPERATURE: 98 F | OXYGEN SATURATION: 94 % | SYSTOLIC BLOOD PRESSURE: 161 MMHG
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
## 2020-08-24 DIAGNOSIS — M79.622 PAIN OF LEFT UPPER ARM: ICD-10-CM

## 2020-08-24 PROCEDURE — 99285 EMERGENCY DEPT VISIT HI MDM: CPT

## 2020-08-24 PROCEDURE — 73060 X-RAY EXAM OF HUMERUS: CPT | Mod: LT

## 2020-08-24 PROCEDURE — 73502 X-RAY EXAM HIP UNI 2-3 VIEWS: CPT

## 2020-08-24 PROCEDURE — 73030 X-RAY EXAM OF SHOULDER: CPT | Mod: LT

## 2020-08-24 ASSESSMENT — ENCOUNTER SYMPTOMS
CONSTITUTIONAL NEGATIVE: 1
NUMBNESS: 1
RESPIRATORY NEGATIVE: 1
CARDIOVASCULAR NEGATIVE: 1
GASTROINTESTINAL NEGATIVE: 1
LIGHT-HEADEDNESS: 0
COLOR CHANGE: 1
ARTHRALGIAS: 1
DIZZINESS: 0
HEADACHES: 0
MYALGIAS: 1

## 2020-08-24 NOTE — ED AVS SNAPSHOT
[facility] Emergency Department  [address]  [facility] [address]  Phone:  [phone]  Fax:  [phone]                                    Norma F Alpers   MRN: [id number]    Department:  [facility] Emergency Department   Date of Visit:  8/24/2020           After Visit Summary Signature Page    I have received my discharge instructions, and my questions have been answered. I have discussed any challenges I see with this plan with the nurse or doctor.    ..........................................................................................................................................  Patient/Patient Representative Signature      ..........................................................................................................................................  Patient Representative Print Name and Relationship to Patient    ..................................................               ................................................  Date                                   Time    ..........................................................................................................................................  Reviewed by Signature/Title    ...................................................              ..............................................  Date                                               Time          22EPIC Rev 08/18

## 2020-08-24 NOTE — DISCHARGE INSTRUCTIONS
Please follow-up with orthopedics.  I want him to reviewed your x-rays to see if they detect any signs of subtle fracture and/or is it possible you could have a rotator cuff injury.  Recommend Tylenol and ibuprofen for pain.  If having worsening pain and cannot tolerate can always come back to the ER.  Would recommend using the sling until you see orthopedics.

## 2020-08-24 NOTE — ED PROVIDER NOTES
History   Chief Complaint  Fall    HPI   Norma F Alpers is a 85 year old female with a history of CVA, diabetes mellitus, GERD, hyperlipidemia, and hypertension who presents for evaluation of a fall sustained after going to the bathroom last night and tripping. She notes that she injured her left shoulder and right hip. Patient is unable to lift her left arm. She notes numbness in her left fingers that onset while she was in x-ray today. She denies LOC. Patient states she lives in a small apartment. She states she fell onto the ground and denies hitting her head. She denies any dizziness or chest pain before the fall. She notes she was able to stand up after the fall on her own despite some difficulty due to pain. She denies use of blood thinners besides daily aspirin. She states she normally can walk without assistance or a walker, however presents today walking in with a walker. She notes her arm pain extends from just distal to her elbow to her shoulder. Lata states she took 2 Tylenols this morning before coming in.    Allergies  Bee Venom  No Clinical Screening - See Comments  Hydralazine  Lisinopril  Penicillin G  Seasonal Allergies    Medications  Albuterol  Norvasc  Aspirin 325 mg  Zyrtec  Epipen  Amaryl  hydrochlorothiazide  levothyroxine  Zoloft  Evista  Valtrex    Past Medical History  CVA  Diabetes mellitus  GERD  Hyperlipidemia  Hypertension  Thyroid nodule    Past Surgical History  Appendectomy  Left foot surgery  Coccygectomy  Hernia repair  Hysterectomy  Release trigger finger    Family History  Alcohol/drug abuse  CHF  Diabetes mellitus  Stroke    Social History  Tobacco use: Former smoker  Alcohol use: yes  Drug use: no  Marital Status:     Review of Systems   Constitutional: Negative.    HENT: Negative.    Respiratory: Negative.    Cardiovascular: Negative.    Gastrointestinal: Negative.    Genitourinary: Negative.    Musculoskeletal: Positive for arthralgias (right hip) and myalgias  (left arm).   Skin: Positive for color change (contusion to righ hip).   Neurological: Positive for numbness (left fingers). Negative for dizziness, syncope, light-headedness and headaches.   All other systems reviewed and are negative.    Physical Exam     Patient Vitals for the past 24 hrs:   BP Temp Temp src Pulse Resp SpO2 Weight   08/24/20 1715 (!) 177/107 -- -- -- -- 94 % --   08/24/20 1430 -- -- -- -- -- -- 81 kg (178 lb 9.2 oz)   08/24/20 1428 (!) 168/91 98  F (36.7  C) Temporal 97 16 96 % 77.1 kg (170 lb)       Physical Exam    General: Patient is alert and interactive when I enter the room  Head:  The scalp, face, and head appear normal  Eyes:  Conjunctivae are normal  ENT:    The nose is normal    Pinnae are normal    External acoustic canals are normal  Neck:  Trachea midline, non-tender cervical spine   CV:  Pulses are normal  Resp:  No respiratory distress   Abdomen:      Soft, non-tender, non-distended  Musc:  Normal muscular tone    No major joint effusions    No asymmetric leg swelling    Tenderness to left shoulder and left upper arm, difficult with lifting arm up secondary to pain, flexion and extension intact at elbow, extremity is warm and well perfused, no overlying ecchymosis    Moderate sized hematoma to right lateral hip, mild tenderness to lateral hip    No pain with internal or external rotation of RLE    Skin:  No rash or lesions noted  Neuro:  Speech is normal and fluent. Face is symmetric.     Moving all extremities well.   Psych: Awake. Alert.  Normal affect.  Appropriate interactions.      Emergency Department Course     Imaging:  Radiology findings were communicated with the patient who voiced understanding of the findings.    XR Pelvis w Hip Right 1 View  IMPRESSION:  1.  Right hip and pelvis negative for fracture.  2.  Normal joint alignment and spacing in both hips. Minimal hypertrophic bone spurring.  3.  Rotatory lumbar scoliosis with degenerative disc and facet changes.    XR  Shoulder Left G/E 3 Views  IMPRESSION: Osteopenia. No acute fractures are evident. Mild  hypertrophic changes in the glenohumeral joint. Mild degenerative  changes in the acromioclavicular joint. Subacromial enthesophyte. Old  left anterior rib fractures.     Humerus XR,  G/E 2 views, left  IMPRESSION: Osteopenia. No acute fractures are evident. Degenerative  changes in the glenohumeral joint. No elbow joint effusion.    Readings per Radiology    Emergency Department Course:  Past medical records, nursing notes, and vitals reviewed.    1616 I physically examined the patient as documented above. She states she does not want any pain medications.    1754 I rechecked the patient and discussed the findings of their workup thus far including plans for discharge.     Findings and plan explained to the Patient. Patient discharged home with instructions regarding supportive care, medications, and reasons to return. The importance of close follow-up was reviewed.    I personally reviewed the imaging results with the Patient and answered all related questions prior to discharge.     Impression & Plan   Medical Decision Making:  Norma F Alpers is a 85 year old female who presents for evaluation of a fall. Lata notes pain in her left shoulder, left arm, and right hip. Lata denied cranial impact or LOC. She denies any chest pain or lightheadedness prior to the fall and based on her presentation and physical exam here, I believe this was clearly a mechanical fall, not syncope.  Based on this I did not do basic blood work and urinalysis.  Pelvis, shoulder and humeral x-rays, with results as above, returned negative for fracture and were largely reassuring aside from appreciable osteopenia.  Patient does have difficulty with moving her left upper arm secondary to pain in her shoulder.  Her x-ray does not show a fracture however I wonder if she has a mild impaction at the humeral neck.  However she could also have a rotator cuff  injury.  Regardless I think she needs to follow-up with orthopedics.  With her difficulty with movement of her arm I did give her a sling.  However I want her to follow-up with orthopedics in the next few days to have further recommendations.  Patient felt comfortable this plan patient discharged.  Diagnosis:    ICD-10-CM    1. Fall, initial encounter  W19.XXXA    2. Pain of left upper arm  M79.622        Disposition:  Discharged to home.    Scribe Disclosure:  I, Ashwin Nixon, am serving as a scribe at 4:16 PM on 8/24/2020 to document services personally performed by Prisca Richards MD based on my observations and the provider's statements to me.      Prisca Richards MD  08/25/20 0988

## 2020-08-24 NOTE — TELEPHONE ENCOUNTER
Patient calls to report she took a fall about 3:00 a.m. today when she got up to use the bathroom.  The door was closed but not latched and she fell into it landing on the floor.  She denies loss of consciousness or hitting her head.  She landed on her left shoulder and also hit her right arm on door or door frame sustaining an abrasion and bruising  She is now having great difficulty getting dressed as she cannot pull up her pants and she is unable to lift her left arm.  Rates pain 8/10.  Advised patient she should be seen in ER or ortho urgent care for evaluation.  Patient agrees, is concerned she may have broken her arm.  TENZIN Pinzon R.N.

## 2020-08-24 NOTE — ED TRIAGE NOTES
Pt was going to the bathroom last night and she tripped and fell and injured the left shoulder and right hip. Unable to lift her right arm.  Denies LOC

## 2020-09-08 ENCOUNTER — VIRTUAL VISIT (OUTPATIENT)
Dept: INTERNAL MEDICINE | Facility: CLINIC | Age: 85
End: 2020-09-08
Payer: COMMERCIAL

## 2020-09-08 DIAGNOSIS — M25.512 ACUTE PAIN OF LEFT SHOULDER: Primary | ICD-10-CM

## 2020-09-08 PROCEDURE — 99442 ZZC PHYSICIAN TELEPHONE EVALUATION 11-20 MIN: CPT | Performed by: INTERNAL MEDICINE

## 2020-09-08 NOTE — PROGRESS NOTES
"Norma F Alpers is a 85 year old female who is being evaluated via a billable telephone visit.      The patient has been notified of following:     \"This telephone visit will be conducted via a call between you and your physician/provider. We have found that certain health care needs can be provided without the need for a physical exam.  This service lets us provide the care you need with a short phone conversation.  If a prescription is necessary we can send it directly to your pharmacy.  If lab work is needed we can place an order for that and you can then stop by our lab to have the test done at a later time.    Telephone visits are billed at different rates depending on your insurance coverage. During this emergency period, for some insurers they may be billed the same as an in-person visit.  Please reach out to your insurance provider with any questions.    If during the course of the call the physician/provider feels a telephone visit is not appropriate, you will not be charged for this service.\"    Patient has given verbal consent for Telephone visit?  Yes    What phone number would you like to be contacted at? [phone]    How would you like to obtain your AVS? Mail a copy    Subjective     Norma F Alpers is a 85 year old female who presents via phone visit today for the following health issues:    HPI    ED/UC Followup:    Facility:  [facility] ED  Date of visit: 08/24/2020  Reason for visit: Fall; pain of left shoulder  Current Status: left shoulder still painful, not able to move much     She had tripped and fell onto left side. She had negative xrays but still not able to left arm. The ED recommended ortho follow up but she has not scheduled yet.   She has some soreness of left lateral ribs. She has large amount of bruising at left hip but pain is better.     Patient Active Problem List   Diagnosis     Advanced directives, counseling/discussion     Benign essential hypertension     " Hyperlipidemia LDL goal <100     GERD (gastroesophageal reflux disease)     Aphasic stroke     Osteopenia     Acquired hypothyroidism     Anxiety     Thyroid nodule     Type 2 diabetes mellitus without complication, without long-term current use of insulin (H)     Irritable bowel syndrome with both constipation and diarrhea     Major depressive disorder with single episode, in partial remission (H)     Exudative age-related macular degeneration of right eye with inactive scar (H)     Current Outpatient Medications   Medication Sig Dispense Refill     albuterol (PROAIR HFA/PROVENTIL HFA/VENTOLIN HFA) 108 (90 Base) MCG/ACT inhaler Inhale 2 puffs into the lungs every 6 hours 1 Inhaler 11     amLODIPine (NORVASC) 5 MG tablet Take 1 tablet (5 mg) by mouth daily 90 tablet 3     aspirin 325 MG tablet Take  by mouth daily.       blood glucose monitoring (ACCU-CHEK ANTONY SMARTVIEW) meter device kit Use to test blood sugar 1-2 times daily or as directed.  Ok to substitute alternative if insurance prefers. 1 kit 0     blood glucose monitoring (ACCU-CHEK SMARTVIEW) test strip Use to test blood sugar 2-3 times daily or as directed. 100 strip prn     blood glucose monitoring (NO BRAND SPECIFIED) meter device kit Use to test blood sugar 1 times daily or as directed. Preferred blood glucose meter supplies to accompany: Blood Glucose Monitor Brands: per insurance. And give supplies 1 kit 0     Calcium Carbonate (CALCIUM 600 PO) Take 1 tablet by mouth daily 2 times daily       cetirizine (ZYRTEC) 10 MG tablet Take 10 mg by mouth daily as needed for allergies       EPINEPHrine (EPIPEN) 0.3 MG/0.3ML injection Inject 0.3 mLs (0.3 mg) into the muscle once as needed for anaphylaxis 2 each 5     glimepiride (AMARYL) 1 MG tablet TAKE 1 TABLET (1 MG) BY MOUTH EVERY MORNING (BEFORE BREAKFAST) 90 tablet 3     glucosamine-chondroitinoitin 750-600 MG TABS Take 1 tablet by mouth 2 times daily.         hydrochlorothiazide (HYDRODIURIL) 25 MG tablet  Take 0.5 tablets (12.5 mg) by mouth daily 45 tablet 3     levothyroxine (SYNTHROID/LEVOTHROID) 75 MCG tablet Take 1 tablet (75 mcg) by mouth every morning 90 tablet 3     Multiple Vitamins-Minerals (PRESERVISION AREDS 2 PO) Take 1 tablet by mouth daily       omega-3 fatty acids (FISH OIL) 1200 MG capsule Take 1 capsule by mouth daily.       potassium chloride ER (KLOR-CON) 20 MEQ CR tablet TAKE 1 TABLET (20 MEQ) BY MOUTH 2 TIMES DAILY 180 tablet 2     pravastatin (PRAVACHOL) 80 MG tablet TAKE 1 TABLET BY MOUTH EVERY DAY 90 tablet 3     psyllium (METAMUCIL) 58.6 % POWD Take by mouth daily       raloxifene (EVISTA) 60 MG tablet TAKE 1 TABLET BY MOUTH EVERY DAY 90 tablet 2     sertraline (ZOLOFT) 100 MG tablet Take 1 tablet (100 mg) by mouth daily 90 tablet 3     thin (NO BRAND SPECIFIED) lancets 1 each daily 100 each 4     traZODone (DESYREL) 150 MG tablet Take 1 tablet (150 mg) by mouth At Bedtime 90 tablet 3     TURMERIC PO Take 2 tablets by mouth daily        valACYclovir (VALTREX) 500 MG tablet TAKE 2 TABLET TWICE A DAY BY MOUTH X 2 DAYS FOR FLARES.  0     vitamin B complex with vitamin C (VITAMIN  B COMPLEX) TABS tablet Take 1 tablet by mouth daily        Social History     Tobacco Use     Smoking status: Former Smoker     Packs/day: 0.00     Last attempt to quit: 3/27/1975     Years since quittin.4     Smokeless tobacco: Never Used   Substance Use Topics     Alcohol use: Yes     Comment: occasionally     Drug use: No               Review of Systems   No fever, chills, neck pain, numbness, tingling, stable dyspnea on exertion        Objective          Vitals:  No vitals were obtained today due to virtual visit.      PSYCH: Alert and oriented times 3; coherent speech, normal   rate and volume, able to articulate logical thoughts, able   to abstract reason, no tangential thoughts, no hallucinations   or delusions  Her affect is normal  RESP: No cough, no audible wheezing, able to talk in full  sentences  Remainder of exam unable to be completed due to telephone visits            Assessment/Plan:    Assessment & Plan     Acute pain of left shoulder  May have rotator cuff tear, refer ortho.   - Orthopedic & Spine  Referral; Future       Return in about 4 months (around 1/10/2021) for Lab with blood and urine, Diabetes, see me a week after lab.    Adelita Kennedy MD  [facility]    Phone call duration:  12 minutes

## 2020-09-15 ENCOUNTER — OFFICE VISIT (OUTPATIENT)
Dept: ORTHOPEDICS | Facility: CLINIC | Age: 85
End: 2020-09-15
Attending: INTERNAL MEDICINE
Payer: COMMERCIAL

## 2020-09-15 VITALS
HEIGHT: 66 IN | WEIGHT: 178 LBS | SYSTOLIC BLOOD PRESSURE: 140 MMHG | DIASTOLIC BLOOD PRESSURE: 86 MMHG | BODY MASS INDEX: 28.61 KG/M2
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
## 2020-09-15 DIAGNOSIS — M67.912 DYSFUNCTION OF LEFT ROTATOR CUFF: ICD-10-CM

## 2020-09-15 DIAGNOSIS — M25.512 ACUTE PAIN OF LEFT SHOULDER: ICD-10-CM

## 2020-09-15 DIAGNOSIS — M19.012 PRIMARY OSTEOARTHRITIS OF LEFT SHOULDER: ICD-10-CM

## 2020-09-15 PROCEDURE — 20611 DRAIN/INJ JOINT/BURSA W/US: CPT | Mod: LT | Performed by: FAMILY MEDICINE

## 2020-09-15 PROCEDURE — 99203 OFFICE O/P NEW LOW 30 MIN: CPT | Mod: 25 | Performed by: FAMILY MEDICINE

## 2020-09-15 RX ORDER — METHYLPREDNISOLONE ACETATE 40 MG/ML
40 INJECTION, SUSPENSION INTRA-ARTICULAR; INTRALESIONAL; INTRAMUSCULAR; SOFT TISSUE
Status: DISCONTINUED | OUTPATIENT
Start: 2020-09-15 | End: 2021-08-02

## 2020-09-15 RX ADMIN — METHYLPREDNISOLONE ACETATE 40 MG: 40 INJECTION, SUSPENSION INTRA-ARTICULAR; INTRALESIONAL; INTRAMUSCULAR; SOFT TISSUE at 15:38

## 2020-09-15 ASSESSMENT — MIFFLIN-ST. JEOR: SCORE: 1261.21

## 2020-09-15 NOTE — PROGRESS NOTES
"ASSESSMENT & PLAN    1. Fall, initial encounter    2. Acute pain of left shoulder    3. Dysfunction of left rotator cuff    4. Primary osteoarthritis of left shoulder      Seen in consultation for acute left shoulder pain after a fall  Suspect large rotator cuff tear  Reviewed xrays - no fracture, arthritis  Physical therapy: [place] [facility] - [phone]  Steroid injection of the left shoulder: intra-articular was completed today  -----    SUBJECTIVE  Norma F Alpers is a/an 85 year old Right handed female who is seen in consultation at the request of  Adelita Kennedy M.D. for evaluation of left shoulder pain. The patient is seen by themselves.    Onset: 8/24/20 ~ 3.1 weeks ago. Patient describes injury as she tripped and fell at her home one night and hit the door and floor.  Location of Pain: left lateral shoulder / upper arm  Rating of Pain at worst: 10/10  Rating of Pain Currently: 2/10  Worsened by: lift arm, reaching  Better with: rest / activity avoidance  Treatments tried: rest/activity avoidance, Tylenol, Aleve, previous imaging (xray 8/24/20) and sling  Associated symptoms: numbness and tingling radiating down left arm to hand (all fingers on hand)  Orthopedic history: NO  Relevant surgical history: NO  Patient Social History: retired    Patient's past medical, surgical, social, and family histories were reviewed today and no pertinent history related to patient's presenting problem.    REVIEW OF SYSTEMS:  10 point ROS is negative other than symptoms noted above in HPI, Past Medical History or as stated below  Constitutional: NEGATIVE for fever, chills, change in weight  Skin: NEGATIVE for worrisome rashes, moles or lesions  GI/: NEGATIVE for bowel or bladder changes  Neuro: NEGATIVE for weakness, dizziness or paresthesias    OBJECTIVE:  BP (!) 140/86   Ht 1.664 m (5' 5.5\")   Wt 80.7 kg (178 lb)   BMI 29.17 kg/m     General: healthy, alert and in no distress  HEENT: no scleral icterus or " conjunctival erythema  Skin: no suspicious lesions or rash. No jaundice.  CV: regular rhythm by palpation  Resp: normal respiratory effort without conversational dyspnea   Psych: normal mood and affect  Gait: using a wheeled walker, fair  coordination and balance  Neuro: normal light touch sensory exam of the bilateral upper extremities.    MSK:  LEFT SHOULDER  Inspection:    moderate muscle atrophy     Palpation:    Tender about the anterior capsule and supraspinatus insertion. Remainder of bony and tendinous landmarks are nontender.  Active Range of Motion:     Abduction 300, ,   Strength:    Scapular plane abduction 0/5,  ER 4/5, IR 5-/5    Independent visualization of the below image:  XR SHOULDER LT G/E 3 VW 8/24/2020 4:00 PM      HISTORY: fall, unable to lift arm     COMPARISON: Left humerus from today.                                                                      IMPRESSION: Osteopenia. No acute fractures are evident. Mild  hypertrophic changes in the glenohumeral joint. Mild degenerative  changes in the acromioclavicular joint. Subacromial enthesophyte. Old  left anterior rib fractures.      MARCELINO NAJERA MD    XR HUMERUS LT G/E 2 VW 8/24/2020 3:59 PM      HISTORY: fall, unable to lift L arm     COMPARISON: Left shoulder from today                                                                      IMPRESSION: Osteopenia. No acute fractures are evident. Degenerative  changes in the glenohumeral joint. No elbow joint effusion.     MARCELINO NAJERA MD    Large Joint Injection/Arthocentesis: L glenohumeral joint    Date/Time: 9/15/2020 3:38 PM  Performed by: Myke Shiekh DO  Authorized by: Myke Sheikh DO     Indications:  Pain and osteoarthritis  Needle Size:  22 G  Guidance: ultrasound    Approach:  Posterior  Location:  Shoulder      Site:  L glenohumeral joint  Medications:  40 mg methylPREDNISolone 40 MG/ML  Outcome:  Tolerated well, no immediate  complications  Procedure discussed: discussed risks, benefits, and alternatives    Consent Given by:  Patient  Timeout: timeout called immediately prior to procedure    Prep: patient was prepped and draped in usual sterile fashion               Myke Sheikh DO [facility] [place]

## 2020-09-15 NOTE — PATIENT INSTRUCTIONS
1. Fall, initial encounter    2. Acute pain of left shoulder    3. Dysfunction of left rotator cuff    4. Primary osteoarthritis of left shoulder      Physical therapy: [place] [facility] - [phone]  Steroid injection of the left shoulder: intra-articular was completed today  - Ok to shower  - No bathtub, hot tub or swimming for 2 days  - The lidocaine (what is giving you pain relief right now) will likely stop working in 1-2 hours.  You will then have pain again, similar to before you received the injection. The corticosteroid will not start working until approximately 1-2 weeks from now.  In a small percentage of people, cortisone can cause flushing/redness in the face. This usually lasts for 1-3 days and resolves. Cool compress and Ibuprofen/Tylenol can help if this happens.

## 2020-09-15 NOTE — LETTER
9/15/2020         RE: Norma F Alpers  [address]  [place] [address]        Dear Colleague,    Thank you for referring your patient, Norma F Alpers, to the [facility]. Please see a copy of my visit note below.    ASSESSMENT & PLAN    1. Fall, initial encounter    2. Acute pain of left shoulder    3. Dysfunction of left rotator cuff    4. Primary osteoarthritis of left shoulder      Seen in consultation for acute left shoulder pain after a fall  Suspect large rotator cuff tear  Reviewed xrays - no fracture, arthritis  Physical therapy: [place] [facility] - [phone]  Steroid injection of the left shoulder: intra-articular was completed today  -----    SUBJECTIVE  Norma F Alpers is a/an 85 year old Right handed female who is seen in consultation at the request of  Adelita Kennedy M.D. for evaluation of left shoulder pain. The patient is seen by themselves.    Onset: 8/24/20 ~ 3.1 weeks ago. Patient describes injury as she tripped and fell at her home one night and hit the door and floor.  Location of Pain: left lateral shoulder / upper arm  Rating of Pain at worst: 10/10  Rating of Pain Currently: 2/10  Worsened by: lift arm, reaching  Better with: rest / activity avoidance  Treatments tried: rest/activity avoidance, Tylenol, Aleve, previous imaging (xray 8/24/20) and sling  Associated symptoms: numbness and tingling radiating down left arm to hand (all fingers on hand)  Orthopedic history: NO  Relevant surgical history: NO  Patient Social History: retired    Patient's past medical, surgical, social, and family histories were reviewed today and no pertinent history related to patient's presenting problem.    REVIEW OF SYSTEMS:  10 point ROS is negative other than symptoms noted above in HPI, Past Medical History or as stated below  Constitutional: NEGATIVE for fever, chills, change in weight  Skin: NEGATIVE for worrisome rashes, moles or lesions  GI/:  "NEGATIVE for bowel or bladder changes  Neuro: NEGATIVE for weakness, dizziness or paresthesias    OBJECTIVE:  BP (!) 140/86   Ht 1.664 m (5' 5.5\")   Wt 80.7 kg (178 lb)   BMI 29.17 kg/m     General: healthy, alert and in no distress  HEENT: no scleral icterus or conjunctival erythema  Skin: no suspicious lesions or rash. No jaundice.  CV: regular rhythm by palpation  Resp: normal respiratory effort without conversational dyspnea   Psych: normal mood and affect  Gait: using a wheeled walker, fair  coordination and balance  Neuro: normal light touch sensory exam of the bilateral upper extremities.    MSK:  LEFT SHOULDER  Inspection:    moderate muscle atrophy     Palpation:    Tender about the anterior capsule and supraspinatus insertion. Remainder of bony and tendinous landmarks are nontender.  Active Range of Motion:     Abduction 300, ,   Strength:    Scapular plane abduction 0/5,  ER 4/5, IR 5-/5    Independent visualization of the below image:  XR SHOULDER LT G/E 3 VW 8/24/2020 4:00 PM      HISTORY: fall, unable to lift arm     COMPARISON: Left humerus from today.                                                                      IMPRESSION: Osteopenia. No acute fractures are evident. Mild  hypertrophic changes in the glenohumeral joint. Mild degenerative  changes in the acromioclavicular joint. Subacromial enthesophyte. Old  left anterior rib fractures.      MARCELINO NAJERA MD    XR HUMERUS LT G/E 2 VW 8/24/2020 3:59 PM      HISTORY: fall, unable to lift L arm     COMPARISON: Left shoulder from today                                                                      IMPRESSION: Osteopenia. No acute fractures are evident. Degenerative  changes in the glenohumeral joint. No elbow joint effusion.     MARCELINO NAJERA MD    Large Joint Injection/Arthocentesis: L glenohumeral joint    Date/Time: 9/15/2020 3:38 PM  Performed by: Myke Sheikh DO  Authorized by: Myke Sheikh DO "     Indications:  Pain and osteoarthritis  Needle Size:  22 G  Guidance: ultrasound    Approach:  Posterior  Location:  Shoulder      Site:  L glenohumeral joint  Medications:  40 mg methylPREDNISolone 40 MG/ML  Outcome:  Tolerated well, no immediate complications  Procedure discussed: discussed risks, benefits, and alternatives    Consent Given by:  Patient  Timeout: timeout called immediately prior to procedure    Prep: patient was prepped and draped in usual sterile fashion               Myke Sheikh DO [facility]      Again, thank you for allowing me to participate in the care of your patient.        Sincerely,        Myke Sheikh DO

## 2020-09-22 ENCOUNTER — THERAPY VISIT (OUTPATIENT)
Dept: PHYSICAL THERAPY | Facility: CLINIC | Age: 85
End: 2020-09-22
Attending: FAMILY MEDICINE
Payer: COMMERCIAL

## 2020-09-22 DIAGNOSIS — M67.912 DYSFUNCTION OF LEFT ROTATOR CUFF: ICD-10-CM

## 2020-09-22 DIAGNOSIS — M19.012 PRIMARY OSTEOARTHRITIS OF LEFT SHOULDER: ICD-10-CM

## 2020-09-22 DIAGNOSIS — M25.512 ACUTE PAIN OF LEFT SHOULDER: ICD-10-CM
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
## 2020-09-22 PROCEDURE — 97161 PT EVAL LOW COMPLEX 20 MIN: CPT | Mod: GP | Performed by: PHYSICAL THERAPIST

## 2020-09-22 PROCEDURE — 97110 THERAPEUTIC EXERCISES: CPT | Mod: GP | Performed by: PHYSICAL THERAPIST

## 2020-10-26 DIAGNOSIS — M85.80 OSTEOPENIA, UNSPECIFIED LOCATION: ICD-10-CM

## 2020-10-26 DIAGNOSIS — I10 ESSENTIAL HYPERTENSION: ICD-10-CM

## 2020-10-28 RX ORDER — POTASSIUM CHLORIDE 1500 MG/1
TABLET, EXTENDED RELEASE ORAL
Qty: 180 TABLET | Refills: 3 | Status: SHIPPED | OUTPATIENT
Start: 2020-10-28 | End: 2021-08-02

## 2020-10-28 RX ORDER — RALOXIFENE HYDROCHLORIDE 60 MG/1
TABLET, FILM COATED ORAL
Qty: 90 TABLET | Refills: 3 | Status: SHIPPED | OUTPATIENT
Start: 2020-10-28 | End: 2021-08-02

## 2020-10-28 NOTE — TELEPHONE ENCOUNTER
Pending Prescriptions:                       Disp   Refills    potassium chloride ER (KLOR-CON) 20 MEQ C*180 ta*2            Sig: TAKE 1 TABLET BY MOUTH 2 TIMES DAILY    raloxifene (EVISTA) 60 MG tablet [Pharmac*90 tab*2            Sig: TAKE 1 TABLET BY MOUTH EVERY DAY    Prescriptions approved per [facility] Refill Protocol.

## 2020-12-15 ENCOUNTER — TRANSFERRED RECORDS (OUTPATIENT)
Dept: HEALTH INFORMATION MANAGEMENT | Facility: CLINIC | Age: 85
End: 2020-12-15

## 2020-12-15 ENCOUNTER — TELEPHONE (OUTPATIENT)
Dept: INTERNAL MEDICINE | Facility: CLINIC | Age: 85
End: 2020-12-15

## 2020-12-15 NOTE — TELEPHONE ENCOUNTER
Visiting nurse at pts home today.     BP today. Took Amlodipine this AM.   150/96.     Pt Stopped hydrochlorothiazide on her own because was going to Bathroom so much. She may need alternative at her next OV in January. She is going to restart for now.     No other symptoms, no headaches. A little unsteady on her feet,  but nurse states this may not be new.     Advised Nurse to have pt call back sooner if BP remains elevated after restarting the hydrochlorothiazide. She agrees.     Scheduled lab and OV in early January.     KEITH for Dr Kennedy.     BP Readings from Last 3 Encounters:   09/15/20 (!) 140/86   08/24/20 (!) 161/91   07/10/20 (!) 140/72

## 2021-01-11 DIAGNOSIS — E78.5 HYPERLIPIDEMIA LDL GOAL <100: ICD-10-CM

## 2021-01-11 DIAGNOSIS — I10 BENIGN ESSENTIAL HYPERTENSION: ICD-10-CM

## 2021-01-11 DIAGNOSIS — E11.9 TYPE 2 DIABETES MELLITUS WITHOUT COMPLICATION, WITHOUT LONG-TERM CURRENT USE OF INSULIN (H): ICD-10-CM

## 2021-01-11 LAB — HBA1C MFR BLD: 6.1 % (ref 0–5.6)

## 2021-01-11 PROCEDURE — 82043 UR ALBUMIN QUANTITATIVE: CPT | Performed by: INTERNAL MEDICINE

## 2021-01-11 PROCEDURE — 83036 HEMOGLOBIN GLYCOSYLATED A1C: CPT | Performed by: INTERNAL MEDICINE

## 2021-01-11 PROCEDURE — 80048 BASIC METABOLIC PNL TOTAL CA: CPT | Performed by: INTERNAL MEDICINE

## 2021-01-11 PROCEDURE — 80061 LIPID PANEL: CPT | Performed by: INTERNAL MEDICINE

## 2021-01-11 PROCEDURE — 36415 COLL VENOUS BLD VENIPUNCTURE: CPT | Performed by: INTERNAL MEDICINE

## 2021-01-12 LAB
ANION GAP SERPL CALCULATED.3IONS-SCNC: 6 MMOL/L (ref 3–14)
BUN SERPL-MCNC: 20 MG/DL (ref 7–30)
CALCIUM SERPL-MCNC: 9.9 MG/DL (ref 8.5–10.1)
CHLORIDE SERPL-SCNC: 110 MMOL/L (ref 94–109)
CHOLEST SERPL-MCNC: 204 MG/DL
CO2 SERPL-SCNC: 25 MMOL/L (ref 20–32)
CREAT SERPL-MCNC: 0.67 MG/DL (ref 0.52–1.04)
CREAT UR-MCNC: 115 MG/DL
GFR SERPL CREATININE-BSD FRML MDRD: 80 ML/MIN/{1.73_M2}
GLUCOSE SERPL-MCNC: 121 MG/DL (ref 70–99)
HDLC SERPL-MCNC: 61 MG/DL
LDLC SERPL CALC-MCNC: 104 MG/DL
MICROALBUMIN UR-MCNC: 30 MG/L
MICROALBUMIN/CREAT UR: 25.65 MG/G CR (ref 0–25)
NONHDLC SERPL-MCNC: 143 MG/DL
POTASSIUM SERPL-SCNC: 4.1 MMOL/L (ref 3.4–5.3)
SODIUM SERPL-SCNC: 141 MMOL/L (ref 133–144)
TRIGL SERPL-MCNC: 197 MG/DL

## 2021-01-18 ENCOUNTER — OFFICE VISIT (OUTPATIENT)
Dept: INTERNAL MEDICINE | Facility: CLINIC | Age: 86
End: 2021-01-18
Payer: COMMERCIAL

## 2021-01-18 VITALS
WEIGHT: 175.7 LBS | HEIGHT: 66 IN | BODY MASS INDEX: 28.24 KG/M2 | RESPIRATION RATE: 16 BRPM | SYSTOLIC BLOOD PRESSURE: 126 MMHG | DIASTOLIC BLOOD PRESSURE: 82 MMHG | HEART RATE: 110 BPM | TEMPERATURE: 98.3 F | OXYGEN SATURATION: 96 %

## 2021-01-18 DIAGNOSIS — I10 BENIGN ESSENTIAL HYPERTENSION: ICD-10-CM

## 2021-01-18 DIAGNOSIS — F32.4 MAJOR DEPRESSIVE DISORDER WITH SINGLE EPISODE, IN PARTIAL REMISSION (H): ICD-10-CM

## 2021-01-18 DIAGNOSIS — E03.9 ACQUIRED HYPOTHYROIDISM: ICD-10-CM

## 2021-01-18 DIAGNOSIS — H35.3213 EXUDATIVE AGE-RELATED MACULAR DEGENERATION OF RIGHT EYE WITH INACTIVE SCAR (H): ICD-10-CM

## 2021-01-18 DIAGNOSIS — E78.5 HYPERLIPIDEMIA LDL GOAL <100: ICD-10-CM

## 2021-01-18 DIAGNOSIS — E11.9 TYPE 2 DIABETES MELLITUS WITHOUT COMPLICATION, WITHOUT LONG-TERM CURRENT USE OF INSULIN (H): Primary | ICD-10-CM

## 2021-01-18 PROCEDURE — 99214 OFFICE O/P EST MOD 30 MIN: CPT | Performed by: INTERNAL MEDICINE

## 2021-01-18 ASSESSMENT — MIFFLIN-ST. JEOR: SCORE: 1245.78

## 2021-01-18 NOTE — PROGRESS NOTES
"  Assessment & Plan     Type 2 diabetes mellitus without complication, without long-term current use of insulin (H)  Well controlled, recheck 6 months.     Major depressive disorder with single episode, in partial remission (H)  Stable, continue med    Exudative age-related macular degeneration of right eye with inactive scar (H)  Per eye    Benign essential hypertension  Well controlled, cotninue med    Acquired hypothyroidism  stable    Hyperlipidemia LDL goal <100  stable         BMI:   Estimated body mass index is 28.79 kg/m  as calculated from the following:    Height as of this encounter: 1.664 m (5' 5.5\").    Weight as of this encounter: 79.7 kg (175 lb 11.2 oz).             Return in about 6 months (around 7/18/2021) for Lab Work, Diabetes, see me a week after lab.    Adelita Kennedy MD  [facility]    Jarod Guido is a 86 year old who presents to clinic today for the following health issues     HPI       Diabetes Follow-up  Her sugars are stable  How often are you checking your blood sugar? A few times a week  What time of day are you checking your blood sugars (select all that apply)?  Before meals  Have you had any blood sugars above 200?  No  Have you had any blood sugars below 70?  No    What symptoms do you notice when your blood sugar is low?  None    What concerns do you have today about your diabetes? None     Do you have any of these symptoms? (Select all that apply)  No numbness or tingling in feet.  No redness, sores or blisters on feet.  No complaints of excessive thirst.  No reports of blurry vision.  No significant changes to weight.        Hyperlipidemia Follow-Up      Are you regularly taking any medication or supplement to lower your cholesterol?   Yes- pravastatin    Are you having muscle aches or other side effects that you think could be caused by your cholesterol lowering medication?  No    Hypertension Follow-up      Do you check your blood pressure " regularly outside of the clinic? Yes     Are you following a low salt diet? No    Are your blood pressures ever more than 140 on the top number (systolic) OR more   than 90 on the bottom number (diastolic), for example 140/90? Yes          Depression and Anxiety Follow-Up    How are you doing with your depression since your last visit? No change    How are you doing with your anxiety since your last visit?  No change    Are you having other symptoms that might be associated with depression or anxiety? No    Have you had a significant life event? No     Do you have any concerns with your use of alcohol or other drugs? No      PHQ 3/4/2019 9/30/2019 5/29/2020   PHQ-9 Total Score 10 8 6   Q9: Thoughts of better off dead/self-harm past 2 weeks Not at all Not at all Not at all     JENNY-7 SCORE 11/15/2018 12/12/2019 5/29/2020   Total Score 7 8 8         Suicide Assessment Five-step Evaluation and Treatment (SAFE-T)      How many servings of fruits and vegetables do you eat daily?  0-1    On average, how many sweetened beverages do you drink each day (Examples: soda, juice, sweet tea, etc.  Do NOT count diet or artificially sweetened beverages)?   0    How many days per week do you exercise enough to make your heart beat faster? 3 or less    How many minutes a day do you exercise enough to make your heart beat faster? 9 or less    How many days per week do you miss taking your medication? 0      Other problems:   Macular degeneration: stable        Current concerns: she has fallen with cane, off balance since her stroke. Using a walker now which has helped.     Patient Active Problem List   Diagnosis     Advanced directives, counseling/discussion     Benign essential hypertension     Hyperlipidemia LDL goal <100     GERD (gastroesophageal reflux disease)     Aphasic stroke     Osteopenia     Acquired hypothyroidism     Anxiety     Thyroid nodule     Type 2 diabetes mellitus without complication, without long-term current  use of insulin (H)     Irritable bowel syndrome with both constipation and diarrhea     Major depressive disorder with single episode, in partial remission (H)     Exudative age-related macular degeneration of right eye with inactive scar (H)     Acute pain of left shoulder     Fall, initial encounter     Dysfunction of left rotator cuff     Primary osteoarthritis of left shoulder       Current Outpatient Medications   Medication Sig Dispense Refill     albuterol (PROAIR HFA/PROVENTIL HFA/VENTOLIN HFA) 108 (90 Base) MCG/ACT inhaler Inhale 2 puffs into the lungs every 6 hours 1 Inhaler 11     amLODIPine (NORVASC) 5 MG tablet Take 1 tablet (5 mg) by mouth daily 90 tablet 3     aspirin 325 MG tablet Take  by mouth daily.       Calcium Carbonate (CALCIUM 600 PO) Take 1 tablet by mouth daily 2 times daily       cetirizine (ZYRTEC) 10 MG tablet Take 10 mg by mouth daily as needed for allergies       EPINEPHrine (EPIPEN) 0.3 MG/0.3ML injection Inject 0.3 mLs (0.3 mg) into the muscle once as needed for anaphylaxis 2 each 5     glimepiride (AMARYL) 1 MG tablet TAKE 1 TABLET (1 MG) BY MOUTH EVERY MORNING (BEFORE BREAKFAST) 90 tablet 3     glucosamine-chondroitinoitin 750-600 MG TABS Take 1 tablet by mouth 2 times daily.         hydrochlorothiazide (HYDRODIURIL) 25 MG tablet Take 0.5 tablets (12.5 mg) by mouth daily 45 tablet 3     levothyroxine (SYNTHROID/LEVOTHROID) 75 MCG tablet Take 1 tablet (75 mcg) by mouth every morning 90 tablet 3     Multiple Vitamins-Minerals (PRESERVISION AREDS 2 PO) Take 1 tablet by mouth daily       omega-3 fatty acids (FISH OIL) 1200 MG capsule Take 1 capsule by mouth daily.       potassium chloride ER (KLOR-CON) 20 MEQ CR tablet TAKE 1 TABLET BY MOUTH 2 TIMES DAILY 180 tablet 3     pravastatin (PRAVACHOL) 80 MG tablet TAKE 1 TABLET BY MOUTH EVERY DAY 90 tablet 3     psyllium (METAMUCIL) 58.6 % POWD Take by mouth daily       raloxifene (EVISTA) 60 MG tablet TAKE 1 TABLET BY MOUTH EVERY DAY 90  "tablet 3     sertraline (ZOLOFT) 100 MG tablet Take 1 tablet (100 mg) by mouth daily 90 tablet 3     thin (NO BRAND SPECIFIED) lancets 1 each daily 100 each 4     traZODone (DESYREL) 150 MG tablet Take 1 tablet (150 mg) by mouth At Bedtime 90 tablet 3     TURMERIC PO Take 2 tablets by mouth daily        valACYclovir (VALTREX) 500 MG tablet TAKE 2 TABLET TWICE A DAY BY MOUTH X 2 DAYS FOR FLARES.  0     vitamin B complex with vitamin C (VITAMIN  B COMPLEX) TABS tablet Take 1 tablet by mouth daily         Social History     Tobacco Use     Smoking status: Former Smoker     Packs/day: 0.00     Quit date: 3/27/1975     Years since quittin.8     Smokeless tobacco: Never Used   Substance Use Topics     Alcohol use: Yes     Comment: occasionally     Drug use: No        ROS:  General: no fever, chills  Weight: stable  Vision:stable. Last eye exam 6 months ago.  ENT: negative  Respiratory negative.  Cardiac: no chest pain or pressure  Abdominal: no nausea, vomiting, abdominal pain, bowel changes  Vascular no complaints of claudication  Neurologic:no complaints of neuropathy  Feet no lesions, in grown nails, edema   : no polyuria, hematuria, dysuria    Objective:  Patient alert in NAD  /82 (BP Location: Right arm, Patient Position: Sitting, Cuff Size: Adult Regular)   Pulse 110   Temp 98.3  F (36.8  C) (Oral)   Resp 16   Ht 1.664 m (5' 5.5\")   Wt 79.7 kg (175 lb 11.2 oz)   SpO2 96%   BMI 28.79 kg/m         Wt Readings from Last 4 Encounters:   21 79.7 kg (175 lb 11.2 oz)   09/15/20 80.7 kg (178 lb)   20 81 kg (178 lb 9.2 oz)   07/10/20 80.7 kg (178 lb)       CV: CV: normal S1, S2 without murmur, S3 or S4.  Carotid pulses: full  LUNGS: clear      Lab Results   Component Value Date    A1C 6.1 2021    A1C 6.7 2020    A1C 6.4 2019    A1C 6.5 2019    A1C 6.4 10/09/2018       Lab Results   Component Value Date    CHOL 204 2021    HDL 61 2021     2021    " TRIG 197 01/11/2021    CHOLANUELO 2.6 01/14/2015

## 2021-02-10 PROBLEM — M25.512 ACUTE PAIN OF LEFT SHOULDER: Status: RESOLVED | Noted: 2020-09-22 | Resolved: 2021-02-10

## 2021-02-10 PROBLEM — M67.912 DYSFUNCTION OF LEFT ROTATOR CUFF: Status: RESOLVED | Noted: 2020-09-22 | Resolved: 2021-02-10

## 2021-02-10 PROBLEM — M19.012 PRIMARY OSTEOARTHRITIS OF LEFT SHOULDER: Status: RESOLVED | Noted: 2020-09-22 | Resolved: 2021-02-10

## 2021-04-05 NOTE — PROGRESS NOTES
[facility] Initial Evaluation  Subjective:    Therapist Generated HPI Evaluation  Problem details: Lata is being seen today for physical therapy for suspected rotator cuff tear after a fall on 8/24/20.  After the fall she had severe pain and could not raise the arm.  Had an injection by Dr. Sheikh which has helped the pain and her self maangement for pain has been alternating ibuprofen and tylenol..         Type of problem:  Left shoulder.    This is a new condition.  Condition occurred with:  A fall.  Where condition occurred: at home.  Patient reports pain:  Upper arm.  Pain is described as aching and sharp (10/10) and is intermittent.  Pain is the same all the time.  Since onset symptoms are unchanged.  Associated symptoms:  Numbness (left hand). Exacerbated by: reaching, lifting, carrying, louis on left side, pushing, pulling.  Relieved by: rest, injeciton, ice, ibuprofen, tylenol.  Special tests included:  X-ray.  Past treatment: injection. There was moderate improvement following previous treatment.                          Objective:  System                   Shoulder Evaluation:  ROM:  AROM:    Flexion:  Left:  35    Right:  120    Abduction:  Left: 45   Right:  110      External Rotation:  Left:  Wnl    Right:  Wnl                PROM:    Flexion:  Left:  110          Abduction:  Left:  80        Internal Rotation:  Left:  Wnl      External Rotation:  Left:  Wnl                        Strength:    Flexion: Left:1+/5   Pain:    Right: 4/5     Pain:     Abduction:  Left: 2/5  Pain:    Right: 4/5     Pain:    Internal Rotation:  Left:2/5     Pain:    Right: 5/5     Pain:  External Rotation:   Left:3+/5     Pain:   Right:4/5     Pain:            Stability Testing:  not assessed      Special Tests:  Special tests assessed shoulder: positive drop arm test, positive belly press.  Left shoulder positive for the following special tests:  Rotator cuff tear    Right shoulder negative for the  following special tests:Rotator cuff tear  Palpation:  not assessed      Mobility Tests:  not assessed                                                 General     ROS    Assessment/Plan:    Patient is a 85 year old female with left side shoulder complaints.    Patient has the following significant findings with corresponding treatment plan.                Diagnosis 1:  Left Shoulder Pain, Rotator Cuff Tear, suspected Supraspinatus and Subscapularis involvement    Pain -  hot/cold therapy, self management, education and home program  Decreased ROM/flexibility - manual therapy, therapeutic exercise, therapeutic activity and home program  Decreased joint mobility - manual therapy and therapeutic exercise  Decreased strength - therapeutic exercise and therapeutic activities  Impaired muscle performance - neuro re-education  Decreased function - therapeutic activities  Impaired posture - neuro re-education    Therapy Evaluation Codes:   1) History comprised of:   Personal factors that impact the plan of care:      Age.    Comorbidity factors that impact the plan of care are:      Asthma, Diabetes and High blood pressure.     Medications impacting care: High blood pressure.  2) Examination of Body Systems comprised of:   Body structures and functions that impact the plan of care:      Shoulder.   Activity limitations that impact the plan of care are:      Bathing, Cooking, Driving, Dressing, Lifting, Sleeping, Laying down and reaching.  3) Clinical presentation characteristics are:   Evolving/Changing.  4) Decision-Making    Low complexity using standardized patient assessment instrument and/or measureable assessment of functional outcome.  Cumulative Therapy Evaluation is: Low complexity.    Previous and current functional limitations:  (See Goal Flow Sheet for this information)    Short term and Long term goals: (See Goal Flow Sheet for this information)     Communication ability:  Patient appears to be able to clearly  communicate and understand verbal and written communication and follow directions correctly.  Treatment Explanation - The following has been discussed with the patient:   RX ordered/plan of care  Anticipated outcomes  Possible risks and side effects  This patient would benefit from PT intervention to resume normal activities.   Rehab potential is good.    Frequency:  1 X week, once daily  Duration:  for 6 weeks  Discharge Plan:  Achieve all LTG.  Independent in home treatment program.  Reach maximal therapeutic benefit.    Please refer to the daily flowsheet for treatment today, total treatment time and time spent performing 1:1 timed codes.        CONSTANZA

## 2021-05-07 ENCOUNTER — TRANSFERRED RECORDS (OUTPATIENT)
Dept: HEALTH INFORMATION MANAGEMENT | Facility: CLINIC | Age: 86
End: 2021-05-07

## 2021-05-07 LAB — RETINOPATHY: NEGATIVE

## 2021-05-20 ENCOUNTER — TRANSFERRED RECORDS (OUTPATIENT)
Dept: HEALTH INFORMATION MANAGEMENT | Facility: CLINIC | Age: 86
End: 2021-05-20

## 2021-05-20 LAB — RETINOPATHY: NEGATIVE

## 2021-06-01 ENCOUNTER — OFFICE VISIT (OUTPATIENT)
Dept: OBGYN | Facility: CLINIC | Age: 86
End: 2021-06-01
Payer: COMMERCIAL

## 2021-06-01 VITALS
DIASTOLIC BLOOD PRESSURE: 80 MMHG | BODY MASS INDEX: 28.28 KG/M2 | HEIGHT: 66 IN | WEIGHT: 176 LBS | SYSTOLIC BLOOD PRESSURE: 142 MMHG | HEART RATE: 90 BPM

## 2021-06-01 DIAGNOSIS — N90.89 VULVAR LESION: Primary | ICD-10-CM

## 2021-06-01 PROCEDURE — 88305 TISSUE EXAM BY PATHOLOGIST: CPT | Performed by: PATHOLOGY

## 2021-06-01 PROCEDURE — 99203 OFFICE O/P NEW LOW 30 MIN: CPT | Mod: 25 | Performed by: OBSTETRICS & GYNECOLOGY

## 2021-06-01 PROCEDURE — 56605 BIOPSY OF VULVA/PERINEUM: CPT | Performed by: OBSTETRICS & GYNECOLOGY

## 2021-06-01 ASSESSMENT — MIFFLIN-ST. JEOR: SCORE: 1247.14

## 2021-06-01 NOTE — PROGRESS NOTES
SUBJECTIVE:                                                   Norma F Alpers is a 86 year old female  who presents to clinic today for a left sided vulvar lesion. This has been present for at least a decade, but has been bothering her a little in the last several years, when she started to wear mini pads for occasional urinary incontinence. She wears Depends at night.    Prior vulvar dermatoses diagnosed? NO  Itching? NO  Fissures, sores, lesions? YES left labia  Discharge? NO  Odor? NO    Pain or itching is: localized    Treatments tried: None    Problem list and histories reviewed & adjusted, as indicated.  Additional history: as documented.    Patient Active Problem List   Diagnosis     Advanced directives, counseling/discussion     Benign essential hypertension     Hyperlipidemia LDL goal <100     GERD (gastroesophageal reflux disease)     Aphasic stroke     Osteopenia     Acquired hypothyroidism     Anxiety     Thyroid nodule     Type 2 diabetes mellitus without complication, without long-term current use of insulin (H)     Irritable bowel syndrome with both constipation and diarrhea     Major depressive disorder with single episode, in partial remission (H)     Exudative age-related macular degeneration of right eye with inactive scar (H)     Fall, initial encounter     Past Surgical History:   Procedure Laterality Date     APPENDECTOMY      Ruptured --peritonitis     COCCYGECTOMY  2011    Zuleyka Vernell     HERNIA REPAIR      Abdomen     HYSTERECTOMY, PAP NO LONGER INDICATED       RELEASE TRIGGER FINGER  2014    Procedure: RELEASE TRIGGER FINGER;  Release Trigger Thumb ;  Surgeon: Gurpreet Mast MD;  Location:  OR     [facility] NONSPECIFIC PROCEDURE      Left foot surgery      Social History     Tobacco Use     Smoking status: Former Smoker     Packs/day: 0.00     Quit date: 3/27/1975     Years since quittin.2     Smokeless tobacco: Never Used   Substance Use Topics     Alcohol use: Yes      Comment: occasionally      Problem (# of Occurrences) Relation (Name,Age of Onset)    Alcohol/Drug (1) Father:  age 80, stroke    C.A.D. (1) Brother:  age 46    Cardiovascular (1) Mother:  age 92, CHF, DM    Diabetes (2) Brother:  age 30, DM, Sister: Born 1932, also HTN, lipids            albuterol (PROAIR HFA/PROVENTIL HFA/VENTOLIN HFA) 108 (90 Base) MCG/ACT inhaler, Inhale 2 puffs into the lungs every 6 hours  amLODIPine (NORVASC) 5 MG tablet, Take 1 tablet (5 mg) by mouth daily  aspirin 325 MG tablet, Take  by mouth daily.  blood glucose monitoring (ACCU-CHEK SMARTVIEW) test strip, Use to test blood sugar 2-3 times daily or as directed.  Calcium Carbonate (CALCIUM 600 PO), Take 1 tablet by mouth daily 2 times daily  cetirizine (ZYRTEC) 10 MG tablet, Take 10 mg by mouth daily as needed for allergies  EPINEPHrine (EPIPEN) 0.3 MG/0.3ML injection, Inject 0.3 mLs (0.3 mg) into the muscle once as needed for anaphylaxis  glimepiride (AMARYL) 1 MG tablet, TAKE 1 TABLET (1 MG) BY MOUTH EVERY MORNING (BEFORE BREAKFAST)  glucosamine-chondroitinoitin 750-600 MG TABS, Take 1 tablet by mouth 2 times daily.    hydrochlorothiazide (HYDRODIURIL) 25 MG tablet, Take 0.5 tablets (12.5 mg) by mouth daily  levothyroxine (SYNTHROID/LEVOTHROID) 75 MCG tablet, Take 1 tablet (75 mcg) by mouth every morning  Multiple Vitamins-Minerals (PRESERVISION AREDS 2 PO), Take 1 tablet by mouth daily  omega-3 fatty acids (FISH OIL) 1200 MG capsule, Take 1 capsule by mouth daily.  potassium chloride ER (KLOR-CON) 20 MEQ CR tablet, TAKE 1 TABLET BY MOUTH 2 TIMES DAILY  pravastatin (PRAVACHOL) 80 MG tablet, TAKE 1 TABLET BY MOUTH EVERY DAY  psyllium (METAMUCIL) 58.6 % POWD, Take by mouth daily  raloxifene (EVISTA) 60 MG tablet, TAKE 1 TABLET BY MOUTH EVERY DAY  sertraline (ZOLOFT) 100 MG tablet, Take 1 tablet (100 mg) by mouth daily  thin (NO BRAND SPECIFIED) lancets, 1 each daily  traZODone (DESYREL) 150 MG tablet, Take 1 tablet  (150 mg) by mouth At Bedtime  TURMERIC PO, Take 2 tablets by mouth daily   valACYclovir (VALTREX) 500 MG tablet, TAKE 2 TABLET TWICE A DAY BY MOUTH X 2 DAYS FOR FLARES.  vitamin B complex with vitamin C (VITAMIN  B COMPLEX) TABS tablet, Take 1 tablet by mouth daily    methylPREDNISolone (DEPO-MEDROL) injection 40 mg      Allergies   Allergen Reactions     Bee Venom      Throat closes     No Clinical Screening - See Comments Shortness Of Breath     SOB at dentist office - thinks it was novacaine     Hydralazine      palpitations     Lisinopril      angioedema     Penicillin G Swelling     Seasonal Allergies        Further ROS:Skin: No rashes,worrisome lesions or skin problems      OBJECTIVE:     Exam:  Constitutional:  Appearance: Well nourished, well developed alert, in no acute distress    Mons pubis Normal   Groin Normal   Labia majora Abnormal: sebaceous cyst left labia with dark area, appears scabbed, overlying the firm lesion. No erythema or discharge noted.   Perineum Normal   Anus Normal   Labia minora Normal   Prepuce Normal   Clitoris Normal   Intralabial folds Normal   Vestibule Abnormal: atrophic   Urethral meatus Normal   Vagina Abnormal: atrophic   Pelvic floor tone normal     Discussed need for vulvar biopsy. Consent was signed.    Betadine prep was used, then 1 cc of 1% lidocaine injected for anesthesia. A 6 mm Rojas punch was used to obtain a biopsy specimen from the edge of the discolored lesion, and hemostasis was obtained with Drysol. She tolerated this well.        In-Clinic Test Results:  No results found for this or any previous visit (from the past 24 hour(s)).    ASSESSMENT/PLAN:                                                        ICD-10-CM    1. Vulvar lesion  N90.89 Surgical pathology exam         Suspicion for malignancy is low, but will await biopsy results. If benign, she can come back for removal in clinic. This will require suture for closure, and we can then remove these sutures  around 7-10 days. I will follow up when results are back.    Gita Espinoza MD  [facility]

## 2021-06-01 NOTE — NURSING NOTE
"Chief Complaint   Patient presents with     Vaginal Problem     Cyst in external vaginal area x 10yrs. Recently has become irritated with use of pads.        Initial BP (!) 142/80   Pulse 90   Ht 1.664 m (5' 5.5\")   Wt 79.8 kg (176 lb)   Breastfeeding No   BMI 28.84 kg/m   Estimated body mass index is 28.84 kg/m  as calculated from the following:    Height as of this encounter: 1.664 m (5' 5.5\").    Weight as of this encounter: 79.8 kg (176 lb).  BP completed using cuff size: regular    Questioned patient about current smoking habits.  Pt. has never smoked.          The following HM Due: NONE      The following patient reported/Care Every where data was sent to:  P ABSTRACT QUALITY INITIATIVES [[id number]]  Gay Gomez LPN               "

## 2021-06-03 LAB — COPATH REPORT: NORMAL

## 2021-07-02 DIAGNOSIS — F41.9 ANXIETY: ICD-10-CM

## 2021-07-02 RX ORDER — SERTRALINE HYDROCHLORIDE 100 MG/1
TABLET, FILM COATED ORAL
Qty: 90 TABLET | Refills: 1 | Status: SHIPPED | OUTPATIENT
Start: 2021-07-02 | End: 2021-12-17

## 2021-07-02 NOTE — TELEPHONE ENCOUNTER
Pending Prescriptions:                       Disp   Refills    sertraline (ZOLOFT) 100 MG tablet [Pharmac*90 tab*1        Sig: TAKE 1 TABLET BY MOUTH EVERY DAY    Routing refill request to provider for review/approval because:  Drug interaction warning

## 2021-07-03 DIAGNOSIS — I10 BENIGN ESSENTIAL HYPERTENSION: ICD-10-CM

## 2021-07-03 DIAGNOSIS — E11.9 TYPE 2 DIABETES MELLITUS WITHOUT COMPLICATION, WITHOUT LONG-TERM CURRENT USE OF INSULIN (H): ICD-10-CM

## 2021-07-05 RX ORDER — GLIMEPIRIDE 1 MG/1
TABLET ORAL
Qty: 90 TABLET | Refills: 1 | Status: SHIPPED | OUTPATIENT
Start: 2021-07-05 | End: 2021-12-17

## 2021-07-05 RX ORDER — AMLODIPINE BESYLATE 5 MG/1
TABLET ORAL
Qty: 90 TABLET | Refills: 1 | Status: SHIPPED | OUTPATIENT
Start: 2021-07-05 | End: 2021-12-17

## 2021-07-05 NOTE — TELEPHONE ENCOUNTER
Pending Prescriptions:                       Disp   Refills    glimepiride (AMARYL) 1 MG tablet [Pharmacy*90 tab*3        Sig: TAKE 1 TABLET (1 MG) BY MOUTH EVERY MORNING (BEFORE           BREAKFAST)    amLODIPine (NORVASC) 5 MG tablet [Pharmacy*90 tab*3        Sig: TAKE 1 TABLET BY MOUTH EVERY DAY    Routing refill request to provider for review/approval because:  Lab Results   Component Value Date    A1C 6.1 01/11/2021    A1C 6.7 07/07/2020    A1C 6.4 09/20/2019    A1C 6.5 03/04/2019    A1C 6.4 10/09/2018     BP Readings from Last 3 Encounters:   06/01/21 (!) 142/80   01/18/21 126/82   09/15/20 (!) 140/86

## 2021-07-07 DIAGNOSIS — G47.00 INSOMNIA, UNSPECIFIED TYPE: ICD-10-CM

## 2021-07-08 RX ORDER — TRAZODONE HYDROCHLORIDE 150 MG/1
TABLET ORAL
Qty: 90 TABLET | Refills: 1 | Status: SHIPPED | OUTPATIENT
Start: 2021-07-08 | End: 2021-12-21

## 2021-07-08 NOTE — TELEPHONE ENCOUNTER
Pending Prescriptions:                       Disp   Refills    traZODone (DESYREL) 150 MG tablet [Pharmac*90 tab*1        Sig: TAKE 1 TABLET BY MOUTH AT BEDTIME    Routing refill request to provider for review/approval because:  Drug interaction warning

## 2021-07-13 ENCOUNTER — HOSPITAL ENCOUNTER (OUTPATIENT)
Dept: MAMMOGRAPHY | Facility: CLINIC | Age: 86
Discharge: HOME | End: 2021-07-13
Attending: INTERNAL MEDICINE | Admitting: INTERNAL MEDICINE
Payer: COMMERCIAL

## 2021-07-13 DIAGNOSIS — Z12.31 OTHER SCREENING MAMMOGRAM: ICD-10-CM

## 2021-07-13 PROCEDURE — 77063 BREAST TOMOSYNTHESIS BI: CPT

## 2021-07-23 DIAGNOSIS — E78.5 HYPERLIPIDEMIA LDL GOAL <100: ICD-10-CM

## 2021-07-23 RX ORDER — PRAVASTATIN SODIUM 80 MG/1
TABLET ORAL
Qty: 90 TABLET | Refills: 0 | Status: SHIPPED | OUTPATIENT
Start: 2021-07-23 | End: 2021-09-28

## 2021-07-23 NOTE — TELEPHONE ENCOUNTER
Pending Prescriptions:                       Disp   Refills    pravastatin (PRAVACHOL) 80 MG tablet [Pha*90 tab*1            Sig: TAKE 1 TABLET BY MOUTH EVERY DAY    Prescription approved per [facility] Refill Protocol.

## 2021-07-24 DIAGNOSIS — E03.9 ACQUIRED HYPOTHYROIDISM: ICD-10-CM

## 2021-07-26 ENCOUNTER — LAB (OUTPATIENT)
Dept: LAB | Facility: CLINIC | Age: 86
End: 2021-07-26
Payer: COMMERCIAL

## 2021-07-26 DIAGNOSIS — I10 BENIGN ESSENTIAL HYPERTENSION: ICD-10-CM

## 2021-07-26 DIAGNOSIS — E11.9 TYPE 2 DIABETES MELLITUS WITHOUT COMPLICATION, WITHOUT LONG-TERM CURRENT USE OF INSULIN (H): ICD-10-CM

## 2021-07-26 DIAGNOSIS — E03.9 ACQUIRED HYPOTHYROIDISM: ICD-10-CM

## 2021-07-26 LAB — HBA1C MFR BLD: 5.9 % (ref 0–5.6)

## 2021-07-26 PROCEDURE — 84443 ASSAY THYROID STIM HORMONE: CPT

## 2021-07-26 PROCEDURE — 36415 COLL VENOUS BLD VENIPUNCTURE: CPT

## 2021-07-26 PROCEDURE — 83036 HEMOGLOBIN GLYCOSYLATED A1C: CPT

## 2021-07-26 PROCEDURE — 80048 BASIC METABOLIC PNL TOTAL CA: CPT

## 2021-07-26 RX ORDER — LEVOTHYROXINE SODIUM 75 UG/1
75 TABLET ORAL EVERY MORNING
Qty: 90 TABLET | Refills: 3 | Status: SHIPPED | OUTPATIENT
Start: 2021-07-26 | End: 2022-04-07

## 2021-07-26 NOTE — TELEPHONE ENCOUNTER
Pending Prescriptions:                       Disp   Refills    levothyroxine (SYNTHROID/LEVOTHROID) 75 MC*90 tab*3        Sig: Take 1 tablet (75 mcg) by mouth every morning    Routing refill request to provider for review/approval because:  TSH   Date Value Ref Range Status   07/07/2020 1.58 0.40 - 4.00 mU/L Final

## 2021-07-27 LAB
ANION GAP SERPL CALCULATED.3IONS-SCNC: 5 MMOL/L (ref 3–14)
BUN SERPL-MCNC: 15 MG/DL (ref 7–30)
CALCIUM SERPL-MCNC: 9.9 MG/DL (ref 8.5–10.1)
CHLORIDE BLD-SCNC: 105 MMOL/L (ref 94–109)
CO2 SERPL-SCNC: 28 MMOL/L (ref 20–32)
CREAT SERPL-MCNC: 0.75 MG/DL (ref 0.52–1.04)
GFR SERPL CREATININE-BSD FRML MDRD: 72 ML/MIN/1.73M2
GLUCOSE BLD-MCNC: 121 MG/DL (ref 70–99)
POTASSIUM BLD-SCNC: 4.1 MMOL/L (ref 3.4–5.3)
SODIUM SERPL-SCNC: 138 MMOL/L (ref 133–144)
TSH SERPL DL<=0.005 MIU/L-ACNC: 1.83 MU/L (ref 0.4–4)

## 2021-07-29 ENCOUNTER — OFFICE VISIT (OUTPATIENT)
Dept: OBGYN | Facility: CLINIC | Age: 86
End: 2021-07-29
Payer: COMMERCIAL

## 2021-07-29 VITALS
HEIGHT: 66 IN | SYSTOLIC BLOOD PRESSURE: 132 MMHG | HEART RATE: 68 BPM | DIASTOLIC BLOOD PRESSURE: 70 MMHG | WEIGHT: 176 LBS | BODY MASS INDEX: 28.28 KG/M2

## 2021-07-29 DIAGNOSIS — N90.89 VULVAR LESION: Primary | ICD-10-CM

## 2021-07-29 PROCEDURE — 56501 DESTROY VULVA LESIONS SIM: CPT | Performed by: OBSTETRICS & GYNECOLOGY

## 2021-07-29 ASSESSMENT — MIFFLIN-ST. JEOR: SCORE: 1247.14

## 2021-07-29 NOTE — NURSING NOTE
"Chief Complaint   Patient presents with     Follow Up     Vulvar biopsy       Initial /70   Pulse 68   Ht 1.664 m (5' 5.5\")   Wt 79.8 kg (176 lb)   Breastfeeding No   BMI 28.84 kg/m   Estimated body mass index is 28.84 kg/m  as calculated from the following:    Height as of this encounter: 1.664 m (5' 5.5\").    Weight as of this encounter: 79.8 kg (176 lb).  BP completed using cuff size: regular    Questioned patient about current smoking habits.  Pt. has never smoked.          The following HM Due: NONE      The following patient reported/Care Every where data was sent to:  P ABSTRACT QUALITY INITIATIVES [[id number]]  Gay Gomez LPN             "

## 2021-07-29 NOTE — PROGRESS NOTES
Here for vulvar excision of cyst. Risks, benefits, and alternatives explained, consent signed.    Patient was in the dorsal lithotomy position.    The cyst was identified in the left labia majora. Betadine prep was used, then 3 cc of 1% lidocaine injected for anesthesia. A 10 blade scalpel was used to excise the cyst and surrounding skin. The defect was closed with 3 interrupted 3-0 vicryl sutures. She tolerated this well.    Postop instructions discussed. She will return in 10 days for exam and if needed, suture removal. She knows I am away then, but she cannot come next week as she will be out of town to see her sister in [place], who unfortunately has a terminal illness.    Gita Espinoza MD

## 2021-08-02 ENCOUNTER — VIRTUAL VISIT (OUTPATIENT)
Dept: INTERNAL MEDICINE | Facility: CLINIC | Age: 86
End: 2021-08-02
Payer: COMMERCIAL

## 2021-08-02 DIAGNOSIS — G89.29 CHRONIC BILATERAL LOW BACK PAIN WITHOUT SCIATICA: ICD-10-CM

## 2021-08-02 DIAGNOSIS — E78.5 HYPERLIPIDEMIA LDL GOAL <100: ICD-10-CM

## 2021-08-02 DIAGNOSIS — F32.4 MAJOR DEPRESSIVE DISORDER WITH SINGLE EPISODE, IN PARTIAL REMISSION (H): ICD-10-CM

## 2021-08-02 DIAGNOSIS — M54.50 CHRONIC BILATERAL LOW BACK PAIN WITHOUT SCIATICA: ICD-10-CM

## 2021-08-02 DIAGNOSIS — M85.80 OSTEOPENIA, UNSPECIFIED LOCATION: ICD-10-CM

## 2021-08-02 DIAGNOSIS — E11.9 TYPE 2 DIABETES MELLITUS WITHOUT COMPLICATION, WITHOUT LONG-TERM CURRENT USE OF INSULIN (H): Primary | ICD-10-CM

## 2021-08-02 DIAGNOSIS — R26.89 IMBALANCE: ICD-10-CM

## 2021-08-02 DIAGNOSIS — E03.9 ACQUIRED HYPOTHYROIDISM: ICD-10-CM

## 2021-08-02 DIAGNOSIS — H35.3213 EXUDATIVE AGE-RELATED MACULAR DEGENERATION OF RIGHT EYE WITH INACTIVE SCAR (H): ICD-10-CM

## 2021-08-02 DIAGNOSIS — I10 BENIGN ESSENTIAL HYPERTENSION: ICD-10-CM
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
## 2021-08-02 LAB
PATH REPORT.COMMENTS IMP SPEC: NORMAL
PATH REPORT.COMMENTS IMP SPEC: NORMAL
PATH REPORT.FINAL DX SPEC: NORMAL
PATH REPORT.GROSS SPEC: NORMAL
PATH REPORT.MICROSCOPIC SPEC OTHER STN: NORMAL
PATH REPORT.RELEVANT HX SPEC: NORMAL
PHOTO IMAGE: NORMAL

## 2021-08-02 PROCEDURE — 88304 TISSUE EXAM BY PATHOLOGIST: CPT | Performed by: PATHOLOGY

## 2021-08-02 PROCEDURE — 99442 PR PHYSICIAN TELEPHONE EVALUATION 11-20 MIN: CPT | Performed by: INTERNAL MEDICINE

## 2021-08-02 RX ORDER — RALOXIFENE HYDROCHLORIDE 60 MG/1
1 TABLET, FILM COATED ORAL DAILY
Qty: 90 TABLET | Refills: 3 | Status: SHIPPED | OUTPATIENT
Start: 2021-08-02 | End: 2022-04-07

## 2021-08-02 RX ORDER — POTASSIUM CHLORIDE 1500 MG/1
TABLET, EXTENDED RELEASE ORAL
Qty: 180 TABLET | Refills: 3 | Status: SHIPPED | OUTPATIENT
Start: 2021-08-02 | End: 2022-04-07

## 2021-08-02 ASSESSMENT — PATIENT HEALTH QUESTIONNAIRE - PHQ9
SUM OF ALL RESPONSES TO PHQ QUESTIONS 1-9: 7
5. POOR APPETITE OR OVEREATING: SEVERAL DAYS

## 2021-08-02 ASSESSMENT — ANXIETY QUESTIONNAIRES
IF YOU CHECKED OFF ANY PROBLEMS ON THIS QUESTIONNAIRE, HOW DIFFICULT HAVE THESE PROBLEMS MADE IT FOR YOU TO DO YOUR WORK, TAKE CARE OF THINGS AT HOME, OR GET ALONG WITH OTHER PEOPLE: SOMEWHAT DIFFICULT
5. BEING SO RESTLESS THAT IT IS HARD TO SIT STILL: NOT AT ALL
7. FEELING AFRAID AS IF SOMETHING AWFUL MIGHT HAPPEN: NOT AT ALL
2. NOT BEING ABLE TO STOP OR CONTROL WORRYING: NEARLY EVERY DAY
1. FEELING NERVOUS, ANXIOUS, OR ON EDGE: SEVERAL DAYS
6. BECOMING EASILY ANNOYED OR IRRITABLE: NOT AT ALL
GAD7 TOTAL SCORE: 7
3. WORRYING TOO MUCH ABOUT DIFFERENT THINGS: MORE THAN HALF THE DAYS

## 2021-08-02 NOTE — PROGRESS NOTES
"Lata is a 86 year old who is being evaluated via a billable telephone visit.      What phone number would you like to be contacted at? [phone]  How would you like to obtain your AVS? MyChart    Assessment & Plan     Type 2 diabetes mellitus without complication, without long-term current use of insulin (H)  Well-controlled, continue medication, if she starts getting lower sugars, under 80 or frequent symptoms might consider stopping or decreasing glimepiride     Major depressive disorder with single episode, in partial remission (H)  Overall mood is well controlled, some acute grief issues but do not need to change her medication.    Exudative age-related macular degeneration of right eye with inactive scar (H)  Continues to get regular injections and overall vision fairly stable but is affecting her balance    Benign essential hypertension  Well-controlled by her reports and most recent blood pressure reading was good  - potassium chloride ER (KLOR-CON) 20 MEQ CR tablet; TAKE 1 TABLET BY MOUTH 2 TIMES DAILY    Hyperlipidemia LDL goal <100  Stable, continue medication    Osteopenia, unspecified location  Stable, continue medication  - raloxifene (EVISTA) 60 MG tablet; Take 1 tablet (60 mg) by mouth daily    Acquired hypothyroidism  Stable, continue current dose    Chronic bilateral low back pain without sciatica  We will order a walker    Imbalance  Order a walker           BMI:   Estimated body mass index is 28.84 kg/m  as calculated from the following:    Height as of 7/29/21: 1.664 m (5' 5.5\").    Weight as of 7/29/21: 79.8 kg (176 lb).           Return in about 6 months (around 2/2/2022).    Adelita Kennedy MD  [facility]    Subjective   Lata is a 86 year old who presents for the following health issues     HPI     Diabetes Follow-up  Sugars are usually 100-125.  She has not had any low blood sugar  How often are you checking your blood sugar? A few times a week  What time of day " are you checking your blood sugars (select all that apply)?  Before meals  Have you had any blood sugars above 200?  No  Have you had any blood sugars below 70?  No    What symptoms do you notice when your blood sugar is low?  None    What concerns do you have today about your diabetes? None     Do you have any of these symptoms? (Select all that apply)  No numbness or tingling in feet.  No redness, sores or blisters on feet.  No complaints of excessive thirst.  No reports of blurry vision.  No significant changes to weight.        Hyperlipidemia Follow-Up      Are you regularly taking any medication or supplement to lower your cholesterol?   Yes- pravastatin    Are you having muscle aches or other side effects that you think could be caused by your cholesterol lowering medication?  No    Hypertension Follow-up      Do you check your blood pressure regularly outside of the clinic? Yes     Are you following a low salt diet? No    Are your blood pressures ever more than 140 on the top number (systolic) OR more   than 90 on the bottom number (diastolic), for example 140/90? No    BP Readings from Last 2 Encounters:   07/29/21 132/70   06/01/21 (!) 142/80     Hemoglobin A1C (%)   Date Value   07/26/2021 5.9 (H)   01/11/2021 6.1 (H)   07/07/2020 6.7 (H)     LDL Cholesterol Calculated (mg/dL)   Date Value   01/11/2021 104 (H)   09/20/2019 111 (H)       Depression and Anxiety Follow-Up    How are you doing with your depression since your last visit? Worsened because of her sister's health decline, does not feel severe enough to change her medication    How are you doing with your anxiety since your last visit?  Improved     Are you having other symptoms that might be associated with depression or anxiety? No    Have you had a significant life event? OTHER: Sister is in hospice     Do you have any concerns with your use of alcohol or other drugs? No      PHQ 9/30/2019 5/29/2020 8/2/2021   PHQ-9 Total Score 8 6 7   Q9:  Thoughts of better off dead/self-harm past 2 weeks Not at all Not at all Not at all     JENNY-7 SCORE 12/12/2019 5/29/2020 8/2/2021   Total Score 8 8 7       Suicide Assessment Five-step Evaluation and Treatment (SAFE-T)      How many servings of fruits and vegetables do you eat daily?  0-1    On average, how many sweetened beverages do you drink each day (Examples: soda, juice, sweet tea, etc.  Do NOT count diet or artificially sweetened beverages)?   0    How many days per week do you exercise enough to make your heart beat faster? 3 or less    How many minutes a day do you exercise enough to make your heart beat faster? 9 or less    How many days per week do you miss taking your medication? 0'      Other problems:  1.  Macular degeneration: Overall staying fairly stable but does still affect her vision.  2.  Osteopenia: Stable on Evista  3.  Hypothyroidism: Clinically stable, labs stable    Current concerns:  She reports she is been having more issues with her balance.  She was previously using a cane but now it is not really helping her stay upright, she has fallen using the cane.  She has started using her 's walker occasionally when he is not using it and that seems to work a lot better.  Factors in her need for the walker are primarily imbalance, she thinks her vision has a lot to do with that, less depth perception with her macular degeneration.  She also has chronic back pain with some impact on her legs.  Some of it may have been related to her previous stroke.  She finds that she does tire with walking so would like a seat on her walker.  Her 's walker has brakes and wheels and she feels that works well for her as she would like to be able to use brakes which she stops and stands still, the walker is more stable.  It is also easier for her to push on wheels rather than lifting and moving.    Patient Active Problem List   Diagnosis     Advanced directives, counseling/discussion     Benign  essential hypertension     Hyperlipidemia LDL goal <100     GERD (gastroesophageal reflux disease)     Aphasic stroke     Osteopenia     Acquired hypothyroidism     Anxiety     Thyroid nodule     Type 2 diabetes mellitus without complication, without long-term current use of insulin (H)     Irritable bowel syndrome with both constipation and diarrhea     Major depressive disorder with single episode, in partial remission (H)     Exudative age-related macular degeneration of right eye with inactive scar (H)     Current Outpatient Medications   Medication Sig Dispense Refill     albuterol (PROAIR HFA/PROVENTIL HFA/VENTOLIN HFA) 108 (90 Base) MCG/ACT inhaler Inhale 2 puffs into the lungs every 6 hours 1 Inhaler 11     amLODIPine (NORVASC) 5 MG tablet TAKE 1 TABLET BY MOUTH EVERY DAY 90 tablet 1     aspirin 325 MG tablet Take  by mouth daily.       blood glucose monitoring (ACCU-CHEK SMARTVIEW) test strip Use to test blood sugar 2-3 times daily or as directed. 100 strip prn     Calcium Carbonate (CALCIUM 600 PO) Take 1 tablet by mouth daily 2 times daily       cetirizine (ZYRTEC) 10 MG tablet Take 10 mg by mouth daily as needed for allergies       EPINEPHrine (EPIPEN) 0.3 MG/0.3ML injection Inject 0.3 mLs (0.3 mg) into the muscle once as needed for anaphylaxis 2 each 5     glimepiride (AMARYL) 1 MG tablet TAKE 1 TABLET (1 MG) BY MOUTH EVERY MORNING (BEFORE BREAKFAST) 90 tablet 1     glucosamine-chondroitinoitin 750-600 MG TABS Take 1 tablet by mouth 2 times daily.         hydrochlorothiazide (HYDRODIURIL) 25 MG tablet Take 0.5 tablets (12.5 mg) by mouth daily 45 tablet 3     levothyroxine (SYNTHROID/LEVOTHROID) 75 MCG tablet TAKE 1 TABLET (75 MCG) BY MOUTH EVERY MORNING 90 tablet 3     Multiple Vitamins-Minerals (PRESERVISION AREDS 2 PO) Take 1 tablet by mouth daily       omega-3 fatty acids (FISH OIL) 1200 MG capsule Take 1 capsule by mouth daily.       potassium chloride ER (KLOR-CON) 20 MEQ CR tablet TAKE 1 TABLET BY  MOUTH 2 TIMES DAILY 180 tablet 3     pravastatin (PRAVACHOL) 80 MG tablet TAKE 1 TABLET BY MOUTH EVERY DAY 90 tablet 0     psyllium (METAMUCIL) 58.6 % POWD Take by mouth daily       raloxifene (EVISTA) 60 MG tablet TAKE 1 TABLET BY MOUTH EVERY DAY 90 tablet 3     sertraline (ZOLOFT) 100 MG tablet TAKE 1 TABLET BY MOUTH EVERY DAY 90 tablet 1     thin (NO BRAND SPECIFIED) lancets 1 each daily 100 each 4     traZODone (DESYREL) 150 MG tablet TAKE 1 TABLET BY MOUTH AT BEDTIME 90 tablet 1     TURMERIC PO Take 2 tablets by mouth daily        valACYclovir (VALTREX) 500 MG tablet TAKE 2 TABLET TWICE A DAY BY MOUTH X 2 DAYS FOR FLARES.  0     vitamin B complex with vitamin C (VITAMIN  B COMPLEX) TABS tablet Take 1 tablet by mouth daily        Social History     Tobacco Use     Smoking status: Former Smoker     Packs/day: 0.00     Quit date: 3/27/1975     Years since quittin.3     Smokeless tobacco: Never Used   Substance Use Topics     Alcohol use: Yes     Comment: occasionally     Drug use: No        Review of Systems         Objective           Vitals:  No vitals were obtained today due to virtual visit.    Physical Exam     PSYCH: Alert and oriented times 3; coherent speech, normal   rate and volume, able to articulate logical thoughts, able   to abstract reason, no tangential thoughts, no hallucinations   or delusions  Her affect is normal  RESP: No cough, no audible wheezing, able to talk in full sentences  Remainder of exam unable to be completed due to telephone visits          Phone call duration: 17 minutes

## 2021-08-03 ASSESSMENT — ANXIETY QUESTIONNAIRES: GAD7 TOTAL SCORE: 7

## 2021-08-16 ENCOUNTER — OFFICE VISIT (OUTPATIENT)
Dept: OBGYN | Facility: CLINIC | Age: 86
End: 2021-08-16
Payer: COMMERCIAL

## 2021-08-16 VITALS — BODY MASS INDEX: 28.61 KG/M2 | DIASTOLIC BLOOD PRESSURE: 80 MMHG | SYSTOLIC BLOOD PRESSURE: 136 MMHG | WEIGHT: 174.6 LBS

## 2021-08-16 DIAGNOSIS — Z01.419 NORMAL PELVIC EXAM: ICD-10-CM

## 2021-08-16 DIAGNOSIS — Z48.02 VISIT FOR SUTURE REMOVAL: Primary | ICD-10-CM

## 2021-08-16 PROCEDURE — 99212 OFFICE O/P EST SF 10 MIN: CPT | Performed by: OBSTETRICS & GYNECOLOGY

## 2021-08-16 NOTE — NURSING NOTE
"Chief Complaint   Patient presents with     Suture Removal     patient had vulvar biopsy on 21. Patient reports that she had a lot of bleeding at the time of the bx, but has not had any since. No c/o of pain or discharge       Initial /80   Wt 79.2 kg (174 lb 9.6 oz)   BMI 28.61 kg/m   Estimated body mass index is 28.61 kg/m  as calculated from the following:    Height as of 21: 1.664 m (5' 5.5\").    Weight as of this encounter: 79.2 kg (174 lb 9.6 oz).  BP completed using cuff size: regular    Questioned patient about current smoking habits.  Pt. quit smoking some time ago.          The following HM Due: NONE      Sanford Wright CMA               "

## 2021-08-16 NOTE — PROGRESS NOTES
Annual pelvic exam  Suture removal     Ms. Norma F Alpers 86 year old presents for suture removal from a repaired vulvar biopsy she had done with Dr. Espinoza on 7/29. She reports sutures have not dissolved yet and they have become irritating. She denies bleeding. Final pathology of vulvar biopsy was benign (epidermal inclusion cyst).   She is accompanied by her daughter. Her daughter inquires also about general pelvic exam for her mother since she has not had one in a long time.   Of note, patient has had a total hysterectomy back in 1997 for abnormal uterine bleeding. She is uncertain as to whether her ovaries were removed or not.      /80   Wt 79.2 kg (174 lb 9.6 oz)   BMI 28.61 kg/m      General Appearance: NAD  Pelvic: Exam chaperoned by nurse. Normal external female genitalia.  No external lesions, normal hair distribution, no adenopathy. Suture noted at the left vulva but biopsy wound has healed well. Speculum exam reveals atrophic vaginal epithelium with no abnormal discharge. Vaginal cuff appears smooth, pink, with no visible lesions. Bimanual exam reveals no pelvic masses palpated. Uterus is surgically absent.     Procedure: Suture removal   Patient was placed in dorsal lithotomy position. Using the suture kit removal, the suture were excised without difficulty. The biopsy site was well healed.       A/P:   1) Suture removal       2) Pelvic exam       Tej Sinha MD  [facility]

## 2021-08-30 ENCOUNTER — NURSE TRIAGE (OUTPATIENT)
Dept: INTERNAL MEDICINE | Facility: CLINIC | Age: 86
End: 2021-08-30

## 2021-08-30 ENCOUNTER — APPOINTMENT (OUTPATIENT)
Dept: CT IMAGING | Facility: CLINIC | Age: 86
DRG: 392 | End: 2021-08-30
Attending: EMERGENCY MEDICINE
Payer: COMMERCIAL

## 2021-08-30 ENCOUNTER — HOSPITAL ENCOUNTER (INPATIENT)
Facility: CLINIC | Age: 86
LOS: 4 days | Discharge: HOME | DRG: 392 | End: 2021-09-03
Attending: EMERGENCY MEDICINE | Admitting: INTERNAL MEDICINE
Payer: COMMERCIAL

## 2021-08-30 DIAGNOSIS — K57.32 SIGMOID DIVERTICULITIS: ICD-10-CM

## 2021-08-30 LAB
ALBUMIN UR-MCNC: 10 MG/DL
ANION GAP SERPL CALCULATED.3IONS-SCNC: 8 MMOL/L (ref 3–14)
APPEARANCE UR: CLEAR
BASOPHILS # BLD AUTO: 0 10E3/UL (ref 0–0.2)
BASOPHILS NFR BLD AUTO: 0 %
BILIRUB UR QL STRIP: NEGATIVE
BUN SERPL-MCNC: 18 MG/DL (ref 7–30)
CALCIUM SERPL-MCNC: 9.9 MG/DL (ref 8.5–10.1)
CHLORIDE BLD-SCNC: 107 MMOL/L (ref 94–109)
CO2 SERPL-SCNC: 24 MMOL/L (ref 20–32)
COLOR UR AUTO: ABNORMAL
CREAT SERPL-MCNC: 0.61 MG/DL (ref 0.52–1.04)
EOSINOPHIL # BLD AUTO: 0.1 10E3/UL (ref 0–0.7)
EOSINOPHIL NFR BLD AUTO: 0 %
ERYTHROCYTE [DISTWIDTH] IN BLOOD BY AUTOMATED COUNT: 15 % (ref 10–15)
GFR SERPL CREATININE-BSD FRML MDRD: 82 ML/MIN/1.73M2
GLUCOSE BLD-MCNC: 125 MG/DL (ref 70–99)
GLUCOSE BLDC GLUCOMTR-MCNC: 129 MG/DL (ref 70–99)
GLUCOSE BLDC GLUCOMTR-MCNC: 137 MG/DL (ref 70–99)
GLUCOSE UR STRIP-MCNC: NEGATIVE MG/DL
HCT VFR BLD AUTO: 40.8 % (ref 35–47)
HGB BLD-MCNC: 13 G/DL (ref 11.7–15.7)
HGB UR QL STRIP: NEGATIVE
HOLD SPECIMEN: NORMAL
IMM GRANULOCYTES # BLD: 0.1 10E3/UL
IMM GRANULOCYTES NFR BLD: 0 %
KETONES UR STRIP-MCNC: NEGATIVE MG/DL
LACTATE SERPL-SCNC: 1 MMOL/L (ref 0.7–2)
LEUKOCYTE ESTERASE UR QL STRIP: ABNORMAL
LYMPHOCYTES # BLD AUTO: 2.3 10E3/UL (ref 0.8–5.3)
LYMPHOCYTES NFR BLD AUTO: 13 %
MCH RBC QN AUTO: 29 PG (ref 26.5–33)
MCHC RBC AUTO-ENTMCNC: 31.9 G/DL (ref 31.5–36.5)
MCV RBC AUTO: 91 FL (ref 78–100)
MONOCYTES # BLD AUTO: 1.1 10E3/UL (ref 0–1.3)
MONOCYTES NFR BLD AUTO: 6 %
MUCOUS THREADS #/AREA URNS LPF: PRESENT /LPF
NEUTROPHILS # BLD AUTO: 14.5 10E3/UL (ref 1.6–8.3)
NEUTROPHILS NFR BLD AUTO: 81 %
NITRATE UR QL: NEGATIVE
NRBC # BLD AUTO: 0 10E3/UL
NRBC BLD AUTO-RTO: 0 /100
PH UR STRIP: 5.5 [PH] (ref 5–7)
PLATELET # BLD AUTO: 229 10E3/UL (ref 150–450)
POTASSIUM BLD-SCNC: 3.5 MMOL/L (ref 3.4–5.3)
RBC # BLD AUTO: 4.49 10E6/UL (ref 3.8–5.2)
RBC URINE: <1 /HPF
SARS-COV-2 RNA RESP QL NAA+PROBE: NEGATIVE
SODIUM SERPL-SCNC: 139 MMOL/L (ref 133–144)
SP GR UR STRIP: 1.01 (ref 1–1.03)
SQUAMOUS EPITHELIAL: <1 /HPF
UROBILINOGEN UR STRIP-MCNC: NORMAL MG/DL
WBC # BLD AUTO: 18 10E3/UL (ref 4–11)
WBC URINE: 4 /HPF

## 2021-08-30 PROCEDURE — 250N000011 HC RX IP 250 OP 636: Performed by: EMERGENCY MEDICINE

## 2021-08-30 PROCEDURE — 250N000009 HC RX 250: Performed by: EMERGENCY MEDICINE

## 2021-08-30 PROCEDURE — 36415 COLL VENOUS BLD VENIPUNCTURE: CPT | Performed by: EMERGENCY MEDICINE

## 2021-08-30 PROCEDURE — 99207 PR NO BILLABLE SERVICE THIS VISIT: CPT | Performed by: PHYSICIAN ASSISTANT

## 2021-08-30 PROCEDURE — 80048 BASIC METABOLIC PNL TOTAL CA: CPT | Performed by: EMERGENCY MEDICINE

## 2021-08-30 PROCEDURE — 81001 URINALYSIS AUTO W/SCOPE: CPT | Performed by: EMERGENCY MEDICINE

## 2021-08-30 PROCEDURE — 96365 THER/PROPH/DIAG IV INF INIT: CPT

## 2021-08-30 PROCEDURE — 96375 TX/PRO/DX INJ NEW DRUG ADDON: CPT

## 2021-08-30 PROCEDURE — 87040 BLOOD CULTURE FOR BACTERIA: CPT | Performed by: EMERGENCY MEDICINE

## 2021-08-30 PROCEDURE — 250N000011 HC RX IP 250 OP 636: Performed by: PHYSICIAN ASSISTANT

## 2021-08-30 PROCEDURE — 87635 SARS-COV-2 COVID-19 AMP PRB: CPT | Performed by: EMERGENCY MEDICINE

## 2021-08-30 PROCEDURE — 120N000001 HC R&B MED SURG/OB

## 2021-08-30 PROCEDURE — 258N000003 HC RX IP 258 OP 636: Performed by: EMERGENCY MEDICINE

## 2021-08-30 PROCEDURE — 83605 ASSAY OF LACTIC ACID: CPT | Performed by: EMERGENCY MEDICINE

## 2021-08-30 PROCEDURE — 99285 EMERGENCY DEPT VISIT HI MDM: CPT | Mod: 25

## 2021-08-30 PROCEDURE — 99223 1ST HOSP IP/OBS HIGH 75: CPT | Performed by: INTERNAL MEDICINE

## 2021-08-30 PROCEDURE — 74177 CT ABD & PELVIS W/CONTRAST: CPT

## 2021-08-30 PROCEDURE — 85025 COMPLETE CBC W/AUTO DIFF WBC: CPT | Performed by: EMERGENCY MEDICINE

## 2021-08-30 PROCEDURE — 96368 THER/DIAG CONCURRENT INF: CPT

## 2021-08-30 PROCEDURE — 258N000003 HC RX IP 258 OP 636: Performed by: PHYSICIAN ASSISTANT

## 2021-08-30 PROCEDURE — C9803 HOPD COVID-19 SPEC COLLECT: HCPCS

## 2021-08-30 RX ORDER — CIPROFLOXACIN 2 MG/ML
400 INJECTION, SOLUTION INTRAVENOUS ONCE
Status: COMPLETED | OUTPATIENT
Start: 2021-08-30 | End: 2021-08-30

## 2021-08-30 RX ORDER — ACETAMINOPHEN 650 MG/1
650 SUPPOSITORY RECTAL EVERY 6 HOURS PRN
Status: DISCONTINUED | OUTPATIENT
Start: 2021-08-30 | End: 2021-09-03 | Stop reason: HOSPADM

## 2021-08-30 RX ORDER — HYDROMORPHONE HCL IN WATER/PF 6 MG/30 ML
0.2 PATIENT CONTROLLED ANALGESIA SYRINGE INTRAVENOUS EVERY 4 HOURS PRN
Status: DISCONTINUED | OUTPATIENT
Start: 2021-08-30 | End: 2021-09-03 | Stop reason: HOSPADM

## 2021-08-30 RX ORDER — NICOTINE POLACRILEX 4 MG
15-30 LOZENGE BUCCAL
Status: DISCONTINUED | OUTPATIENT
Start: 2021-08-30 | End: 2021-09-03 | Stop reason: HOSPADM

## 2021-08-30 RX ORDER — HYDROCHLOROTHIAZIDE 25 MG/1
12.5 TABLET ORAL DAILY PRN
COMMUNITY
End: 2023-05-10

## 2021-08-30 RX ORDER — HYDROMORPHONE HCL IN WATER/PF 6 MG/30 ML
0.2 PATIENT CONTROLLED ANALGESIA SYRINGE INTRAVENOUS
Status: DISCONTINUED | OUTPATIENT
Start: 2021-08-30 | End: 2021-08-30

## 2021-08-30 RX ORDER — PROCHLORPERAZINE 25 MG
12.5 SUPPOSITORY, RECTAL RECTAL EVERY 12 HOURS PRN
Status: DISCONTINUED | OUTPATIENT
Start: 2021-08-30 | End: 2021-09-03 | Stop reason: HOSPADM

## 2021-08-30 RX ORDER — PROCHLORPERAZINE MALEATE 5 MG
5 TABLET ORAL EVERY 6 HOURS PRN
Status: DISCONTINUED | OUTPATIENT
Start: 2021-08-30 | End: 2021-09-03 | Stop reason: HOSPADM

## 2021-08-30 RX ORDER — NALOXONE HYDROCHLORIDE 0.4 MG/ML
0.4 INJECTION, SOLUTION INTRAMUSCULAR; INTRAVENOUS; SUBCUTANEOUS
Status: DISCONTINUED | OUTPATIENT
Start: 2021-08-30 | End: 2021-09-03 | Stop reason: HOSPADM

## 2021-08-30 RX ORDER — NALOXONE HYDROCHLORIDE 0.4 MG/ML
0.2 INJECTION, SOLUTION INTRAMUSCULAR; INTRAVENOUS; SUBCUTANEOUS
Status: DISCONTINUED | OUTPATIENT
Start: 2021-08-30 | End: 2021-09-03 | Stop reason: HOSPADM

## 2021-08-30 RX ORDER — LIDOCAINE 40 MG/G
CREAM TOPICAL
Status: DISCONTINUED | OUTPATIENT
Start: 2021-08-30 | End: 2021-09-03 | Stop reason: HOSPADM

## 2021-08-30 RX ORDER — SODIUM CHLORIDE 9 MG/ML
INJECTION, SOLUTION INTRAVENOUS ONCE
Status: COMPLETED | OUTPATIENT
Start: 2021-08-30 | End: 2021-08-30

## 2021-08-30 RX ORDER — ACETAMINOPHEN 325 MG/1
650 TABLET ORAL EVERY 6 HOURS PRN
Status: DISCONTINUED | OUTPATIENT
Start: 2021-08-30 | End: 2021-09-03 | Stop reason: HOSPADM

## 2021-08-30 RX ORDER — ONDANSETRON 4 MG/1
4 TABLET, ORALLY DISINTEGRATING ORAL EVERY 6 HOURS PRN
Status: DISCONTINUED | OUTPATIENT
Start: 2021-08-30 | End: 2021-09-03 | Stop reason: HOSPADM

## 2021-08-30 RX ORDER — ACETAMINOPHEN 500 MG
500 TABLET ORAL EVERY 6 HOURS PRN
COMMUNITY

## 2021-08-30 RX ORDER — IOPAMIDOL 755 MG/ML
500 INJECTION, SOLUTION INTRAVASCULAR ONCE
Status: COMPLETED | OUTPATIENT
Start: 2021-08-30 | End: 2021-08-30

## 2021-08-30 RX ORDER — ONDANSETRON 2 MG/ML
4 INJECTION INTRAMUSCULAR; INTRAVENOUS
Status: DISCONTINUED | OUTPATIENT
Start: 2021-08-30 | End: 2021-08-30

## 2021-08-30 RX ORDER — DEXTROSE MONOHYDRATE 25 G/50ML
25-50 INJECTION, SOLUTION INTRAVENOUS
Status: DISCONTINUED | OUTPATIENT
Start: 2021-08-30 | End: 2021-09-03 | Stop reason: HOSPADM

## 2021-08-30 RX ORDER — CIPROFLOXACIN 2 MG/ML
400 INJECTION, SOLUTION INTRAVENOUS EVERY 12 HOURS
Status: DISCONTINUED | OUTPATIENT
Start: 2021-08-30 | End: 2021-09-02

## 2021-08-30 RX ORDER — ONDANSETRON 2 MG/ML
4 INJECTION INTRAMUSCULAR; INTRAVENOUS EVERY 6 HOURS PRN
Status: DISCONTINUED | OUTPATIENT
Start: 2021-08-30 | End: 2021-09-03 | Stop reason: HOSPADM

## 2021-08-30 RX ORDER — DEXTROSE MONOHYDRATE, SODIUM CHLORIDE, AND POTASSIUM CHLORIDE 50; 1.49; 4.5 G/1000ML; G/1000ML; G/1000ML
INJECTION, SOLUTION INTRAVENOUS CONTINUOUS
Status: DISCONTINUED | OUTPATIENT
Start: 2021-08-30 | End: 2021-08-31

## 2021-08-30 RX ADMIN — ONDANSETRON 4 MG: 2 INJECTION INTRAMUSCULAR; INTRAVENOUS at 12:26

## 2021-08-30 RX ADMIN — SODIUM CHLORIDE 62 ML: 9 INJECTION, SOLUTION INTRAVENOUS at 11:40

## 2021-08-30 RX ADMIN — IOPAMIDOL 88 ML: 755 INJECTION, SOLUTION INTRAVENOUS at 11:40

## 2021-08-30 RX ADMIN — CIPROFLOXACIN 400 MG: 2 INJECTION, SOLUTION INTRAVENOUS at 21:55

## 2021-08-30 RX ADMIN — SODIUM CHLORIDE: 9 INJECTION, SOLUTION INTRAVENOUS at 12:30

## 2021-08-30 RX ADMIN — METRONIDAZOLE 500 MG: 500 INJECTION, SOLUTION INTRAVENOUS at 23:23

## 2021-08-30 RX ADMIN — POTASSIUM CHLORIDE, DEXTROSE MONOHYDRATE AND SODIUM CHLORIDE: 150; 5; 450 INJECTION, SOLUTION INTRAVENOUS at 15:33

## 2021-08-30 RX ADMIN — CIPROFLOXACIN 400 MG: 2 INJECTION, SOLUTION INTRAVENOUS at 12:26

## 2021-08-30 RX ADMIN — METRONIDAZOLE 500 MG: 500 INJECTION, SOLUTION INTRAVENOUS at 13:42

## 2021-08-30 ASSESSMENT — ENCOUNTER SYMPTOMS
VOMITING: 0
NAUSEA: 1
FEVER: 0
ABDOMINAL PAIN: 1
ROS GI COMMENTS: POSITIVE FOR BLACK STOOLS
CARDIOVASCULAR NEGATIVE: 1
DIARRHEA: 1

## 2021-08-30 ASSESSMENT — ACTIVITIES OF DAILY LIVING (ADL)
ADLS_ACUITY_SCORE: 14
ADLS_ACUITY_SCORE: 14

## 2021-08-30 ASSESSMENT — MIFFLIN-ST. JEOR: SCORE: 1231.95

## 2021-08-30 NOTE — ED NOTES
"BC collected by myself with other blood work. Lab just collected second set. Abx started after lab yadira second set.     Pt declines pain meds at this time. Pain currently 2/10. \"I don't do well with pain medicine\"   "

## 2021-08-30 NOTE — TELEPHONE ENCOUNTER
"    Reason for Disposition    SEVERE abdominal pain (e.g., excruciating)    Answer Assessment - Initial Assessment Questions  1. LOCATION: \"Where does it hurt?\"       Lower abdoman  2. RADIATION: \"Does the pain shoot anywhere else?\" (e.g., chest, back)      Radiates back and forth in the lower area  3. ONSET: \"When did the pain begin?\" (e.g., minutes, hours or days ago)       1 wk ago  4. SUDDEN: \"Gradual or sudden onset?\"      Unsure but she thinks it was suddenly  5. PATTERN \"Does the pain come and go, or is it constant?\"     - If constant: \"Is it getting better, staying the same, or worsening?\"       (Note: Constant means the pain never goes away completely; most serious pain is constant and it progresses)      - If intermittent: \"How long does it last?\" \"Do you have pain now?\"      (Note: Intermittent means the pain goes away completely between bouts)      Comes and goes, can't sleep at night  6. SEVERITY: \"How bad is the pain?\"  (e.g., Scale 1-10; mild, moderate, or severe)    - MILD (1-3): doesn't interfere with normal activities, abdomen soft and not tender to touch     - MODERATE (4-7): interferes with normal activities or awakens from sleep, tender to touch     - SEVERE (8-10): excruciating pain, doubled over, unable to do any normal activities       9/10  7. RECURRENT SYMPTOM: \"Have you ever had this type of abdominal pain before?\" If so, ask: \"When was the last time?\" and \"What happened that time?\"       Has had a history of diverticulitis in the past  8. CAUSE: \"What do you think is causing the abdominal pain?\"      Unsure but did recently eat some corn  9. RELIEVING/AGGRAVATING FACTORS: \"What makes it better or worse?\" (e.g., movement, antacids, bowel movement)      nothing  10. OTHER SYMPTOMS: \"Has there been any vomiting, diarrhea, constipation, or urine problems?\"        Loose stools several times a day, last week the stools were very dark but now brown but no blood   11. PREGNANCY: \"Is there any " "chance you are pregnant?\" \"When was your last menstrual period?\"        n/a    Protocols used: ABDOMINAL PAIN - FEMALE-A-OH      "

## 2021-08-30 NOTE — H&P
[facility] Hospitalist Admission Note  Name: Norma F Alpers    MRN: [id number]  YOB: 1934    Age: 86 year old  Date of admission: 8/30/2021  Primary care provider: Adelita Kennedy        Assessment & Plan   Norma F Alpers is a 86 year old female with PMhx significant for GERD, hyperlipidemia, hypertension, thyroid nodule, CVA, type 2 diabetes mellitus, depression, chronic bilateral back pain, macular degeneration, who was admitted on 8/30/2021 with diverticulitis after presenting with abdominal pain, nausea, loose stools.    #Acute Diverticulitis:  Pt with 7-10 days of progressive lower abdominal discomfort, loose/dark stool, afebrile.   Labs with neutrophilic leukocytosis to 18k. Hgb WNL and lactic acid 1.0.    CT Abdomen pelvis with contrast with acute diverticulitis at the mid sigmoid colon with adjacent edema and small intramural abscess, no extraluminal abscess, small fluid extending posteriorly inferiorly extending to the adjacent anterior bladder with wall thickening per radiology possible for developing fistula.  No reported hx of GI bleeding or diverticula. Last Colonoscopy was in 2005.   At this juncture may be complicated diverticulitis with small intramural abscess and possible associated lower GI tract bleeding given history of darker stools. No e/o of active or acute hemorrhage currently as hemoglobin, blood pressures are stable.  -monitor stools for hematochezia, monitor urine output given concern for possible developing fistula. At this time does not seem to have urinary tract involvement with lack of pneumaturia, fecaluria. UA is pending.   -continue IV abx with flagyl, ciprofloxacin   -consider consult from Colorectal surgery if failing to improve with IV abx and supportive treatment  -trend hgb, check type and cross  -loosely NPO until symptoms improving, could trial clear liquids tonight or in the a.m.  -monitor fever curve, cbc    Addendum:  -d/w Dr. Mckeon requesting  "consultation from CRS     #Type II DM: A1c of 5.9, managed on oral antihyperglycemics.  -I suspect she will not required insulin, have ordered as needed sliding scale  -Point-of-care glucose checks  -Hold oral antihyperglycemic's  -D5 while n.p.o. and fluids, if hyperglycemic discontinue    #HTN: resume amlodipine with hold parameters. Pt takes prior hydrochlorothiazide as needed. Hold.   #Hypothyroidism: resume levothyroxine.   #Depression: with insomnia. Resume trazdone with hold parameters. Resume ZOlofrt.  #Ostoporosis: hold pta Evista.     Awaiting formal pharmacy reconciliation to resume home medications.     DVT Prophylaxis: Pneumatic Compression Devices  Code Status: DNR / DNI  Expected discharge: 2-3 days to prior living arrangement ([facility]) once on oral abx, symptoms improving     COVID PCR STATUS: Pending, asymptomatic. No precautions. S/p full vaccination.      Carmen Nunez PA-C    Primary Care Physician   Adelita Kennedy    Chief Complaint   \"abdominal pain\"    History is obtained from the patient   Services Used: No    History of Present Illness   Norma F Alpers is a 86 year old female with PMhx significant for GERD, hyperlipidemia, hypertension, thyroid nodule, CVA, type 2 diabetes mellitus, depression, chronic bilateral back pain, macular degeneration, who presents with abdominal pain.    Ms. Alpers presented after 7-10 days of ongoing abdominal discomfort.  She stated that she first noticed its onset after consuming sweet corn, which she \"blamed\" her initial symptom development on.   Her pain started in the lower abdominal quadrants and was intermittent at onset.  This morning the pain became so progressive and severe that she called the nursing triage at her primary care clinic who recommended presentation to the emergency department.  She has been having a darker color to her stools since pain onset.  She denies any chio hematochezia.  She last had a bowel movement the day " prior to presentation which was described as loose and watery.  Has had no fever or vomiting.  Nausea and pain have limited appetite. She was able however to tolerate a boost shake prior to coming to the ED.    She has history of some baseline incontinence.  No dysuria or hematuria. No known air in urine or stool.    In the ED afebrile.  Blood pressure 114/70, pulse 85.  Respiratory rate 18, oxygen saturation 95% on room air.  Lab work notable for a neutrophilic leukocytosis to 18k. BMP WNL and lactic acid 1.0   CT Abdomen pelvis with contrast with acute diverticulitis at the mid sigmoid colon with adjacent edema and small intramural mural abscess, no extraluminal abscess, small fluid extending posteriorly inferiorly extending to the adjacent anterior bladder with wall thickening per radiology possible for developing fistula.    Discussed with Dr. Gonzalez in the ED, full chart review including lab work, imaging, and vital signs were reviewed. Patient received Flagyl, Cipro, Dilaudid and Zofran in the ED. Admission was requested to hospitalist service.      Past Medical History    I have reviewed this patient's medical history and updated it with pertinent information if needed.   Past Medical History:   Diagnosis Date     CVA (cerebral infarction) 06/11/2012     Diabetes mellitus (H)      GERD (gastroesophageal reflux disease)      Hyperlipidemia      Hypertension      Thyroid nodule 4/9/2015     Unspecified cerebral artery occlusion with cerebral infarction 6/2012    no residual - was aphasic for awhile after CVA       Past Surgical History   I have reviewed this patient's surgical history and updated it with pertinent information if needed.  Past Surgical History:   Procedure Laterality Date     APPENDECTOMY      Ruptured --peritonitis     COCCYGECTOMY  1/2011    Zuleyka Vernell     HERNIA REPAIR      Abdomen     HYSTERECTOMY, PAP NO LONGER INDICATED  1997     RELEASE TRIGGER FINGER  4/2/2014    Procedure: RELEASE  TRIGGER FINGER;  Release Trigger Thumb ;  Surgeon: Gurpreet Mast MD;  Location:  OR     [facility] NONSPECIFIC PROCEDURE      Left foot surgery       Prior to Admission Medications   Prior to Admission Medications   Prescriptions Last Dose Informant Patient Reported? Taking?   Calcium Carbonate (CALCIUM 600 PO)   Yes No   Sig: Take 1 tablet by mouth daily 2 times daily   EPINEPHrine (EPIPEN) 0.3 MG/0.3ML injection   No No   Sig: Inject 0.3 mLs (0.3 mg) into the muscle once as needed for anaphylaxis   Multiple Vitamins-Minerals (PRESERVISION AREDS 2 PO)   Yes No   Sig: Take 1 tablet by mouth daily   TURMERIC PO   Yes No   Sig: Take 2 tablets by mouth daily    albuterol (PROAIR HFA/PROVENTIL HFA/VENTOLIN HFA) 108 (90 Base) MCG/ACT inhaler   No No   Sig: Inhale 2 puffs into the lungs every 6 hours   amLODIPine (NORVASC) 5 MG tablet   No No   Sig: TAKE 1 TABLET BY MOUTH EVERY DAY   aspirin 325 MG tablet   Yes No   Sig: Take  by mouth daily.   blood glucose monitoring (ACCU-CHEK SMARTVIEW) test strip   No No   Sig: Use to test blood sugar 2-3 times daily or as directed.   cetirizine (ZYRTEC) 10 MG tablet   Yes No   Sig: Take 10 mg by mouth daily as needed for allergies   glimepiride (AMARYL) 1 MG tablet   No No   Sig: TAKE 1 TABLET (1 MG) BY MOUTH EVERY MORNING (BEFORE BREAKFAST)   glucosamine-chondroitinoitin 750-600 MG TABS   Yes No   Sig: Take 1 tablet by mouth 2 times daily.     hydrochlorothiazide (HYDRODIURIL) 25 MG tablet   No No   Sig: Take 0.5 tablets (12.5 mg) by mouth daily   levothyroxine (SYNTHROID/LEVOTHROID) 75 MCG tablet   No No   Sig: TAKE 1 TABLET (75 MCG) BY MOUTH EVERY MORNING   omega-3 fatty acids (FISH OIL) 1200 MG capsule   Yes No   Sig: Take 1 capsule by mouth daily.   potassium chloride ER (KLOR-CON) 20 MEQ CR tablet   No No   Sig: TAKE 1 TABLET BY MOUTH 2 TIMES DAILY   pravastatin (PRAVACHOL) 80 MG tablet   No No   Sig: TAKE 1 TABLET BY MOUTH EVERY DAY   psyllium (METAMUCIL) 58.6 % POWD    Yes No   Sig: Take by mouth daily   raloxifene (EVISTA) 60 MG tablet   No No   Sig: Take 1 tablet (60 mg) by mouth daily   sertraline (ZOLOFT) 100 MG tablet   No No   Sig: TAKE 1 TABLET BY MOUTH EVERY DAY   thin (NO BRAND SPECIFIED) lancets   No No   Si each daily   traZODone (DESYREL) 150 MG tablet   No No   Sig: TAKE 1 TABLET BY MOUTH AT BEDTIME   valACYclovir (VALTREX) 500 MG tablet   Yes No   Sig: TAKE 2 TABLET TWICE A DAY BY MOUTH X 2 DAYS FOR FLARES.   vitamin B complex with vitamin C (VITAMIN  B COMPLEX) TABS tablet   Yes No   Sig: Take 1 tablet by mouth daily      Facility-Administered Medications: None     Allergies   Allergies   Allergen Reactions     Bee Venom      Throat closes     No Clinical Screening - See Comments Shortness Of Breath     SOB at dentist office - thinks it was novacaine     Hydralazine      palpitations     Lisinopril      angioedema     Penicillin G Swelling     Seasonal Allergies        Social History   I have reviewed this patient's social history and updated it with pertinent information if needed. Norma F Alpers  reports that she quit smoking about 46 years ago. She smoked 0.00 packs per day. She has never used smokeless tobacco. She reports current alcohol use. She reports that she does not use drugs.    Family History   I have reviewed this patient's family history and updated it with pertinent information if needed.   Family History   Problem Relation Age of Onset     Alcohol/Drug Father          age 80, stroke     Cardiovascular Mother          age 92, CHF, DM     Diabetes Brother          age 30, DM     C.A.D. Brother          age 46     Diabetes Sister         Born 1932, also HTN, lipids       Review of Systems   The 10 point Review of Systems is negative other than noted in the HPI or here.     Physical Exam   Temp: 97.4  F (36.3  C)   BP: (!) 163/83 Pulse: 78   Resp: 18 SpO2: 96 %      Vital Signs with Ranges  Temp:  [97.4  F (36.3  C)] 97.4  F (36.3   C)  Pulse:  [78] 78  Resp:  [18] 18  BP: (163)/(83) 163/83  SpO2:  [96 %] 96 %  0 lbs 0 oz    Constitutional: Awake, alert,  no apparent distress.  Eyes: Conjunctiva and pupils examined and normal.  HEENT: Non traumatic. Moist mucous membranes, normal dentition.  Respiratory: Clear to auscultation bilaterally, no crackles or wheezing.  Cardiovascular: Regular rate and rhythm, normal S1 and S2, and no murmur noted.  GI: Firm, slightly distended, tenderness in lower quadrants diffusely with voluntary guarding.  No rebound tenderness.  Bowel sounds hypoactive.    Lymph/Hematologic: No anterior cervical or supraclavicular adenopathy.  Skin: Warm, dry. No edema.  Musculoskeletal: No gross deformities noted.  No erythema or tenderness. Moving all extremities.  Neurologic: Oriented to self and situation no tremor. Speech is clear. Moving all extremities with symmetrical strength. CN 2-12 grossly intact.  Coordination and sensation intact.   Psychiatric: Appropriate affect.    Data   Data reviewed today:        Imaging:   Recent Results (from the past 24 hour(s))   Abd/pelvis CT,  IV  contrast only TRAUMA / AAA    Narrative    CT ABDOMEN AND PELVIS WITH CONTRAST 8/30/2021 11:50 AM    CLINICAL HISTORY: Diverticulitis, complication suspected.    TECHNIQUE: CT scan of the abdomen and pelvis was performed following  injection of IV contrast. Multiplanar reformats were obtained. Dose  reduction techniques were used.    CONTRAST: 88mL Isovue-370    COMPARISON: CT abdomen and pelvis 7/17/2017.    FINDINGS:   LOWER CHEST: Normal.    HEPATOBILIARY: Normal.    PANCREAS: Normal.    SPLEEN: No acute abnormality. Granulomatous calcifications noted.    ADRENAL GLANDS: Normal.    KIDNEYS/BLADDER: No hydronephrosis or obstructing stone. Incidental  bilateral renal cysts not requiring specific imaging follow-up. No gas  within the bladder. See below bowel section regarding inflammation of  the bladder.    BOWEL: Acute diverticulitis at the  mid sigmoid colon, series 4 image  174. There is adjacent edema. Hypodensity within the posterior sigmoid  colon wall is ill-defined measuring approximately 2.7 x 1 cm, series 4  image 179. No extraluminal abscess identified. There is adjacent trace  fluid at the pelvis. There is small fluid extending posteriorly and  inferiorly from this position to contact the anterior upper bladder  wall, series 4 image 188. There is bladder wall thickening in this  region, image 190. Remainder of the bowel shows no obstruction or  acute inflammation. Appendix not visualized. No signs of appendicitis.    PELVIC ORGANS: Unremarkable.    ADDITIONAL FINDINGS: Scattered vascular calcifications. No suspicious  lymph node.    MUSCULOSKELETAL: Mild spine degenerative change.      Impression    IMPRESSION:   1.  Acute diverticulitis of the mid sigmoid colon. Small hypodensity  within the posterior sigmoid colon wall could be an intramural small  abscess. No extraluminal abscess can be seen. There is also fluid  extending from the diverticulitis causing a degree of tethering and  wall thickening of the adjacent anterior bladder. Developing fistula  is a possibility.  2.  No other acute abnormality.       Recent Labs   Lab 08/30/21  1131   WBC 18.0*   HGB 13.0   MCV 91         POTASSIUM 3.5   CHLORIDE 107   CO2 24   BUN 18   CR 0.61   ANIONGAP 8   HERNAN 9.9   *       Carmen Nunez PA-C on 8/30/2021 at 12:38 PM

## 2021-08-30 NOTE — CONSULTS
"[facility]  Colon and Rectal Surgery Consult Note  Name: Norma F Alpers    MRN: [id number]  YOB: 1934    Age: 86 year old  Date of admission: 8/30/2021  Primary care provider: Adelita Kennedy     Requesting Physician:  Carmen Nunez PA-C  Reason for consult:  Diverticulitis           History of Present Illness:   Norma F Alpers is an 86 year old female with a history of hyperlipidemia, hypertension, thyroid nodule, CVA, type 2 diabetes, depression, seen at the request of Carmen Nunez PA-C, who presents with abdominal pain.    Patient reports that starting about 1 week ago she developed lower abdominal pain that has progressively worsened.  She has had associated symptoms of decreased appetite, bloating, and nausea but no vomiting.  She also noticed that her stool was dark in color about a week ago at the start of her abdominal pain.  The stool has been formed until more recently it has been loose.  She states her last bowel movement was this morning and it was brown in color.    In the ED, patient afebrile and vitals within normal limits.  Labs revealed WBC 18, lactic acid 1.  CT showed \"Acute diverticulitis of the mid sigmoid colon. Small hypodensity  within the posterior sigmoid colon wall could be an intramural small abscess. No extraluminal abscess can be seen. There is also fluid extending from the diverticulitis causing a degree of tethering and wall thickening of the adjacent anterior bladder. Developing fistula is a possibility. No other acute abnormality.\"  She was admitted for further management.    Patient reports her abdominal pain is much better since she was admitted.  No further nausea.  She last passed stool this morning and it was brown.  No bright red blood.  No fecaluria, but she possibly thinks she had pneumaturia this morning.  UA appears clean.  She denies ever needing antibiotics or hospitalization for diverticulitis in the past.  She reports a family history " of diverticulitis.    Colonoscopy History:      Surgical History: Appendectomy, Hysterectomy, Hernia repair            Past Medical History:     Past Medical History:   Diagnosis Date     CVA (cerebral infarction) 2012     Diabetes mellitus (H)      GERD (gastroesophageal reflux disease)      Hyperlipidemia      Hypertension      Thyroid nodule 2015     Unspecified cerebral artery occlusion with cerebral infarction 2012    no residual - was aphasic for awhile after CVA             Past Surgical History:     Past Surgical History:   Procedure Laterality Date     APPENDECTOMY      Ruptured --peritonitis     COCCYGECTOMY  2011    Zuleyka Vernell     HERNIA REPAIR      Abdomen     HYSTERECTOMY, PAP NO LONGER INDICATED       RELEASE TRIGGER FINGER  2014    Procedure: RELEASE TRIGGER FINGER;  Release Trigger Thumb ;  Surgeon: Gurpreet Mast MD;  Location:  OR     [facility] NONSPECIFIC PROCEDURE      Left foot surgery               Social History:     Social History     Tobacco Use     Smoking status: Former Smoker     Packs/day: 0.00     Quit date: 3/27/1975     Years since quittin.4     Smokeless tobacco: Never Used   Substance Use Topics     Alcohol use: Yes     Comment: occasionally             Family History:     Family History   Problem Relation Age of Onset     Alcohol/Drug Father          age 80, stroke     Cardiovascular Mother          age 92, CHF, DM     Diabetes Brother          age 30, DM     C.A.D. Brother          age 46     Diabetes Sister         Born 1932, also HTN, lipids             Allergies:     Allergies   Allergen Reactions     Bee Venom      Throat closes     No Clinical Screening - See Comments Shortness Of Breath     SOB at dentist office - thinks it was novacaine     Hydralazine      palpitations     Lisinopril      angioedema     Penicillin G Swelling     Seasonal Allergies              Medications:       ciprofloxacin  400 mg Intravenous Q12H      "insulin aspart  1-7 Units Subcutaneous TID AC     insulin aspart  1-5 Units Subcutaneous At Bedtime     metroNIDAZOLE  500 mg Intravenous Q12H     [START ON 8/31/2021] pantoprazole (PROTONIX) IV  40 mg Intravenous Daily with breakfast     sodium chloride (PF)  3 mL Intracatheter Q8H             Review of Systems:   A comprehensive greater than 10 system review of systems was carried out.  Pertinent positives and negatives are noted above.  Otherwise negative for contributory info.            Physical Exam:     Blood pressure (!) 142/86, pulse 77, temperature 97.3  F (36.3  C), temperature source Tympanic, resp. rate 18, height 1.651 m (5' 5\"), weight 79.1 kg (174 lb 6.4 oz), SpO2 95 %, not currently breastfeeding.  No intake or output data in the 24 hours ending 08/30/21 1617  EXAM:  GEN: Awake alert and oriented, appears stated age  PULM: Non-labored breathing with normal respiratory effort  CVS: reg rate and rhythm, no peripheral edema  ABD: Softly distended, diffusely tender. No rebound or guarding.  No peritoneal signs.   RECTAL: Rectal exam was deferred.  NEURO: CN II-XII grossly intact  MSK: extremeties with no clubbing, cyanosis or edema; able to ambulate  PSYCH: responsive, alert, cooperative; oriented x3; appropriate mood and affect  EXT/SKIN: inspection reveals no rashes, lesions or ulcers, normal coloring         Data Reviewed:     Results for orders placed or performed during the hospital encounter of 08/30/21   Abd/pelvis CT,  IV  contrast only TRAUMA / AAA    Narrative    CT ABDOMEN AND PELVIS WITH CONTRAST 8/30/2021 11:50 AM    CLINICAL HISTORY: Diverticulitis, complication suspected.    TECHNIQUE: CT scan of the abdomen and pelvis was performed following  injection of IV contrast. Multiplanar reformats were obtained. Dose  reduction techniques were used.    CONTRAST: 88mL Isovue-370    COMPARISON: CT abdomen and pelvis 7/17/2017.    FINDINGS:   LOWER CHEST: Normal.    HEPATOBILIARY: " Normal.    PANCREAS: Normal.    SPLEEN: No acute abnormality. Granulomatous calcifications noted.    ADRENAL GLANDS: Normal.    KIDNEYS/BLADDER: No hydronephrosis or obstructing stone. Incidental  bilateral renal cysts not requiring specific imaging follow-up. No gas  within the bladder. See below bowel section regarding inflammation of  the bladder.    BOWEL: Acute diverticulitis at the mid sigmoid colon, series 4 image  174. There is adjacent edema. Hypodensity within the posterior sigmoid  colon wall is ill-defined measuring approximately 2.7 x 1 cm, series 4  image 179. No extraluminal abscess identified. There is adjacent trace  fluid at the pelvis. There is small fluid extending posteriorly and  inferiorly from this position to contact the anterior upper bladder  wall, series 4 image 188. There is bladder wall thickening in this  region, image 190. Remainder of the bowel shows no obstruction or  acute inflammation. Appendix not visualized. No signs of appendicitis.    PELVIC ORGANS: Unremarkable.    ADDITIONAL FINDINGS: Scattered vascular calcifications. No suspicious  lymph node.    MUSCULOSKELETAL: Mild spine degenerative change.      Impression    IMPRESSION:   1.  Acute diverticulitis of the mid sigmoid colon. Small hypodensity  within the posterior sigmoid colon wall could be an intramural small  abscess. No extraluminal abscess can be seen. There is also fluid  extending from the diverticulitis causing a degree of tethering and  wall thickening of the adjacent anterior bladder. Developing fistula  is a possibility.  2.  No other acute abnormality.    RENY SAGASTUME MD         SYSTEM ID:  [id number]       Recent Labs   Lab 08/30/21  1131   WBC 18.0*   HGB 13.0   HCT 40.8   MCV 91        Recent Labs   Lab 08/30/21  1131      POTASSIUM 3.5   CHLORIDE 107   CO2 24   ANIONGAP 8   *   BUN 18   CR 0.61   GFRESTIMATED 82   HERNAN 9.9     No results for input(s): INR in the last 168 hours.  Recent  Labs   Lab 08/30/21  1308   COLOR Light Yellow   APPEARANCE Clear   URINEGLC Negative   URINEBILI Negative   URINEKETONE Negative   SG 1.010   UBLD Negative   URINEPH 5.5   PROTEIN 10 *   NITRITE Negative   LEUKEST Trace*   RBCU <1   WBCU 4         Assessment and Plan:   Norma F Alpers is an 86 year old female with a history of hyperlipidemia, hypertension, thyroid nodule, CVA, type 2 diabetes, depression who presents with abdominal pain.  CT consistent with diverticulitis with small intramural abscess and possible developing fistula to the bladder.  Afebrile, patient hemodynamically stable.  WBC 18.  No peritoneal signs on exam. No emergent surgery indicated. Continue with conservative medical management including bowel rest, IVF, and IV antibiotics. Briefly discussed intention to avoid urgent surgery as this would likely result in a resection with temporary stoma in setting of acute inflammation. Patient understands and agrees with the plan.     Plan:  1. Admit to hospitalist.  2. Surgery: No emergent surgery indicated.  3. Diet: NPO  4. IV Fluids: Per hospitalist  5. Antibiotics:  IV Cipro & IV Flagyl  6. Medications: Home meds per hospitalist  7. I&O s:  strict I&O s  8. Labs:   - Reviewed  - Ordered: none   9. Imaging:   - Dr. Maria and myself have personally viewed: CT abd/pelvis  - Ordered:  None  10. Activity: ambulate as tolerated, encourage OOB  11. DVT prophylaxis: SCD s  12. This plan has been discussed with Dr. Villegas    Patient specific identified risk factors considered as part of today s evaluation include: Type II Diabetes.    Additional history obtained from patient and chart.  Time spent on consultation: 35 minutes, greater than 50 percent of the total encounter time is spent in counseling and/or coordination of care.          Paula Alaniz PA-C  [facility]  Phone:  [phone]

## 2021-08-30 NOTE — PLAN OF CARE
"End of shift summary- [id number]    A/O. Can be forgetful   Diet: NPO - see order. Small clears   Transfer: SBA with walker to bathroom   Pain: 1-2 in lower abd. Denies intervention. Fine if not moving or pushing on it  Treatment: IVF, IV ABX, GI consult  Discharge Plans: tbd. Lives in [place]         Blood pressure (!) 142/86, pulse 77, temperature 97.3  F (36.3  C), temperature source Tympanic, resp. rate 18, height 1.651 m (5' 5\"), weight 79.1 kg (174 lb 6.4 oz), SpO2 95 %, not currently breastfeeding.   "

## 2021-08-30 NOTE — ED PROVIDER NOTES
History     Chief Complaint:  Abdominal Pain      The history is provided by the patient.      Norma F Alpers is a 86 year old female with a history of diverticulosis, GERD, CVA, and type 2 diabetes mellitus who presents with Abdominal Pain that started about a week ago. She initially thought was diverticulosis, but it feels different form previous episodes. The pain usually starts on one side of the abdomen and moves towards the other, Her stools were initially black when the pain started, but they turned watery 2 days ago. She also reports feeling nauseas today, but denies vomiting. She did not feel any abdominal pain when sitting in the waiting room earlier, but it returned when she laid down on the bed in the room. She has been taking tylenol for her pain and her last dose was yesterday. She has previously had a hysterectomy, appendectomy, and hernia repair on her abdomen. There is no association of fever or  symptoms.       Review of Systems   Constitutional: Negative for fever.   Cardiovascular: Negative.    Gastrointestinal: Positive for abdominal pain, diarrhea and nausea. Negative for vomiting.        Positive for black stools   Genitourinary: Negative.    All other systems reviewed and are negative.        Allergies:  Bee Venom  No Clinical Screening - See Comments  Hydralazine  Lisinopril  Penicillin G  Seasonal Allergies    Medications:    Albuterol   Amlodipine   Cetrizine   Epinephrine   Glimepiride   Hydrochlorothiazide   Synthroid   Pravastatin   Raloxifene   Zoloft  Trazodone   Valtrex     Past Medical History:    Type 2 diabetes mellitus   GERD   HLD   HTN   Thyroid nodule   Unspecified cerebral artery occlusion with cerebral infarction   Exudative age-related macular degeneration   IBS   Major depressive disorder   Thyroid nodule   Osteopenia   Aphasic stroke   Diverticulosis       Past Surgical History:    Appendectomy   Coccygectomy   Hernia repair   Hysterectomy   Trigger finger release  "    Family History:    Father - stroke, alcohol/drug   Mother - CHF, diabetes mellitus   Brother - diabetes mellitus, CAD   Sister - diabetes mellitus, HTN, lipids     Social History:  Patient was unaccompanied to the ED.    Physical Exam     Patient Vitals for the past 24 hrs:   BP Temp Temp src Pulse Resp SpO2 Height Weight   08/30/21 1500 (!) 142/86 97.3  F (36.3  C) Tympanic 77 18 95 % 1.651 m (5' 5\") 79.1 kg (174 lb 6.4 oz)   08/30/21 1300 (!) 144/75 -- -- -- -- 93 % -- --   08/30/21 1130 114/70 -- -- 85 -- 95 % -- --   08/30/21 1115 126/81 -- -- 89 -- 94 % -- --   08/30/21 1016 (!) 163/83 97.4  F (36.3  C) -- 78 18 96 % -- --       Physical Exam  General: Alert, no acute distress  HEENT:  Moist mucous membranes. Conjunctiva normal.  CV:  RRR, no m/r/g, skin warm and well perfused  Pulm:  CTAB, no wheezes/ronchi/rales.  No acute distress, breathing comfortably  GI:  Soft, lower abdominal tenderness with involuntary guarding, nondistended.  Rebound tenderness in lower abdomen.  MSK:  Moving all extremities.  No focal areas of edema, erythema, or tenderness  Skin:  WWP, no rashes, no lower extremity edema, skin color normal, no diaphoresis  Psych:  Well-appearing, normal affect, regular speech    Emergency Department Course   Imaging:  Abd/pelvis CT,  IV  contrast only TRAUMA / AAA  Preliminary Result  IMPRESSION:   1.  Acute diverticulitis of the mid sigmoid colon. Small hypodensity  within the posterior sigmoid colon wall could be an intramural small  abscess. No extraluminal abscess can be seen. There is also fluid  extending from the diverticulitis causing a degree of tethering and  wall thickening of the adjacent anterior bladder. Developing fistula  is a possibility.  2.  No other acute abnormality.  Per Radiology     Laboratory:  BMP: Glucose 125 (H) o/w WNL (Creatinine 0.61)     CBC: WBC 18 (H), HGB 13,      Lactic Acid (Resulted: 1243): 1.0     Asymptomatic COVID-19 Virus (Coronavirus) by PCR " Nasopharyngeal swab: Pending     UA with microscopic: Pending     Emergency Department Course:    Reviewed:  I reviewed nursing notes, vitals, past history and care everywhere    Assessments:  117 I obtained history and examined the patient as noted above.   1207 I rechecked the patient and explained findings.     Consults:   1251 I consulted with Carmen Nunez for Dr. Mckeon of hospitalist services.     Interventions:  1226 Cipro 400mg IV  1226 Zofran 4mg IV  1230 NS IV    Disposition:  The patient was admitted to the hospital under the care of Dr. Mckeon.    Impression & Plan    Medical Decision Makin-year-old female with lower abdominal pain for the last week.  Please see above for details of HPI and exam.  She is afebrile and vitally stable.  She has involuntary guarding and rebound tenderness on exam concerning for perforated viscus.  CT abdomen/pelvis shows sigmoid diverticulitis with possible intramural abscess and possible developing fistula with the bladder.  Labs notable for leukocytosis, normal lactic acid.  Patient started on IV Cipro and IV Flagyl and will be admitted for continued monitoring and care.  No indication at this time for emergent colorectal surgery consultation.  Discussed case with medicine team who accepted the patient for admission.      Covid-19  Norma F Alpers was evaluated during a global COVID-19 pandemic, which necessitated consideration that the patient might be at risk for infection with the SARS-CoV-2 virus that causes COVID-19.   Applicable protocols for evaluation were followed during the patient's care.   COVID-19 was considered as part of the patient's evaluation. The plan for testing is:  a test was obtained during this visit.    Diagnosis:    ICD-10-CM    1. Sigmoid diverticulitis  K57.32     possible small intramural abscess       Scribe Disclosure:  Krishan BENOIT, am serving as a scribe at 11:16 AM on 2021 to document services personally performed by  Ryan Gonzalez MD based on my observations and the provider's statements to me.      Ryan Gonzalez MD  08/30/21 [id number]

## 2021-08-30 NOTE — PHARMACY-ADMISSION MEDICATION HISTORY
Admission medication history interview status for this patient is complete. See [facility] admission navigator for allergy information, prior to admission medications and immunization status.     Medication history interview source(s):Patient  Medication history resources (including written lists, pill bottles, clinic record):EPIC list, Sure Scripts  [facility] [address]    Changes made to PTA medication list:  Added: tylenol, zinc, hydrochlorothiazide prn  Deleted: scheduled hydrochlorothiazide, psyllium daily, valtrex 1000mg bid x 2 days for flares  Changed: turmeric from 2 tabs daily to 1 tab daily per pt    Actions taken by pharmacist (provider contacted, etc):None     Additional medication history information:pt states she stopped taking her hydrochlorothiazide daily about 2 months ago.  Stopped on her own.  Now only takes prn.  MD unaware of this medication change    Medication reconciliation/reorder completed by provider prior to medication history? No      For patients on insulin therapy:N    Prior to Admission medications    Medication Sig Last Dose Taking? Auth Provider   acetaminophen (TYLENOL) 500 MG tablet Take 500 mg by mouth every 6 hours as needed for mild pain Past Week at Unknown time Yes Unknown, Entered By History   albuterol (PROAIR HFA/PROVENTIL HFA/VENTOLIN HFA) 108 (90 Base) MCG/ACT inhaler Inhale 2 puffs into the lungs every 6 hours 8/29/2021 at Unknown time Yes Adelita Kennedy MD   amLODIPine (NORVASC) 5 MG tablet TAKE 1 TABLET BY MOUTH EVERY DAY 8/30/2021 at am Yes Adelita Kennedy MD   aspirin 325 MG tablet Take 325 mg by mouth daily  8/29/2021 at Unknown time Yes Reported, Patient   Calcium Carbonate (CALCIUM 600 PO) Take 1 tablet by mouth daily 2 times daily 8/29/2021 at Unknown time Yes Reported, Patient   cetirizine (ZYRTEC) 10 MG tablet Take 10 mg by mouth daily as needed for allergies  at no recent usage Yes Reported, Patient   EPINEPHrine (EPIPEN) 0.3 MG/0.3ML injection  Inject 0.3 mLs (0.3 mg) into the muscle once as needed for anaphylaxis  at no usage Yes Declan Schmitt MD   glimepiride (AMARYL) 1 MG tablet TAKE 1 TABLET (1 MG) BY MOUTH EVERY MORNING (BEFORE BREAKFAST) 8/30/2021 at am Yes Adelita Kennedy MD   glucosamine-chondroitinoitin 750-600 MG TABS Take 1 tablet by mouth 2 times daily.   8/29/2021 at Unknown time Yes Unknown, Entered By History   hydrochlorothiazide (HYDRODIURIL) 25 MG tablet Take 12.5 mg by mouth daily as needed  at no recent usage Yes Unknown, Entered By History   levothyroxine (SYNTHROID/LEVOTHROID) 75 MCG tablet TAKE 1 TABLET (75 MCG) BY MOUTH EVERY MORNING 8/30/2021 at am Yes Adelita Kennedy MD   Multiple Vitamins-Minerals (PRESERVISION AREDS 2 PO) Take 1 tablet by mouth daily 8/29/2021 at Unknown time Yes Reported, Patient   omega-3 fatty acids (FISH OIL) 1200 MG capsule Take 1 capsule by mouth daily. 8/29/2021 at Unknown time Yes Reported, Patient   potassium chloride ER (KLOR-CON) 20 MEQ CR tablet TAKE 1 TABLET BY MOUTH 2 TIMES DAILY 8/29/2021 at Unknown time Yes Adelita Kennedy MD   pravastatin (PRAVACHOL) 80 MG tablet TAKE 1 TABLET BY MOUTH EVERY DAY 8/29/2021 at pm Yes Adelita Kennedy MD   raloxifene (EVISTA) 60 MG tablet Take 1 tablet (60 mg) by mouth daily 8/29/2021 at Unknown time Yes Adelita Kennedy MD   sertraline (ZOLOFT) 100 MG tablet TAKE 1 TABLET BY MOUTH EVERY DAY 8/29/2021 at Unknown time Yes Adelita Kennedy MD   traZODone (DESYREL) 150 MG tablet TAKE 1 TABLET BY MOUTH AT BEDTIME 8/29/2021 at Unknown time Yes Adelita Kennedy MD   TURMERIC PO Take 1 tablet by mouth daily  8/29/2021 at Unknown time Yes Reported, Patient   vitamin B complex with vitamin C (VITAMIN  B COMPLEX) TABS tablet Take 1 tablet by mouth daily 8/29/2021 at Unknown time Yes Reported, Patient   Zinc Sulfate (ZINC-220 PO) Take 1 tablet by mouth daily as needed  at no recent usage Yes Unknown, Entered By History

## 2021-08-30 NOTE — ED NOTES
[facility]  ED Nurse Handoff Report    Norma F Alpers is a 86 year old female   ED Chief complaint: Abdominal Pain  . ED Diagnosis:   Final diagnoses:   Sigmoid diverticulitis - possible small intramural abscess     Allergies:   Allergies   Allergen Reactions     Bee Venom      Throat closes     No Clinical Screening - See Comments Shortness Of Breath     SOB at dentist office - thinks it was novacaine     Hydralazine      palpitations     Lisinopril      angioedema     Penicillin G Swelling     Seasonal Allergies        Code Status: Full Code  Activity level - Baseline/Home:  Independent Activity Level - Current:   Stand by Assist. Lift room needed: No. Bariatric: No   Needed: No   Isolation: No. Infection: Not Applicable.     Vital Signs:   Vitals:    08/30/21 1016 08/30/21 1115 08/30/21 1130   BP: (!) 163/83 126/81 114/70   Pulse: 78 89 85   Resp: 18     Temp: 97.4  F (36.3  C)     SpO2: 96% 94% 95%       Cardiac Rhythm:  ,      Pain level:    Patient confused: No. Patient Falls Risk: Yes.   Elimination Status: Has not voided in ED  Patient Report - Initial Complaint: abd pain. Focused Assessment: Norma F Alpers is a 86 year old female with a history of diverticulosis, GERD, CVA, and type 2 diabetes mellitus who presents with Abdominal Pain that started about a week ago. She initially thought was diverticulosis, but it feels different form previous episodes. The pain usually starts on one side of the abdomen and moves towards the other, Her stools were initially black when the pain started, but they turned watery 2 days ago. She also reports feeling nauseas today, but denies vomiting. She did not feel any abdominal pain when sitting in the waiting room earlier, but it returned when she laid down on the bed in the room. She has been taking tylenol for her pain and her last dose was yesterday. She has previously had a hysterectomy, appendectomy, and hernia repair on her abdomen. There is no  association of fever or  symptoms.   Tests Performed: CT, labs. Abnormal Results:   Abd/pelvis CT,  IV  contrast only TRAUMA / AAA   Preliminary Result   IMPRESSION:    1.  Acute diverticulitis of the mid sigmoid colon. Small hypodensity   within the posterior sigmoid colon wall could be an intramural small   abscess. No extraluminal abscess can be seen. There is also fluid   extending from the diverticulitis causing a degree of tethering and   wall thickening of the adjacent anterior bladder. Developing fistula   is a possibility.   2.  No other acute abnormality.        Labs Ordered and Resulted from Time of ED Arrival Up to the Time of Departure from the ED   BASIC METABOLIC PANEL - Abnormal; Notable for the following components:       Result Value    Glucose 125 (*)     All other components within normal limits   CBC WITH PLATELETS AND DIFFERENTIAL - Abnormal; Notable for the following components:    WBC Count 18.0 (*)     Absolute Neutrophils 14.5 (*)     Absolute Immature Granulocytes 0.1 (*)     All other components within normal limits   LACTIC ACID WHOLE BLOOD - Normal   CBC WITH PLATELETS & DIFFERENTIAL    Narrative:     The following orders were created for panel order CBC + differential.  Procedure                               Abnormality         Status                     ---------                               -----------         ------                     CBC with platelets and d...[[id number]]  Abnormal            Final result                 Please view results for these tests on the individual orders.   EXTRA BLOOD CULTURE BOTTLE   EXTRA BLUE TOP TUBE   EXTRA RED TOP TUBE   EXTRA GREEN TOP (LITHIUM HEPARIN) TUBE   EXTRA PURPLE TOP TUBE   ROUTINE UA WITH MICROSCOPIC REFLEX TO CULTURE   COVID-19 VIRUS (CORONAVIRUS) BY PCR   BLOOD CULTURE   BLOOD CULTURE   EXTRA TUBE    Narrative:     The following orders were created for panel order Extra Tube ([place] Draw).  Procedure                                Abnormality         Status                     ---------                               -----------         ------                     Extra Blood Culture Bottle[[id number]]                       Final result               Extra Blue Top Tube[[id number]]                              Final result               Extra Red Top Tube[[id number]]                               Final result               Extra Green Top (Lithium...[[id number]]                      Final result               Extra Purple Top Tube[[id number]]                            Final result                 Please view results for these tests on the individual orders.     .   Treatments provided: See MAR  Family Comments: NA  OBS brochure/video discussed/provided to patient:  No  ED Medications:   Medications   HYDROmorphone (DILAUDID) injection 0.2 mg (has no administration in time range)   ciprofloxacin (CIPRO) infusion 400 mg (400 mg Intravenous New Bag 8/30/21 1226)   metroNIDAZOLE (FLAGYL) infusion 500 mg (has no administration in time range)   ondansetron (ZOFRAN) injection 4 mg (4 mg Intravenous Given 8/30/21 1226)   CT Scan Flush (62 mLs Intravenous Given 8/30/21 1140)   iopamidol (ISOVUE-370) solution 500 mL (88 mLs Intravenous Given 8/30/21 1140)   sodium chloride 0.9% infusion ( Intravenous New Bag 8/30/21 1230)     Drips infusing:  Yes, abx. NS @100  For the majority of the shift, the patient's behavior Green. Interventions performed were NA.    Sepsis treatment initiated: No. BC drawn prior to abx.     Patient tested for COVID 19 prior to admission: YES    ED Nurse Name/Phone Number: Alejandra Parr RN,   12:49 PM    RECEIVING UNIT ED HANDOFF REVIEW    Above ED Nurse Handoff Report was reviewed: Yes  Reviewed by: Jose Guadalupe Herrera on August 30, 2021 at 2:45 PM

## 2021-08-30 NOTE — ED TRIAGE NOTES
"Patient presents to the ED reporting intermittent lower abdominal pain x 1 week. States initially had frequent black stools, but now stool is \"watery.\"   "

## 2021-08-31 LAB
ANION GAP SERPL CALCULATED.3IONS-SCNC: 5 MMOL/L (ref 3–14)
BASOPHILS # BLD AUTO: 0 10E3/UL (ref 0–0.2)
BASOPHILS NFR BLD AUTO: 0 %
BUN SERPL-MCNC: 9 MG/DL (ref 7–30)
CALCIUM SERPL-MCNC: 8.8 MG/DL (ref 8.5–10.1)
CHLORIDE BLD-SCNC: 111 MMOL/L (ref 94–109)
CO2 SERPL-SCNC: 24 MMOL/L (ref 20–32)
CREAT SERPL-MCNC: 0.6 MG/DL (ref 0.52–1.04)
EOSINOPHIL # BLD AUTO: 0.2 10E3/UL (ref 0–0.7)
EOSINOPHIL NFR BLD AUTO: 2 %
ERYTHROCYTE [DISTWIDTH] IN BLOOD BY AUTOMATED COUNT: 15.2 % (ref 10–15)
GFR SERPL CREATININE-BSD FRML MDRD: 83 ML/MIN/1.73M2
GLUCOSE BLD-MCNC: 143 MG/DL (ref 70–99)
GLUCOSE BLDC GLUCOMTR-MCNC: 124 MG/DL (ref 70–99)
GLUCOSE BLDC GLUCOMTR-MCNC: 131 MG/DL (ref 70–99)
HCT VFR BLD AUTO: 36.6 % (ref 35–47)
HGB BLD-MCNC: 11.5 G/DL (ref 11.7–15.7)
IMM GRANULOCYTES # BLD: 0.1 10E3/UL
IMM GRANULOCYTES NFR BLD: 1 %
LYMPHOCYTES # BLD AUTO: 2.2 10E3/UL (ref 0.8–5.3)
LYMPHOCYTES NFR BLD AUTO: 22 %
MCH RBC QN AUTO: 28.9 PG (ref 26.5–33)
MCHC RBC AUTO-ENTMCNC: 31.4 G/DL (ref 31.5–36.5)
MCV RBC AUTO: 92 FL (ref 78–100)
MONOCYTES # BLD AUTO: 0.8 10E3/UL (ref 0–1.3)
MONOCYTES NFR BLD AUTO: 8 %
NEUTROPHILS # BLD AUTO: 6.8 10E3/UL (ref 1.6–8.3)
NEUTROPHILS NFR BLD AUTO: 67 %
NRBC # BLD AUTO: 0 10E3/UL
NRBC BLD AUTO-RTO: 0 /100
PLATELET # BLD AUTO: 207 10E3/UL (ref 150–450)
POTASSIUM BLD-SCNC: 3.9 MMOL/L (ref 3.4–5.3)
RBC # BLD AUTO: 3.98 10E6/UL (ref 3.8–5.2)
SODIUM SERPL-SCNC: 140 MMOL/L (ref 133–144)
WBC # BLD AUTO: 10 10E3/UL (ref 4–11)

## 2021-08-31 PROCEDURE — 120N000001 HC R&B MED SURG/OB

## 2021-08-31 PROCEDURE — 999N000156 HC STATISTIC RCP CONSULT EA 30 MIN

## 2021-08-31 PROCEDURE — 99233 SBSQ HOSP IP/OBS HIGH 50: CPT | Performed by: INTERNAL MEDICINE

## 2021-08-31 PROCEDURE — 999N000157 HC STATISTIC RCP TIME EA 10 MIN

## 2021-08-31 PROCEDURE — 36415 COLL VENOUS BLD VENIPUNCTURE: CPT | Performed by: PHYSICIAN ASSISTANT

## 2021-08-31 PROCEDURE — 80048 BASIC METABOLIC PNL TOTAL CA: CPT | Performed by: PHYSICIAN ASSISTANT

## 2021-08-31 PROCEDURE — C9113 INJ PANTOPRAZOLE SODIUM, VIA: HCPCS | Performed by: PHYSICIAN ASSISTANT

## 2021-08-31 PROCEDURE — 250N000011 HC RX IP 250 OP 636: Performed by: PHYSICIAN ASSISTANT

## 2021-08-31 PROCEDURE — G0463 HOSPITAL OUTPT CLINIC VISIT: HCPCS

## 2021-08-31 PROCEDURE — 94640 AIRWAY INHALATION TREATMENT: CPT

## 2021-08-31 PROCEDURE — 85025 COMPLETE CBC W/AUTO DIFF WBC: CPT | Performed by: PHYSICIAN ASSISTANT

## 2021-08-31 PROCEDURE — 258N000003 HC RX IP 258 OP 636: Performed by: PHYSICIAN ASSISTANT

## 2021-08-31 PROCEDURE — 250N000013 HC RX MED GY IP 250 OP 250 PS 637: Performed by: INTERNAL MEDICINE

## 2021-08-31 RX ORDER — ANTIOX #8/OM3/DHA/EPA/LUT/ZEAX 250-2.5 MG
CAPSULE ORAL DAILY
Status: DISCONTINUED | OUTPATIENT
Start: 2021-08-31 | End: 2021-08-31 | Stop reason: CLARIF

## 2021-08-31 RX ORDER — PRAVASTATIN SODIUM 20 MG
80 TABLET ORAL EVERY EVENING
Status: DISCONTINUED | OUTPATIENT
Start: 2021-08-31 | End: 2021-09-03 | Stop reason: HOSPADM

## 2021-08-31 RX ORDER — LEVOTHYROXINE SODIUM 75 UG/1
75 TABLET ORAL EVERY MORNING
Status: DISCONTINUED | OUTPATIENT
Start: 2021-08-31 | End: 2021-09-03 | Stop reason: HOSPADM

## 2021-08-31 RX ORDER — ALBUTEROL SULFATE 90 UG/1
2 AEROSOL, METERED RESPIRATORY (INHALATION) EVERY 6 HOURS
Status: DISCONTINUED | OUTPATIENT
Start: 2021-08-31 | End: 2021-08-31

## 2021-08-31 RX ORDER — POTASSIUM CHLORIDE 1500 MG/1
20 TABLET, EXTENDED RELEASE ORAL 2 TIMES DAILY WITH MEALS
Status: DISCONTINUED | OUTPATIENT
Start: 2021-08-31 | End: 2021-09-03 | Stop reason: HOSPADM

## 2021-08-31 RX ORDER — CETIRIZINE HYDROCHLORIDE 10 MG/1
10 TABLET ORAL DAILY PRN
Status: DISCONTINUED | OUTPATIENT
Start: 2021-08-31 | End: 2021-09-03 | Stop reason: HOSPADM

## 2021-08-31 RX ORDER — ZINC SULFATE 50(220)MG
220 CAPSULE ORAL DAILY
Status: DISCONTINUED | OUTPATIENT
Start: 2021-08-31 | End: 2021-09-03 | Stop reason: HOSPADM

## 2021-08-31 RX ORDER — SERTRALINE HYDROCHLORIDE 100 MG/1
100 TABLET, FILM COATED ORAL DAILY
Status: DISCONTINUED | OUTPATIENT
Start: 2021-08-31 | End: 2021-09-03 | Stop reason: HOSPADM

## 2021-08-31 RX ORDER — AMLODIPINE BESYLATE 5 MG/1
5 TABLET ORAL DAILY
Status: DISCONTINUED | OUTPATIENT
Start: 2021-08-31 | End: 2021-09-02

## 2021-08-31 RX ORDER — ALBUTEROL SULFATE 90 UG/1
2 AEROSOL, METERED RESPIRATORY (INHALATION) EVERY 6 HOURS PRN
Status: DISCONTINUED | OUTPATIENT
Start: 2021-08-31 | End: 2021-09-03 | Stop reason: HOSPADM

## 2021-08-31 RX ADMIN — POTASSIUM CHLORIDE 20 MEQ: 1500 TABLET, EXTENDED RELEASE ORAL at 17:33

## 2021-08-31 RX ADMIN — AMLODIPINE BESYLATE 5 MG: 5 TABLET ORAL at 14:26

## 2021-08-31 RX ADMIN — CIPROFLOXACIN 400 MG: 2 INJECTION, SOLUTION INTRAVENOUS at 21:25

## 2021-08-31 RX ADMIN — METRONIDAZOLE 500 MG: 500 INJECTION, SOLUTION INTRAVENOUS at 11:16

## 2021-08-31 RX ADMIN — B-COMPLEX W/ C & FOLIC ACID TAB 1 TABLET: TAB at 14:25

## 2021-08-31 RX ADMIN — PANTOPRAZOLE SODIUM 40 MG: 40 INJECTION, POWDER, FOR SOLUTION INTRAVENOUS at 08:45

## 2021-08-31 RX ADMIN — LEVOTHYROXINE SODIUM 75 MCG: 0.07 TABLET ORAL at 14:26

## 2021-08-31 RX ADMIN — TRAZODONE HYDROCHLORIDE 150 MG: 100 TABLET ORAL at 21:25

## 2021-08-31 RX ADMIN — ALBUTEROL SULFATE 2 PUFF: 90 AEROSOL, METERED RESPIRATORY (INHALATION) at 16:04

## 2021-08-31 RX ADMIN — SERTRALINE HYDROCHLORIDE 100 MG: 100 TABLET ORAL at 14:26

## 2021-08-31 RX ADMIN — PRAVASTATIN SODIUM 80 MG: 20 TABLET ORAL at 21:24

## 2021-08-31 RX ADMIN — CIPROFLOXACIN 400 MG: 2 INJECTION, SOLUTION INTRAVENOUS at 09:53

## 2021-08-31 RX ADMIN — POTASSIUM CHLORIDE, DEXTROSE MONOHYDRATE AND SODIUM CHLORIDE: 150; 5; 450 INJECTION, SOLUTION INTRAVENOUS at 03:50

## 2021-08-31 RX ADMIN — ZINC SULFATE 220 MG (50 MG) CAPSULE 220 MG: CAPSULE at 14:25

## 2021-08-31 ASSESSMENT — ACTIVITIES OF DAILY LIVING (ADL)
ADLS_ACUITY_SCORE: 16
ADLS_ACUITY_SCORE: 14
ADLS_ACUITY_SCORE: 14
ADLS_ACUITY_SCORE: 16

## 2021-08-31 NOTE — PROGRESS NOTES
[facility]  Hospitalist Progress Note  Thai Mckeon MD, MD 08/31/2021  (Text Page)  Reason for Stay (Diagnosis): Acute diverticulitis with small intramural abscess         Assessment and Plan:      Summary of Stay: Norma F Alpers is a 86 year old female with PMhx significant for GERD, hyperlipidemia, hypertension, thyroid nodule, CVA, type 2 diabetes mellitus, depression, chronic bilateral back pain, macular degeneration, who was admitted on 8/30/2021 with diverticulitis after presenting with abdominal pain, nausea, loose stools.      Problem List:   1. Acute sigmoid diverticulitis with small intramural abscess  2. Monitor for possible developing fistula to the bladder  3. Leukocytosis improve  4. Hypertension  5. History of diabetes mellitus with last A1c of 5.9  6. Hypertension  7. Prior history of CVA  8. History of dyslipidemia    Continue inpatient care.  She remains at risk for clinical deterioration.  Currently we will continue IV antibiotics and receiving Cipro and Flagyl regimen.  Highly appreciate input from colorectal service.  Closely monitor infectious markers.  Optimize pain control.  Diet advanced to liquid diet.  If tolerates then we will can discontinue IV fluid support  Patient requesting to stop insulin sliding scale.    Home regimen of nervous will be continued together with other regimen of levothyroxine and Zoloft with as needed trazodone    DVT Prophylaxis: Pneumatic Compression Devices  Code Status: DNR / DNI  Discharge Dispo: Back to [facility] facility  Estimated Disch Date / # of Days until Disch: Anticipating at least 2 more inpatient days        Interval History (Subjective):      Continuing  care today.  Seen and examined.  Chart reviewed.  Lata is doing better with no complaints of nausea or vomiting.  Abdominal discomfort significantly improved.  Currently tolerating liquid diet.  Afebrile.  Not requiring any oxygen support.  No mental status changes.  Denies  "any chest pain, shortness of breath.  No reported diarrhea.  No bleeding tendencies.                  Physical Exam:      Last Vital Signs:  BP (!) 149/84 (BP Location: Left arm)   Pulse 77   Temp 97.2  F (36.2  C) (Temporal)   Resp 16   Ht 1.651 m (5' 5\")   Wt 79.1 kg (174 lb 6.4 oz)   SpO2 93%   BMI 29.02 kg/m      I/O last 3 completed shifts:  In: 1375 [I.V.:1375]  Out: -   Wt Readings from Last 1 Encounters:   08/30/21 79.1 kg (174 lb 6.4 oz)     Vitals:    08/30/21 1500   Weight: 79.1 kg (174 lb 6.4 oz)       Constitutional: Awake, alert, cooperative, no apparent distress   Respiratory: Clear to auscultation bilaterally, no crackles or wheezing   Cardiovascular: Regular rate and rhythm, normal S1 and S2, and no murmur noted   Abdomen: Normal bowel sounds, soft, non-distended, non-tender   Skin: No rashes, no cyanosis, dry to touch   Neuro: Alert and oriented x3, no weakness, spontaneous and coherent speech   Extremities: No edema, normal range of motion   Other(s): Euthymic mood, not agitated       All other systems: Negative          Medications:      All current medications were reviewed with changes reflected in problem list.         Data:      All new lab and imaging data was reviewed.   Labs:  No results for input(s): CULT in the last 168 hours.  Recent Labs   Lab 08/31/21  0654 08/30/21  1131   WBC 10.0 18.0*   HGB 11.5* 13.0   HCT 36.6 40.8   MCV 92 91    229     Recent Labs   Lab 08/31/21  0654 08/31/21  0551 08/31/21  0238 08/30/21  1131     --   --  139   POTASSIUM 3.9  --   --  3.5   CHLORIDE 111*  --   --  107   CO2 24  --   --  24   ANIONGAP 5  --   --  8   * 124* 131* 125*   BUN 9  --   --  18   CR 0.60  --   --  0.61   GFRESTIMATED 83  --   --  82   HERNAN 8.8  --   --  9.9     No results for input(s): SED, CRP in the last 168 hours.  Recent Labs   Lab 08/31/21  0654 08/31/21  0551 08/31/21  0238 08/30/21  2154 08/30/21  1720   * 124* 131* 137* 129*     No results " for input(s): INR in the last 168 hours.  No results for input(s): TROPONIN, TROPI, TROPR in the last 168 hours.    Invalid input(s): TROP, TROPONINIES  No results for input(s): TSH in the last 168 hours.  Recent Labs   Lab 08/30/21  1308   COLOR Light Yellow   APPEARANCE Clear   URINEGLC Negative   URINEBILI Negative   URINEKETONE Negative   SG 1.010   UBLD Negative   URINEPH 5.5   PROTEIN 10 *   NITRITE Negative   LEUKEST Trace*   RBCU <1   WBCU 4      Imaging:   Results for orders placed or performed during the hospital encounter of 08/30/21   Abd/pelvis CT,  IV  contrast only TRAUMA / AAA    Narrative    CT ABDOMEN AND PELVIS WITH CONTRAST 8/30/2021 11:50 AM    CLINICAL HISTORY: Diverticulitis, complication suspected.    TECHNIQUE: CT scan of the abdomen and pelvis was performed following  injection of IV contrast. Multiplanar reformats were obtained. Dose  reduction techniques were used.    CONTRAST: 88mL Isovue-370    COMPARISON: CT abdomen and pelvis 7/17/2017.    FINDINGS:   LOWER CHEST: Normal.    HEPATOBILIARY: Normal.    PANCREAS: Normal.    SPLEEN: No acute abnormality. Granulomatous calcifications noted.    ADRENAL GLANDS: Normal.    KIDNEYS/BLADDER: No hydronephrosis or obstructing stone. Incidental  bilateral renal cysts not requiring specific imaging follow-up. No gas  within the bladder. See below bowel section regarding inflammation of  the bladder.    BOWEL: Acute diverticulitis at the mid sigmoid colon, series 4 image  174. There is adjacent edema. Hypodensity within the posterior sigmoid  colon wall is ill-defined measuring approximately 2.7 x 1 cm, series 4  image 179. No extraluminal abscess identified. There is adjacent trace  fluid at the pelvis. There is small fluid extending posteriorly and  inferiorly from this position to contact the anterior upper bladder  wall, series 4 image 188. There is bladder wall thickening in this  region, image 190. Remainder of the bowel shows no obstruction  or  acute inflammation. Appendix not visualized. No signs of appendicitis.    PELVIC ORGANS: Unremarkable.    ADDITIONAL FINDINGS: Scattered vascular calcifications. No suspicious  lymph node.    MUSCULOSKELETAL: Mild spine degenerative change.      Impression    IMPRESSION:   1.  Acute diverticulitis of the mid sigmoid colon. Small hypodensity  within the posterior sigmoid colon wall could be an intramural small  abscess. No extraluminal abscess can be seen. There is also fluid  extending from the diverticulitis causing a degree of tethering and  wall thickening of the adjacent anterior bladder. Developing fistula  is a possibility.  2.  No other acute abnormality.    RENY SAGASTUME MD         SYSTEM ID:  [id number]

## 2021-08-31 NOTE — PLAN OF CARE
Pt is voiding . Pt is tolerating small amounts of sips of clears.  Pt has denied pain..  pt has not had a BM this shift. Pt is passing gas.

## 2021-08-31 NOTE — PROGRESS NOTES
"[facility]     Date:8/31/21  Admission Dx: diverticulitis  Pulmonary History:Na  Home Nebulizer/MDI Use: Albuterol as needed  Home Oxygen:NA  Acuity Level (RCAT flow sheet):Level 4  Aerosol Therapy initiated: Albuterol 2p Q6 prn      Pulmonary Hygiene initiated:Cough and deep breathing BID      Volume Expansion initiated:NA      Current Oxygen Requirements: Room air  Current SpO2: 95%    Re-evaluation date: 9/4/21    Patient Education: Pt educated on how to perform correctly and when to take it.      See \"RT Assessments\" flow sheet for patient assessment scoring and Acuity Level Details.             "

## 2021-08-31 NOTE — PLAN OF CARE
palma PO clear liquids slowly and well, up with SBA and walker, voiding in good amts, on IV Flagyl and IV Cipro

## 2021-08-31 NOTE — PROGRESS NOTES
COLON & RECTAL SURGERY  PROGRESS NOTE    August 31, 2021    SUBJECTIVE:  Patient reports she felt nauseous this morning, but is feeling better now after she brushed her teeth.  She passed flatus this morning.  Her abdominal pain is intermittent.      OBJECTIVE:  Temp:  [96.8  F (36  C)-98.7  F (37.1  C)] 96.8  F (36  C)  Pulse:  [72-89] 86  Resp:  [18] 18  BP: (114-163)/(66-86) 153/82  SpO2:  [93 %-96 %] 93 %    Intake/Output Summary (Last 24 hours) at 8/31/2021 0910  Last data filed at 8/31/2021 0649  Gross per 24 hour   Intake 1375 ml   Output --   Net 1375 ml       GENERAL:  Awake, alert, no acute distress, lying in bed.  HEAD: Nomocephalic atraumatic  SCLERA: anicteric  EXTREMITIES: warm and well perfused  ABDOMEN:  Softly distended, tender over lower abdomen, no rebound or guarding, no peritoneal signs.    LABS:  Lab Results   Component Value Date    WBC 10.0 08/31/2021    WBC 9.0 03/04/2019     Lab Results   Component Value Date    HGB 11.5 08/31/2021    HGB 14.8 07/07/2020     Lab Results   Component Value Date    HCT 36.6 08/31/2021    HCT 44.1 03/04/2019     Lab Results   Component Value Date     08/31/2021     03/04/2019     Last Basic Metabolic Panel:  Lab Results   Component Value Date     08/31/2021     01/11/2021      Lab Results   Component Value Date    POTASSIUM 3.9 08/31/2021    POTASSIUM 4.1 01/11/2021     Lab Results   Component Value Date    CHLORIDE 111 08/31/2021    CHLORIDE 110 01/11/2021     Lab Results   Component Value Date    HERNAN 8.8 08/31/2021    HERNAN 9.9 01/11/2021     Lab Results   Component Value Date    CO2 24 08/31/2021    CO2 25 01/11/2021     Lab Results   Component Value Date    BUN 9 08/31/2021    BUN 20 01/11/2021     Lab Results   Component Value Date    CR 0.60 08/31/2021    CR 0.67 01/11/2021     Lab Results   Component Value Date     08/31/2021     01/11/2021       ASSESSMENT/PLAN: 86 year old woman admitted with diverticulitis with  small intramural abscess and possible developing fistula to the bladder.  Afebrile.  WBC improved to 10.     - Clear liquids  - Continue IV antibiotics  - Pain management as needed  - Encourage ambulation  - Monitor I&Os  - No plans for surgery at this time      For questions/paging, please contact the CRS office at [phone].    Paula Alaniz PA-C  [facility]  Phone: [phone]    Colon and Rectal Surgery Attending Note    Patient seen and examined independently.  Agree with above assessment and plan.  She is feeling better today.  Positive flatus.  No BM since yesterday morning.  Abdomen soft, mildly tender in the suprapubic region.  Plan.  Her white count is improving will plan for clear liquid diet, IV antibiotics, continued IV fluids at this time.  She is hopeful to avoid surgery which seems reasonable given her improvement.    Deana Villegas MD  [facility]  [address], [address]  [place] [address]  T: [phone]  F: [phone]   [email or web address]

## 2021-08-31 NOTE — PLAN OF CARE
RN [id number]  A&Ox4, VSS, SBA GB/W. Blanchable redness to coccyx - mepilex in place. C/o lower abd pain, most quadrants tender to palpation. Flatus+, denies N/V. IV abx cipro and flagyl. Plan to for GI consult today.

## 2021-09-01 ENCOUNTER — TELEPHONE (OUTPATIENT)
Dept: INTERNAL MEDICINE | Facility: CLINIC | Age: 86
End: 2021-09-01

## 2021-09-01 LAB
ANION GAP SERPL CALCULATED.3IONS-SCNC: 1 MMOL/L (ref 3–14)
BASOPHILS # BLD AUTO: 0 10E3/UL (ref 0–0.2)
BASOPHILS NFR BLD AUTO: 1 %
BUN SERPL-MCNC: 7 MG/DL (ref 7–30)
CALCIUM SERPL-MCNC: 9.9 MG/DL (ref 8.5–10.1)
CHLORIDE BLD-SCNC: 110 MMOL/L (ref 94–109)
CO2 SERPL-SCNC: 28 MMOL/L (ref 20–32)
CREAT SERPL-MCNC: 0.64 MG/DL (ref 0.52–1.04)
EOSINOPHIL # BLD AUTO: 0.3 10E3/UL (ref 0–0.7)
EOSINOPHIL NFR BLD AUTO: 3 %
ERYTHROCYTE [DISTWIDTH] IN BLOOD BY AUTOMATED COUNT: 14.8 % (ref 10–15)
GFR SERPL CREATININE-BSD FRML MDRD: 81 ML/MIN/1.73M2
GLUCOSE BLD-MCNC: 125 MG/DL (ref 70–99)
HCT VFR BLD AUTO: 42.3 % (ref 35–47)
HGB BLD-MCNC: 13.1 G/DL (ref 11.7–15.7)
IMM GRANULOCYTES # BLD: 0 10E3/UL
IMM GRANULOCYTES NFR BLD: 0 %
LYMPHOCYTES # BLD AUTO: 1.9 10E3/UL (ref 0.8–5.3)
LYMPHOCYTES NFR BLD AUTO: 23 %
MCH RBC QN AUTO: 28.5 PG (ref 26.5–33)
MCHC RBC AUTO-ENTMCNC: 31 G/DL (ref 31.5–36.5)
MCV RBC AUTO: 92 FL (ref 78–100)
MONOCYTES # BLD AUTO: 0.7 10E3/UL (ref 0–1.3)
MONOCYTES NFR BLD AUTO: 8 %
NEUTROPHILS # BLD AUTO: 5.2 10E3/UL (ref 1.6–8.3)
NEUTROPHILS NFR BLD AUTO: 65 %
NRBC # BLD AUTO: 0 10E3/UL
NRBC BLD AUTO-RTO: 0 /100
PLATELET # BLD AUTO: 212 10E3/UL (ref 150–450)
POTASSIUM BLD-SCNC: 4 MMOL/L (ref 3.4–5.3)
RBC # BLD AUTO: 4.59 10E6/UL (ref 3.8–5.2)
SODIUM SERPL-SCNC: 139 MMOL/L (ref 133–144)
WBC # BLD AUTO: 8.1 10E3/UL (ref 4–11)

## 2021-09-01 PROCEDURE — 250N000013 HC RX MED GY IP 250 OP 250 PS 637: Performed by: PHYSICIAN ASSISTANT

## 2021-09-01 PROCEDURE — 99232 SBSQ HOSP IP/OBS MODERATE 35: CPT | Performed by: INTERNAL MEDICINE

## 2021-09-01 PROCEDURE — 85025 COMPLETE CBC W/AUTO DIFF WBC: CPT | Performed by: INTERNAL MEDICINE

## 2021-09-01 PROCEDURE — 250N000013 HC RX MED GY IP 250 OP 250 PS 637: Performed by: INTERNAL MEDICINE

## 2021-09-01 PROCEDURE — 80048 BASIC METABOLIC PNL TOTAL CA: CPT | Performed by: INTERNAL MEDICINE

## 2021-09-01 PROCEDURE — 36415 COLL VENOUS BLD VENIPUNCTURE: CPT | Performed by: INTERNAL MEDICINE

## 2021-09-01 PROCEDURE — 120N000001 HC R&B MED SURG/OB

## 2021-09-01 PROCEDURE — 250N000011 HC RX IP 250 OP 636: Performed by: PHYSICIAN ASSISTANT

## 2021-09-01 RX ORDER — PANTOPRAZOLE SODIUM 40 MG/1
40 TABLET, DELAYED RELEASE ORAL
Status: DISCONTINUED | OUTPATIENT
Start: 2021-09-01 | End: 2021-09-03 | Stop reason: HOSPADM

## 2021-09-01 RX ADMIN — ACETAMINOPHEN 650 MG: 325 TABLET, FILM COATED ORAL at 07:06

## 2021-09-01 RX ADMIN — POTASSIUM CHLORIDE 20 MEQ: 1500 TABLET, EXTENDED RELEASE ORAL at 08:05

## 2021-09-01 RX ADMIN — ZINC SULFATE 220 MG (50 MG) CAPSULE 220 MG: CAPSULE at 08:06

## 2021-09-01 RX ADMIN — TRAZODONE HYDROCHLORIDE 150 MG: 100 TABLET ORAL at 22:18

## 2021-09-01 RX ADMIN — PRAVASTATIN SODIUM 80 MG: 20 TABLET ORAL at 20:25

## 2021-09-01 RX ADMIN — LEVOTHYROXINE SODIUM 75 MCG: 0.07 TABLET ORAL at 08:05

## 2021-09-01 RX ADMIN — SERTRALINE HYDROCHLORIDE 100 MG: 100 TABLET ORAL at 08:06

## 2021-09-01 RX ADMIN — B-COMPLEX W/ C & FOLIC ACID TAB 1 TABLET: TAB at 08:04

## 2021-09-01 RX ADMIN — PANTOPRAZOLE SODIUM 40 MG: 40 TABLET, DELAYED RELEASE ORAL at 08:24

## 2021-09-01 RX ADMIN — METRONIDAZOLE 500 MG: 500 INJECTION, SOLUTION INTRAVENOUS at 01:07

## 2021-09-01 RX ADMIN — METRONIDAZOLE 500 MG: 500 INJECTION, SOLUTION INTRAVENOUS at 11:22

## 2021-09-01 RX ADMIN — POTASSIUM CHLORIDE 20 MEQ: 1500 TABLET, EXTENDED RELEASE ORAL at 17:20

## 2021-09-01 RX ADMIN — AMLODIPINE BESYLATE 5 MG: 5 TABLET ORAL at 08:07

## 2021-09-01 RX ADMIN — CIPROFLOXACIN 400 MG: 2 INJECTION, SOLUTION INTRAVENOUS at 22:18

## 2021-09-01 RX ADMIN — CIPROFLOXACIN 400 MG: 2 INJECTION, SOLUTION INTRAVENOUS at 10:07

## 2021-09-01 ASSESSMENT — ACTIVITIES OF DAILY LIVING (ADL)
ADLS_ACUITY_SCORE: 14
ADLS_ACUITY_SCORE: 14
DEPENDENT_IADLS:: INDEPENDENT;CLEANING
ADLS_ACUITY_SCORE: 14
ADLS_ACUITY_SCORE: 16

## 2021-09-01 NOTE — PROGRESS NOTES
COLON & RECTAL SURGERY  PROGRESS NOTE    September 1, 2021    SUBJECTIVE:  Patient reports she continues to feel better.  Minimal lower abdominal pain intermittently.  No nausea.  Tolerating clear liquid diet.  +flatus.  No BM since Monday (2 days ago).    OBJECTIVE:  Temp:  [96.9  F (36.1  C)-98.3  F (36.8  C)] 97.6  F (36.4  C)  Pulse:  [70-82] 70  Resp:  [16-18] 16  BP: (149-179)/(77-95) 157/95  SpO2:  [93 %-97 %] 96 %    Intake/Output Summary (Last 24 hours) at 9/1/2021 0832  Last data filed at 9/1/2021 0703  Gross per 24 hour   Intake 240 ml   Output --   Net 240 ml       GENERAL:  Awake, alert, no acute distress, lying in bed.  HEAD: Nomocephalic atraumatic  SCLERA: anicteric  EXTREMITIES: warm and well perfused  ABDOMEN:  Softly distended, mildly tender over lower abdomen, no rebound or guarding, no peritoneal signs.    LABS:  Lab Results   Component Value Date    WBC 8.1 09/01/2021    WBC 9.0 03/04/2019     Lab Results   Component Value Date    HGB 13.1 09/01/2021    HGB 14.8 07/07/2020     Lab Results   Component Value Date    HCT 42.3 09/01/2021    HCT 44.1 03/04/2019     Lab Results   Component Value Date     09/01/2021     03/04/2019     Last Basic Metabolic Panel:  Lab Results   Component Value Date     09/01/2021     01/11/2021      Lab Results   Component Value Date    POTASSIUM 4.0 09/01/2021    POTASSIUM 4.1 01/11/2021     Lab Results   Component Value Date    CHLORIDE 110 09/01/2021    CHLORIDE 110 01/11/2021     Lab Results   Component Value Date    HERNAN 9.9 09/01/2021    HERNAN 9.9 01/11/2021     Lab Results   Component Value Date    CO2 28 09/01/2021    CO2 25 01/11/2021     Lab Results   Component Value Date    BUN 7 09/01/2021    BUN 20 01/11/2021     Lab Results   Component Value Date    CR 0.64 09/01/2021    CR 0.67 01/11/2021     Lab Results   Component Value Date     09/01/2021     01/11/2021       ASSESSMENT/PLAN:  86 year old woman admitted with  diverticulitis with small intramural abscess and possible developing fistula to the bladder.  Afebrile.  WBC 8.1.      - Full liquid diet  - Continue IV antibiotics, transition to PO once tolerating diet  - Pain management as needed  - Encourage ambulation  - Monitor I&Os  - No plans for surgery at this time        For questions/paging, please contact the CRS office at [phone].    Paula Alaniz PA-C  [facility]  Phone: [phone]    Colon and Rectal Surgery Attending Note    Patient seen and examined independently.  Agree with above assessment and plan.  Feeling better. Denies pain.   abd soft, mild BL lower quadrant tenderness  Plan  Abx, IVF, full liquid.    Deana Villegas MD  [facility]  [address], [address]  [place] [address]  T: [phone]  F: [phone]   [email or web address]

## 2021-09-01 NOTE — CONSULTS
Care Management Initial Consult    General Information  Assessment completed with: Lata Vazquez  Type of CM/SW Visit: Initial Assessment    Primary Care Provider verified and updated as needed: Yes   Readmission within the last 30 days: no previous admission in last 30 days   Return Category: New Diagnosis  Reason for Consult: care coordination/care conference, discharge planning  Advance Care Planning: Advance Care Planning Reviewed: no concerns identified        Communication Assessment  Patient's communication style: spoken language (English or Bilingual)    Hearing Difficulty or Deaf: no   Wear Glasses or Blind: yes    Cognitive  Cognitive/Neuro/Behavioral: WDL                      Living Environment:   People in home: facility resident  Lata  Current living Arrangements: independent living facility      Able to return to prior arrangements: yes     Family/Social Support:  Care provided by: self, child(andre)  Provides care for:    Marital Status:   Children, Neighbor          Description of Support System: Supportive, Involved    Support Assessment: Adequate family and caregiver support    Current Resources:   Patient receiving home care services: No     Community Resources: None  Equipment currently used at home: walker, rolling  Supplies currently used at home: Diabetic Supplies    Employment/Financial:  Employment Status: retired        Financial Concerns: No concerns identified     Lifestyle & Psychosocial Needs:  Social Determinants of Health     Tobacco Use: Medium Risk     Smoking Tobacco Use: Former Smoker     Smokeless Tobacco Use: Never Used   Alcohol Use:      Frequency of Alcohol Consumption:      Average Number of Drinks:      Frequency of Binge Drinking:    Financial Resource Strain:      Difficulty of Paying Living Expenses:    Food Insecurity:      Worried About Running Out of Food in the Last Year:      Ran Out of Food in the Last Year:    Transportation Needs:      Lack of  Transportation (Medical):      Lack of Transportation (Non-Medical):    Physical Activity:      Days of Exercise per Week:      Minutes of Exercise per Session:    Stress:      Feeling of Stress :    Social Connections:      Frequency of Communication with Friends and Family:      Frequency of Social Gatherings with Friends and Family:      Attends Voodoo Services:      Active Member of Clubs or Organizations:      Attends Club or Organization Meetings:      Marital Status:    Intimate Partner Violence:      Fear of Current or Ex-Partner:      Emotionally Abused:      Physically Abused:      Sexually Abused:    Depression: Not at risk     PHQ-2 Score: 1   Housing Stability:      Unable to Pay for Housing in the Last Year:      Number of Places Lived in the Last Year:      Unstable Housing in the Last Year:      Functional Status:  Prior to admission patient needed assistance:   Dependent ADLs:: Independent, Ambulation-walker  Dependent IADLs:: Independent, Cleaning     Mental Health Status:  Mental Health Status: No Current Concerns       Chemical Dependency Status:  Chemical Dependency Status: No Current Concerns        Values/Beliefs:  Spiritual, Cultural Beliefs, Voodoo Practices, Values that affect care: no    Additional Information:  Patient admitted for diverticulitis. Her URR is 13%. CM met with patient at bedside. Introduced self & role. Verified she lives at [facility] in independent living. She has good support through her children and neighbors at [facility]. She uses a 4WW since she's had falls, thankfully without injuries. She has her prescriptions filled at [facility] [address] and has no issues obtaining her medications. She has a person come in every other Wednesday to clean her apartment. No other services are used. Her daughter, Bessy, lives in [facility] about 2 hours away. She will be coming to [facility] on Friday to transport patient home.   Patient is agreeable to having CM schedule her follow  up appointment with Dr. Adelita Kennedy. Entered [facility] referral to schedule f/u appt. She does not require a OP CC referral.     CM will continue to follow patient until discharge for any additional needs.     Chely Weir RN, BSN, CPHN, CM  Inpatient Care Coordination - [facility]  [phone]

## 2021-09-01 NOTE — PLAN OF CARE
A&O.  Elevated BP, otherwise vitally stable.  Denies pain.  CMS intact.  SBA with walker.  Voiding in good amounts.  Tolerating clear diet.  Will continue to monitor.

## 2021-09-01 NOTE — PLAN OF CARE
Patient vital signs are at baseline: Yes  Patient able to ambulate as they were prior to admission or with assist devices provided by therapies during their stay:  Yes  Patient MUST void prior to discharge:  Yes  Patient able to tolerate oral intake:  Yes  Pain has adequate pain control using Oral analgesics:  Yes    Pt. Oriented x4. Vitals WDL. Saline locked. Pt. took sacrum meplex off while using bathroom. New meplex applied. Tolerating full liquid diet.    Alejandra Patel RN

## 2021-09-01 NOTE — TELEPHONE ENCOUNTER
Reason for call:  Other   Patient called regarding (reason for call): appointment  Additional comments: f/u to hospital admit 8/30/21 discharged 9/2/21 to be seen in 7-8 days - seen for sigmoid diverticulitis     Phone number to reach patient:  Home number on file [phone] (home)    Best Time:  anytiem    Can we leave a detailed message on this number?  YES    Travel screening: Not Applicable

## 2021-09-01 NOTE — PROGRESS NOTES
[facility]  Hospitalist Progress Note  Thai Mckeon MD, MD 09/01/2021  (Text Page)  Reason for Stay (Diagnosis): Acute diverticulitis with small intramural abscess         Assessment and Plan:      Summary of Stay: Norma F Alpers is a 86 year old female with PMhx significant for GERD, hyperlipidemia, hypertension, thyroid nodule, CVA, type 2 diabetes mellitus, depression, chronic bilateral back pain, macular degeneration, who was admitted on 8/30/2021 with diverticulitis after presenting with abdominal pain, nausea, loose stools.      Problem List:   1. Acute sigmoid diverticulitis with small intramural abscess  2. Monitor for possible developing fistula to the bladder  3. Leukocytosis improve  4. Hypertension  5. History of diabetes mellitus with last A1c of 5.9  6. Hypertension  7. Prior history of CVA  8. History of dyslipidemia    Continue inpatient care.  She remains at risk for clinical deterioration.  Currently we will continue IV antibiotics and receiving Cipro and Flagyl regimen.  Highly appreciate input from colorectal service.  -Now on full liquids  -Continue IV antibiotics  -Patient requesting to stop insulin sliding scale.    Home regimen of nervous will be continued together with other regimen of levothyroxine and Zoloft with as needed trazodone    DVT Prophylaxis: Pneumatic Compression Devices  Code Status: DNR / DNI  Discharge Dispo: Back to [facility] facility  Estimated Disch Date / # of Days until Disch: Anticipating at least 1-2 more inpatient days  Attempted to talk to patient's daughter and left her a detailed voicemail      Interval History (Subjective):      Continuing  care today.  Seen and examined.  Chart reviewed.  As always Lata is very pleasant to talk to.  She is in good spirits.  She thinks she is continuously improving.  Denies any nausea, vomiting, abdominal pain.  Passing flatus and had a small bowel movement earlier.  Voiding freely.  Denies any shortness  "of breath, nausea, chest pain, no mental status changes.  Remained afebrile.  Continues to demonstrate stable hemodynamics.  Not requiring any oxygen support.               Physical Exam:      Last Vital Signs:  BP (!) 157/95 (BP Location: Left arm)   Pulse 70   Temp 97.6  F (36.4  C) (Temporal)   Resp 16   Ht 1.651 m (5' 5\")   Wt 79.1 kg (174 lb 6.4 oz)   SpO2 96%   BMI 29.02 kg/m      No intake/output data recorded.  Wt Readings from Last 1 Encounters:   08/30/21 79.1 kg (174 lb 6.4 oz)     Vitals:    08/30/21 1500   Weight: 79.1 kg (174 lb 6.4 oz)       Constitutional: Awake, alert, cooperative, no apparent distress   Respiratory: Clear to auscultation bilaterally, no crackles or wheezing   Cardiovascular: Regular rate and rhythm, normal S1 and S2, and no murmur noted   Abdomen: Normal bowel sounds, soft, non-distended, non-tender   Skin: No rashes, no cyanosis, dry to touch   Neuro: Alert and oriented x3, no weakness, spontaneous and coherent speech   Extremities: No edema, normal range of motion   Other(s): Euthymic mood, not agitated       All other systems: Negative          Medications:      All current medications were reviewed with changes reflected in problem list.         Data:      All new lab and imaging data was reviewed.   Labs:  No results for input(s): CULT in the last 168 hours.  Recent Labs   Lab 09/01/21  0655 08/31/21  0654 08/30/21  1131   WBC 8.1 10.0 18.0*   HGB 13.1 11.5* 13.0   HCT 42.3 36.6 40.8   MCV 92 92 91    207 229     Recent Labs   Lab 09/01/21  0655 08/31/21  0654 08/31/21  0551 08/30/21  1131    140  --  139   POTASSIUM 4.0 3.9  --  3.5   CHLORIDE 110* 111*  --  107   CO2 28 24  --  24   ANIONGAP 1* 5  --  8   * 143* 124* 125*   BUN 7 9  --  18   CR 0.64 0.60  --  0.61   GFRESTIMATED 81 83  --  82   HERNAN 9.9 8.8  --  9.9     No results for input(s): SED, CRP in the last 168 hours.  Recent Labs   Lab 09/01/21  0655 08/31/21  0654 08/31/21  0551 " 08/31/21  0238 08/30/21  2154   * 143* 124* 131* 137*     No results for input(s): INR in the last 168 hours.  No results for input(s): TROPONIN, TROPI, TROPR in the last 168 hours.    Invalid input(s): TROP, TROPONINIES  No results for input(s): TSH in the last 168 hours.  Recent Labs   Lab 08/30/21  1308   COLOR Light Yellow   APPEARANCE Clear   URINEGLC Negative   URINEBILI Negative   URINEKETONE Negative   SG 1.010   UBLD Negative   URINEPH 5.5   PROTEIN 10 *   NITRITE Negative   LEUKEST Trace*   RBCU <1   WBCU 4      Imaging:   Results for orders placed or performed during the hospital encounter of 08/30/21   Abd/pelvis CT,  IV  contrast only TRAUMA / AAA    Narrative    CT ABDOMEN AND PELVIS WITH CONTRAST 8/30/2021 11:50 AM    CLINICAL HISTORY: Diverticulitis, complication suspected.    TECHNIQUE: CT scan of the abdomen and pelvis was performed following  injection of IV contrast. Multiplanar reformats were obtained. Dose  reduction techniques were used.    CONTRAST: 88mL Isovue-370    COMPARISON: CT abdomen and pelvis 7/17/2017.    FINDINGS:   LOWER CHEST: Normal.    HEPATOBILIARY: Normal.    PANCREAS: Normal.    SPLEEN: No acute abnormality. Granulomatous calcifications noted.    ADRENAL GLANDS: Normal.    KIDNEYS/BLADDER: No hydronephrosis or obstructing stone. Incidental  bilateral renal cysts not requiring specific imaging follow-up. No gas  within the bladder. See below bowel section regarding inflammation of  the bladder.    BOWEL: Acute diverticulitis at the mid sigmoid colon, series 4 image  174. There is adjacent edema. Hypodensity within the posterior sigmoid  colon wall is ill-defined measuring approximately 2.7 x 1 cm, series 4  image 179. No extraluminal abscess identified. There is adjacent trace  fluid at the pelvis. There is small fluid extending posteriorly and  inferiorly from this position to contact the anterior upper bladder  wall, series 4 image 188. There is bladder wall  thickening in this  region, image 190. Remainder of the bowel shows no obstruction or  acute inflammation. Appendix not visualized. No signs of appendicitis.    PELVIC ORGANS: Unremarkable.    ADDITIONAL FINDINGS: Scattered vascular calcifications. No suspicious  lymph node.    MUSCULOSKELETAL: Mild spine degenerative change.      Impression    IMPRESSION:   1.  Acute diverticulitis of the mid sigmoid colon. Small hypodensity  within the posterior sigmoid colon wall could be an intramural small  abscess. No extraluminal abscess can be seen. There is also fluid  extending from the diverticulitis causing a degree of tethering and  wall thickening of the adjacent anterior bladder. Developing fistula  is a possibility.  2.  No other acute abnormality.    RENY SAGASTUME MD         SYSTEM ID:  [id number]

## 2021-09-02 PROCEDURE — 120N000001 HC R&B MED SURG/OB

## 2021-09-02 PROCEDURE — 250N000013 HC RX MED GY IP 250 OP 250 PS 637: Performed by: INTERNAL MEDICINE

## 2021-09-02 PROCEDURE — 99232 SBSQ HOSP IP/OBS MODERATE 35: CPT | Performed by: INTERNAL MEDICINE

## 2021-09-02 PROCEDURE — 250N000011 HC RX IP 250 OP 636: Performed by: PHYSICIAN ASSISTANT

## 2021-09-02 PROCEDURE — 250N000013 HC RX MED GY IP 250 OP 250 PS 637: Performed by: PHYSICIAN ASSISTANT

## 2021-09-02 RX ORDER — FLUCONAZOLE 100 MG/1
100 TABLET ORAL ONCE
Status: COMPLETED | OUTPATIENT
Start: 2021-09-02 | End: 2021-09-02

## 2021-09-02 RX ORDER — METRONIDAZOLE 500 MG/1
500 TABLET ORAL 3 TIMES DAILY
Status: DISCONTINUED | OUTPATIENT
Start: 2021-09-02 | End: 2021-09-03 | Stop reason: HOSPADM

## 2021-09-02 RX ORDER — AMLODIPINE BESYLATE 5 MG/1
5 TABLET ORAL ONCE
Status: COMPLETED | OUTPATIENT
Start: 2021-09-02 | End: 2021-09-02

## 2021-09-02 RX ORDER — CIPROFLOXACIN 250 MG/1
500 TABLET, FILM COATED ORAL EVERY 12 HOURS SCHEDULED
Status: DISCONTINUED | OUTPATIENT
Start: 2021-09-02 | End: 2021-09-03 | Stop reason: HOSPADM

## 2021-09-02 RX ORDER — AMLODIPINE BESYLATE 10 MG/1
10 TABLET ORAL DAILY
Status: DISCONTINUED | OUTPATIENT
Start: 2021-09-03 | End: 2021-09-03 | Stop reason: HOSPADM

## 2021-09-02 RX ADMIN — ZINC SULFATE 220 MG (50 MG) CAPSULE 220 MG: CAPSULE at 08:00

## 2021-09-02 RX ADMIN — METRONIDAZOLE 500 MG: 500 TABLET ORAL at 19:59

## 2021-09-02 RX ADMIN — METRONIDAZOLE 500 MG: 500 INJECTION, SOLUTION INTRAVENOUS at 01:13

## 2021-09-02 RX ADMIN — B-COMPLEX W/ C & FOLIC ACID TAB 1 TABLET: TAB at 08:00

## 2021-09-02 RX ADMIN — CIPROFLOXACIN 500 MG: 250 TABLET ORAL at 11:13

## 2021-09-02 RX ADMIN — AMLODIPINE BESYLATE 5 MG: 5 TABLET ORAL at 07:59

## 2021-09-02 RX ADMIN — POTASSIUM CHLORIDE 20 MEQ: 1500 TABLET, EXTENDED RELEASE ORAL at 07:59

## 2021-09-02 RX ADMIN — AMLODIPINE BESYLATE 5 MG: 5 TABLET ORAL at 12:05

## 2021-09-02 RX ADMIN — TRAZODONE HYDROCHLORIDE 150 MG: 100 TABLET ORAL at 22:02

## 2021-09-02 RX ADMIN — LEVOTHYROXINE SODIUM 75 MCG: 0.07 TABLET ORAL at 07:59

## 2021-09-02 RX ADMIN — METRONIDAZOLE 500 MG: 500 TABLET ORAL at 13:48

## 2021-09-02 RX ADMIN — CIPROFLOXACIN 500 MG: 250 TABLET ORAL at 19:59

## 2021-09-02 RX ADMIN — PANTOPRAZOLE SODIUM 40 MG: 40 TABLET, DELAYED RELEASE ORAL at 07:59

## 2021-09-02 RX ADMIN — PRAVASTATIN SODIUM 80 MG: 20 TABLET ORAL at 19:59

## 2021-09-02 RX ADMIN — POTASSIUM CHLORIDE 20 MEQ: 1500 TABLET, EXTENDED RELEASE ORAL at 17:29

## 2021-09-02 RX ADMIN — FLUCONAZOLE 100 MG: 100 TABLET ORAL at 13:48

## 2021-09-02 RX ADMIN — SERTRALINE HYDROCHLORIDE 100 MG: 100 TABLET ORAL at 07:59

## 2021-09-02 ASSESSMENT — ACTIVITIES OF DAILY LIVING (ADL)
ADLS_ACUITY_SCORE: 14

## 2021-09-02 NOTE — PROGRESS NOTES
COLON & RECTAL SURGERY  PROGRESS NOTE    September 2, 2021    SUBJECTIVE:  Patient denies abdominal pain - just discomfort over the lower abdomen.  No nausea with full liquids.  +flatus.  +BM.    OBJECTIVE:  Temp:  [96.8  F (36  C)-98.3  F (36.8  C)] 96.8  F (36  C)  Pulse:  [74-83] 81  Resp:  [18] 18  BP: (133-170)/(73-92) 170/92  SpO2:  [93 %-94 %] 94 %  No intake or output data in the 24 hours ending 09/02/21 0908    GENERAL:  Awake, alert, no acute distress, lying in bed.  HEAD: Nomocephalic atraumatic  SCLERA: anicteric  EXTREMITIES: warm and well perfused  ABDOMEN:  Softly, mildly tender over lower abdomen, no rebound or guarding, no peritoneal signs.    LABS:  Lab Results   Component Value Date    WBC 8.1 09/01/2021    WBC 9.0 03/04/2019     Lab Results   Component Value Date    HGB 13.1 09/01/2021    HGB 14.8 07/07/2020     Lab Results   Component Value Date    HCT 42.3 09/01/2021    HCT 44.1 03/04/2019     Lab Results   Component Value Date     09/01/2021     03/04/2019     Last Basic Metabolic Panel:  Lab Results   Component Value Date     09/01/2021     01/11/2021      Lab Results   Component Value Date    POTASSIUM 4.0 09/01/2021    POTASSIUM 4.1 01/11/2021     Lab Results   Component Value Date    CHLORIDE 110 09/01/2021    CHLORIDE 110 01/11/2021     Lab Results   Component Value Date    HERNAN 9.9 09/01/2021    HERNAN 9.9 01/11/2021     Lab Results   Component Value Date    CO2 28 09/01/2021    CO2 25 01/11/2021     Lab Results   Component Value Date    BUN 7 09/01/2021    BUN 20 01/11/2021     Lab Results   Component Value Date    CR 0.64 09/01/2021    CR 0.67 01/11/2021     Lab Results   Component Value Date     09/01/2021     01/11/2021       ASSESSMENT/PLAN: 86 year old woman admitted with diverticulitis with small intramural abscess.  Afebrile.      - Low fiber diet  - Transition to PO antibiotics  - Pain management as needed  - Encourage ambulation  - Monitor  I&Os  - No plans for surgery at this time  - Ok to discharge later today if continues to do well      For questions/paging, please contact the CRS office at [phone].    Paula Alaniz PA-C  [facility]  Phone: [phone]    Colon and Rectal Surgery Attending Note    Patient seen and examined independently.  Agree with above assessment and plan.  Feeling better. + BM. No pain or bleeding.   abd soft with minimal focal tenderness. Overall better.   Plan as above.   Low fiber for 10 days.  Oral abx for a total of 14 days of ABX.    Deana Villegas MD  [facility]  [address], [address]  [place] [address]  T: [phone]  F: [phone]   [email or web address]

## 2021-09-02 NOTE — PLAN OF CARE
Patient vital signs are at baseline: Yes  Patient able to ambulate as they were prior to admission or with assist devices provided by therapies during their stay:  Yes  Patient MUST void prior to discharge:  Yes  Patient able to tolerate oral intake:  Yes tolerating low fiber diet well  Pain has adequate pain control using Oral analgesics:  Yes     Changed to PO Cipro and PO Flagyl, had a dose of PO Diflucan, hoping to discharge back to home tomorrow

## 2021-09-02 NOTE — TELEPHONE ENCOUNTER
There are no openings until the week of 9/13. She can look at other clinics or keep this.     Please call.

## 2021-09-02 NOTE — PLAN OF CARE
Patient vital signs are at baseline: Yes  Patient able to ambulate as they were prior to admission or with assist devices provided by therapies during their stay:  Yes  Patient MUST void prior to discharge:  Yes  Patient able to tolerate oral intake:  Yes  Pain has adequate pain control using Oral analgesics:  Yes    Tolerating full liquid diet without difficulty  Up indep to bathroom with walker

## 2021-09-02 NOTE — PLAN OF CARE
Pt A/Ox4. Up Ind in room to bathroom with walker. Denies pain/N/V. New IV placement due to infiltration. VSS. Hopeful for d\c 9/3 back to home at senior living.

## 2021-09-02 NOTE — PROGRESS NOTES
[facility]  Hospitalist Progress Note  Thai Mckeon MD, MD 09/02/2021  (Text Page)  Reason for Stay (Diagnosis): Acute diverticulitis with small intramural abscess         Assessment and Plan:      Summary of Stay: Norma F Alpers is a 86 year old female with PMhx significant for GERD, hyperlipidemia, hypertension, thyroid nodule, CVA, type 2 diabetes mellitus, depression, chronic bilateral back pain, macular degeneration, who was admitted on 8/30/2021 with diverticulitis after presenting with abdominal pain, nausea, loose stools.      Problem List:   1. Acute sigmoid diverticulitis with small intramural abscess  2. Monitor for possible developing fistula to the bladder  3. Leukocytosis improve  4. Hypertension  5. History of diabetes mellitus with last A1c of 5.9  6. Hypertension  7. Prior history of CVA  8. History of dyslipidemia    Continue inpatient care.  She remains at risk for clinical deterioration.   Appreciate input from CRS.  IV antibiotics changed to oral.  Advance to low fiber diet  -Normally still not not comfortable about being discharged in the hospital.  I will continue current monitoring here as inpatient while being transitioned to oral antibiotics and her diet being advanced to low fiber diet  Plans to finish 14-day course of total antibiotics regimen  -Patient's family requesting if we can provide her with Candida oral prophylaxis as he usually gets yeast infection when she is done antibiotics treatment  -Ordered one-time dose of Diflucan    -Patient requesting to stop insulin sliding scale.    Home regimen of nervous will be continued together with other regimen of levothyroxine and Zoloft with as needed trazodone    DVT Prophylaxis: Pneumatic Compression Devices  Code Status: DNR / DNI  Discharge Dispo: Back to [facility] facility  Estimated Disch Date / # of Days until Disch: Likely discharge in the next 24 hours if she continues to tolerate oral diet, and be  "transitioned to oral antibiotics  Updated patient's daughter over the phone      Interval History (Subjective):      Continuing  care today.  Seen and examined.  Chart reviewed.  As always Lata is very pleasant to talk to.  Remain in good spirits.  Tolerating liquid diet.  Excited about advancement of her diet.  Remain afebrile, denies any nausea, vomiting or abdominal pain              Physical Exam:      Last Vital Signs:  /69   Pulse 81   Temp 96.8  F (36  C) (Temporal)   Resp 18   Ht 1.651 m (5' 5\")   Wt 79.1 kg (174 lb 6.4 oz)   SpO2 94%   BMI 29.02 kg/m      I/O last 3 completed shifts:  In: 240 [P.O.:240]  Out: -   Wt Readings from Last 1 Encounters:   08/30/21 79.1 kg (174 lb 6.4 oz)     Vitals:    08/30/21 1500   Weight: 79.1 kg (174 lb 6.4 oz)       Constitutional: Awake, alert, cooperative, no apparent distress   Respiratory: Clear to auscultation bilaterally, no crackles or wheezing   Cardiovascular: Regular rate and rhythm, normal S1 and S2, and no murmur noted   Abdomen: Normal bowel sounds, soft, non-distended, non-tender   Skin: No rashes, no cyanosis, dry to touch   Neuro: Alert and oriented x3, no weakness, spontaneous and coherent speech   Extremities: No edema, normal range of motion   Other(s): Euthymic mood, not agitated       All other systems: Negative          Medications:      All current medications were reviewed with changes reflected in problem list.         Data:      All new lab and imaging data was reviewed.   Labs:  No results for input(s): CULT in the last 168 hours.  Recent Labs   Lab 09/01/21  0655 08/31/21  0654 08/30/21  1131   WBC 8.1 10.0 18.0*   HGB 13.1 11.5* 13.0   HCT 42.3 36.6 40.8   MCV 92 92 91    207 229     Recent Labs   Lab 09/01/21  0655 08/31/21  0654 08/31/21  0551 08/30/21  1131    140  --  139   POTASSIUM 4.0 3.9  --  3.5   CHLORIDE 110* 111*  --  107   CO2 28 24  --  24   ANIONGAP 1* 5  --  8   * 143* 124* 125*   BUN 7 9  --  " 18   CR 0.64 0.60  --  0.61   GFRESTIMATED 81 83  --  82   HERNAN 9.9 8.8  --  9.9     No results for input(s): SED, CRP in the last 168 hours.  Recent Labs   Lab 09/01/21  0655 08/31/21  0654 08/31/21  0551 08/31/21  0238 08/30/21  2154   * 143* 124* 131* 137*     No results for input(s): INR in the last 168 hours.  No results for input(s): TROPONIN, TROPI, TROPR in the last 168 hours.    Invalid input(s): TROP, TROPONINIES  No results for input(s): TSH in the last 168 hours.  Recent Labs   Lab 08/30/21  1308   COLOR Light Yellow   APPEARANCE Clear   URINEGLC Negative   URINEBILI Negative   URINEKETONE Negative   SG 1.010   UBLD Negative   URINEPH 5.5   PROTEIN 10 *   NITRITE Negative   LEUKEST Trace*   RBCU <1   WBCU 4      Imaging:   Results for orders placed or performed during the hospital encounter of 08/30/21   Abd/pelvis CT,  IV  contrast only TRAUMA / AAA    Narrative    CT ABDOMEN AND PELVIS WITH CONTRAST 8/30/2021 11:50 AM    CLINICAL HISTORY: Diverticulitis, complication suspected.    TECHNIQUE: CT scan of the abdomen and pelvis was performed following  injection of IV contrast. Multiplanar reformats were obtained. Dose  reduction techniques were used.    CONTRAST: 88mL Isovue-370    COMPARISON: CT abdomen and pelvis 7/17/2017.    FINDINGS:   LOWER CHEST: Normal.    HEPATOBILIARY: Normal.    PANCREAS: Normal.    SPLEEN: No acute abnormality. Granulomatous calcifications noted.    ADRENAL GLANDS: Normal.    KIDNEYS/BLADDER: No hydronephrosis or obstructing stone. Incidental  bilateral renal cysts not requiring specific imaging follow-up. No gas  within the bladder. See below bowel section regarding inflammation of  the bladder.    BOWEL: Acute diverticulitis at the mid sigmoid colon, series 4 image  174. There is adjacent edema. Hypodensity within the posterior sigmoid  colon wall is ill-defined measuring approximately 2.7 x 1 cm, series 4  image 179. No extraluminal abscess identified. There is  adjacent trace  fluid at the pelvis. There is small fluid extending posteriorly and  inferiorly from this position to contact the anterior upper bladder  wall, series 4 image 188. There is bladder wall thickening in this  region, image 190. Remainder of the bowel shows no obstruction or  acute inflammation. Appendix not visualized. No signs of appendicitis.    PELVIC ORGANS: Unremarkable.    ADDITIONAL FINDINGS: Scattered vascular calcifications. No suspicious  lymph node.    MUSCULOSKELETAL: Mild spine degenerative change.      Impression    IMPRESSION:   1.  Acute diverticulitis of the mid sigmoid colon. Small hypodensity  within the posterior sigmoid colon wall could be an intramural small  abscess. No extraluminal abscess can be seen. There is also fluid  extending from the diverticulitis causing a degree of tethering and  wall thickening of the adjacent anterior bladder. Developing fistula  is a possibility.  2.  No other acute abnormality.    RENY SAGASTUME MD         SYSTEM ID:  [id number]

## 2021-09-03 VITALS
TEMPERATURE: 97.6 F | HEART RATE: 71 BPM | HEIGHT: 65 IN | SYSTOLIC BLOOD PRESSURE: 162 MMHG | OXYGEN SATURATION: 94 % | BODY MASS INDEX: 29.06 KG/M2 | RESPIRATION RATE: 16 BRPM | WEIGHT: 174.4 LBS | DIASTOLIC BLOOD PRESSURE: 88 MMHG

## 2021-09-03 PROCEDURE — 250N000013 HC RX MED GY IP 250 OP 250 PS 637: Performed by: PHYSICIAN ASSISTANT

## 2021-09-03 PROCEDURE — 99239 HOSP IP/OBS DSCHRG MGMT >30: CPT | Performed by: INTERNAL MEDICINE

## 2021-09-03 PROCEDURE — 250N000013 HC RX MED GY IP 250 OP 250 PS 637: Performed by: INTERNAL MEDICINE

## 2021-09-03 RX ORDER — CIPROFLOXACIN 500 MG/1
500 TABLET, FILM COATED ORAL EVERY 12 HOURS
Qty: 20 TABLET | Refills: 0 | Status: SHIPPED | OUTPATIENT
Start: 2021-09-03 | End: 2021-09-14

## 2021-09-03 RX ORDER — METRONIDAZOLE 500 MG/1
500 TABLET ORAL 3 TIMES DAILY
Qty: 30 TABLET | Refills: 0 | Status: SHIPPED | OUTPATIENT
Start: 2021-09-03 | End: 2021-09-14

## 2021-09-03 RX ADMIN — LEVOTHYROXINE SODIUM 75 MCG: 0.07 TABLET ORAL at 09:10

## 2021-09-03 RX ADMIN — PANTOPRAZOLE SODIUM 40 MG: 40 TABLET, DELAYED RELEASE ORAL at 06:45

## 2021-09-03 RX ADMIN — POTASSIUM CHLORIDE 20 MEQ: 1500 TABLET, EXTENDED RELEASE ORAL at 09:10

## 2021-09-03 RX ADMIN — CIPROFLOXACIN 500 MG: 250 TABLET ORAL at 09:11

## 2021-09-03 RX ADMIN — SERTRALINE HYDROCHLORIDE 100 MG: 100 TABLET ORAL at 09:10

## 2021-09-03 RX ADMIN — METRONIDAZOLE 500 MG: 500 TABLET ORAL at 09:10

## 2021-09-03 RX ADMIN — ZINC SULFATE 220 MG (50 MG) CAPSULE 220 MG: CAPSULE at 09:10

## 2021-09-03 RX ADMIN — B-COMPLEX W/ C & FOLIC ACID TAB 1 TABLET: TAB at 09:09

## 2021-09-03 RX ADMIN — AMLODIPINE BESYLATE 10 MG: 10 TABLET ORAL at 09:10

## 2021-09-03 ASSESSMENT — ACTIVITIES OF DAILY LIVING (ADL)
ADLS_ACUITY_SCORE: 14

## 2021-09-03 NOTE — PLAN OF CARE
Pt A&Ox4. VSS afebrile RA. Saline locked discontinued. Abdomen soft and non tender. LBM 9/2/21. Pt denied pain. SOB and nausea. Tolerated low fiber diet. LS clear. Pt discharged home with daughter at 1115. Charge RN did  papers/questions and forms for discharge.

## 2021-09-03 NOTE — PROGRESS NOTES
[facility]  PROGRESS NOTE    September 3, 2021    SUBJECTIVE:  Mild abdominal discomfort intermittently.  +loose BMs. +flatus.  Tolerating low fiber.    OBJECTIVE:  Temp:  [97.4  F (36.3  C)-97.7  F (36.5  C)] 97.6  F (36.4  C)  Pulse:  [74-83] 74  Resp:  [16-18] 16  BP: (129-150)/(69-84) 150/84  SpO2:  [92 %-94 %] 94 %  No intake or output data in the 24 hours ending 09/03/21 0841    GENERAL:  Awake, alert, no acute distress, lying in bed.  HEAD: Nomocephalic atraumatic  SCLERA: anicteric  EXTREMITIES: warm and well perfused  ABDOMEN:  Soft, non tender, non-distended, no rebound or guarding, no peritoneal signs.    LABS:  Lab Results   Component Value Date    WBC 8.1 09/01/2021    WBC 9.0 03/04/2019     Lab Results   Component Value Date    HGB 13.1 09/01/2021    HGB 14.8 07/07/2020     Lab Results   Component Value Date    HCT 42.3 09/01/2021    HCT 44.1 03/04/2019     Lab Results   Component Value Date     09/01/2021     03/04/2019     Last Basic Metabolic Panel:  Lab Results   Component Value Date     09/01/2021     01/11/2021      Lab Results   Component Value Date    POTASSIUM 4.0 09/01/2021    POTASSIUM 4.1 01/11/2021     Lab Results   Component Value Date    CHLORIDE 110 09/01/2021    CHLORIDE 110 01/11/2021     Lab Results   Component Value Date    HERNAN 9.9 09/01/2021    HERNAN 9.9 01/11/2021     Lab Results   Component Value Date    CO2 28 09/01/2021    CO2 25 01/11/2021     Lab Results   Component Value Date    BUN 7 09/01/2021    BUN 20 01/11/2021     Lab Results   Component Value Date    CR 0.64 09/01/2021    CR 0.67 01/11/2021     Lab Results   Component Value Date     09/01/2021     01/11/2021       ASSESSMENT/PLAN:  86 year old woman admitted with diverticulitis with small intramural abscess.  Afebrile.      - Low fiber diet for at least 10 days  - Continue antibiotics for a total of 14 days  - Pain management as needed  - Encourage ambulation  -  Monitor I&Os  - Ok to discharge today      For questions/paging, please contact the CRS office at [phone].    Paula Alaniz PA-C  [facility]  Phone: [phone]

## 2021-09-03 NOTE — CONSULTS
NUTRITION EDUCATION      REASON FOR ASSESSMENT:  Consult for diverticulitis diet education    NUTRITION HISTORY:  Information obtained from pt.    CURRENT DIET:  Orders Placed This Encounter      Low Fiber Diet    NUTRITION DIAGNOSIS:  Food- and nutrition-related knowledge deficit R/t diverticulitis    INTERVENTIONS:    Nutrition Prescription:  13g fiber-restricted diet    Implementation:      *  Nutrition Education (Content):   A)  Provided handout: fiber -restricted diet    B)  Discussed foods to avoid and foods that are recommended within dietary restrictions      *  Nutrition Education (Application):   A)  Discussed current eating habits and recommended alternative food choices      *  Anticipate good compliance      *  Diet Education - refer to Education Flowsheet    Goals:      *  Patient will verbalize understanding of diet by asking questions, naming foods she likes that are appropriate      *  All of the above goals met during the education session    Follow Up/Monitoring:      *  Provided RD contact information for future questions      *  Recommended Out-Patient Nutrition Referral, if further diet instructions are needed    Deepa Rich RDN, LD  Clinical Dietitian

## 2021-09-03 NOTE — PLAN OF CARE
Pt A/Ox4. Ind in room with walker. PO Flagyl and Cipro. Pt denies pain, nausea. VSS. Discharge home today.

## 2021-09-03 NOTE — TELEPHONE ENCOUNTER
Pt has appt on 9/14 with Dr Kennedy for Hospital follow up. Is this OK?   She was discharged today.

## 2021-09-04 LAB
BACTERIA BLD CULT: NO GROWTH
BACTERIA BLD CULT: NO GROWTH

## 2021-09-07 ENCOUNTER — PATIENT OUTREACH (OUTPATIENT)
Dept: INTERNAL MEDICINE | Facility: CLINIC | Age: 86
End: 2021-09-07

## 2021-09-07 NOTE — TELEPHONE ENCOUNTER
IP F/U    Date: 9/3/21  Diagnosis: diverticulitis  Is patient active in care coordination? No  Was patient in TCU? No    Next 5 appointments (look out 90 days)    Sep 14, 2021  3:40 PM  Office Visit with Adelita Kennedy MD  [facility] ([facility] ) [address]  [facility] [address]  [phone]         Pt w rash, discussed w mom may be reaction to medications administered during biopsy or viral exanthem.  Noted decr WBC on CBC, discussed viral suppression w mom vs other causes.  Pt currently undergoing outpt work up, mom states she will follow up.  Pt improved in ED w benadryl.  Discussed results and outcome of testing with the patient, given copy as well.  Patient advised to please follow up with their primary care doctor within the next 24 hours and return to the Emergency Department for worsening symptoms or any other concerns.  Patient advised that their doctor may call  to follow up on the specific results of the tests performed today in the emergency department.

## 2021-09-08 NOTE — TELEPHONE ENCOUNTER
Appointment made for 9/14/21 hospital follow up  Will close this encounter  Next 5 appointments (look out 90 days)    Sep 14, 2021  3:40 PM  Office Visit with Adelita Kennedy MD  [facility] ([facility] ) [address]  [facility] [address]  [phone]

## 2021-09-09 NOTE — DISCHARGE SUMMARY
Physician Discharge Summary           [facility]  Hospitalist Discharge Summary-[facility]    Name: Norma F Alpers    MRN: [id number]     YOB: 1934    Age: 86 year old                                                 Primary care provider: Adelita Kennedy      Admit date:  8/30/2021      Discharge date and time: 9/3/2021 11:13 AM       Discharge Physician:  Orlando Rachel MD      Primary Discharge Diagnosis      Acute sigmoid diverticulitis with small intramural abscess    Secondary Diagnosis /chronic medical conditions         Past Medical History:   Diagnosis Date     CVA (cerebral infarction) 06/11/2012     Diabetes mellitus (H)      GERD (gastroesophageal reflux disease)      Hyperlipidemia      Hypertension      Thyroid nodule 4/9/2015     Unspecified cerebral artery occlusion with cerebral infarction 6/2012    no residual - was aphasic for awhile after CVA         Past Surgical History:      Past Surgical History:   Procedure Laterality Date     APPENDECTOMY      Ruptured --peritonitis     COCCYGECTOMY  1/2011    Zuleyka Vernell     HERNIA REPAIR      Abdomen     HYSTERECTOMY, PAP NO LONGER INDICATED  1997     RELEASE TRIGGER FINGER  4/2/2014    Procedure: RELEASE TRIGGER FINGER;  Release Trigger Thumb ;  Surgeon: Gurpreet Mast MD;  Location:  OR     [facility] NONSPECIFIC PROCEDURE      Left foot surgery               Brief Summary of Hospital stay :       Please refer to  Admission H&P note for full details of patient presentation.    Reason for Hospitalization(C/C,HPI and brief patient summary):abdominal pain.      Significant findings(Primary diagnosis )Procedures and treatments provided(Hospital course ,consults, procedures):Please see bellow for details  Norma F Alpers is an 86 year old female with a history of hyperlipidemia, hypertension, thyroid nodule, CVA, type 2 diabetes, depression, seen at the request of Carmen Nunez PA-C, who presents with abdominal  "pain.     Patient reports that starting about 1 week ago she developed lower abdominal pain that has progressively worsened.  She has had associated symptoms of decreased appetite, bloating, and nausea but no vomiting.  She also noticed that her stool was dark in color about a week ago at the start of her abdominal pain.  The stool has been formed until more recently it has been loose.  She states her last bowel movement was this morning and it was brown in color.     In the ED, patient afebrile and vitals within normal limits.  Labs revealed WBC 18, lactic acid 1.  CT showed \"Acute diverticulitis of the mid sigmoid colon. Small hypodensity  within the posterior sigmoid colon wall could be an intramural small abscess. No extraluminal abscess can be seen. There is also fluid extending from the diverticulitis causing a degree of tethering and wall thickening of the adjacent anterior bladder. Developing fistula is a possibility. No other acute abnormality.\"  She was admitted for further management.     Patient reports her abdominal pain is much better since she was admitted.  No further nausea.  She last passed stool this morning and it was brown.  No bright red blood.  No fecaluria, but she possibly thinks she had pneumaturia this morning.  UA appears clean.  She denies ever needing antibiotics or hospitalization for diverticulitis in the past.  She reports a family history of diverticulitis.    CRS consulted and patient was recommended conservative care.    --Low fiber diet  - was treated with cipro and flagyl ,transition to PO antibiotics  - Pain management as needed  - No plans for surgery at this time  - Ok to discharge    Consultations during hospital stay:       CARE MANAGEMENT / SOCIAL WORK IP CONSULT  COLORECTAL SURGERY IP CONSULT  CARE MANAGEMENT / SOCIAL WORK IP CONSULT  NUTRITION SERVICES ADULT IP CONSULT      Patient discharge Condition:     stable    BP (!) 162/88 (BP Location: Left arm)   Pulse 71   " "Temp 97.6  F (36.4  C) (Temporal)   Resp 16   Ht 1.651 m (5' 5\")   Wt 79.1 kg (174 lb 6.4 oz)   SpO2 94%   BMI 29.02 kg/m         Discharge Instructions:       Patient/family instructions: Written discharge instruction given to patient/family    Discharge Medications:       Review of your medicines      START taking      Dose / Directions   ciprofloxacin 500 MG tablet  Commonly known as: CIPRO  Indication: Infection Within the Abdomen  Used for: Sigmoid diverticulitis      Dose: 500 mg  Take 1 tablet (500 mg) by mouth every 12 hours for 10 days  Quantity: 20 tablet  Refills: 0     metroNIDAZOLE 500 MG tablet  Commonly known as: FLAGYL  Indication: Infection Within the Abdomen  Used for: Sigmoid diverticulitis      Dose: 500 mg  Take 1 tablet (500 mg) by mouth 3 times daily for 10 days  Quantity: 30 tablet  Refills: 0        CONTINUE these medicines which have NOT CHANGED      Dose / Directions   acetaminophen 500 MG tablet  Commonly known as: TYLENOL      Dose: 500 mg  Take 500 mg by mouth every 6 hours as needed for mild pain  Refills: 0     albuterol 108 (90 Base) MCG/ACT inhaler  Commonly known as: PROAIR HFA/PROVENTIL HFA/VENTOLIN HFA  Used for: Dyspnea on exertion      Dose: 2 puff  Inhale 2 puffs into the lungs every 6 hours  Quantity: 1 Inhaler  Refills: 11     amLODIPine 5 MG tablet  Commonly known as: NORVASC  Used for: Benign essential hypertension      TAKE 1 TABLET BY MOUTH EVERY DAY  Quantity: 90 tablet  Refills: 1     aspirin 325 MG tablet  Commonly known as: ASA      Dose: 325 mg  Take 325 mg by mouth daily  Refills: 0     blood glucose test strip  Commonly known as: Accu-Chek SmartView  Used for: Type 2 diabetes mellitus with other circulatory complications (H)      Use to test blood sugar 2-3 times daily or as directed.  Quantity: 100 strip  Refills: prn     CALCIUM 600 PO      Dose: 1 tablet  Take 1 tablet by mouth daily 2 times daily  Refills: 0     cetirizine 10 MG tablet  Commonly known as: " zyrTEC      Dose: 10 mg  Take 10 mg by mouth daily as needed for allergies  Refills: 0     EPINEPHrine 0.3 MG/0.3ML injection 2-pack  Commonly known as: ANY BX GENERIC EQUIV  Used for: Bee allergy status      Dose: 0.3 mg  Inject 0.3 mLs (0.3 mg) into the muscle once as needed for anaphylaxis  Quantity: 2 each  Refills: 5     glimepiride 1 MG tablet  Commonly known as: AMARYL  Used for: Type 2 diabetes mellitus without complication, without long-term current use of insulin (H)      TAKE 1 TABLET (1 MG) BY MOUTH EVERY MORNING (BEFORE BREAKFAST)  Quantity: 90 tablet  Refills: 1     glucosamine-chondroitinoitin 750-600 MG Tabs      Dose: 1 tablet  Take 1 tablet by mouth 2 times daily.  Refills: 0     hydrochlorothiazide 25 MG tablet  Commonly known as: HYDRODIURIL      Dose: 12.5 mg  Take 12.5 mg by mouth daily as needed  Refills: 0     levothyroxine 75 MCG tablet  Commonly known as: SYNTHROID/LEVOTHROID  Used for: Acquired hypothyroidism      Dose: 75 mcg  TAKE 1 TABLET (75 MCG) BY MOUTH EVERY MORNING  Quantity: 90 tablet  Refills: 3     omega-3 fatty acids 1200 MG capsule      Dose: 1 capsule  Take 1 capsule by mouth daily.  Refills: 0     potassium chloride ER 20 MEQ CR tablet  Commonly known as: KLOR-CON  Used for: Benign essential hypertension      TAKE 1 TABLET BY MOUTH 2 TIMES DAILY  Quantity: 180 tablet  Refills: 3     pravastatin 80 MG tablet  Commonly known as: PRAVACHOL  Used for: Hyperlipidemia LDL goal <100      TAKE 1 TABLET BY MOUTH EVERY DAY  Quantity: 90 tablet  Refills: 0     PRESERVISION AREDS 2 PO      Dose: 1 tablet  Take 1 tablet by mouth daily  Refills: 0     raloxifene 60 MG tablet  Commonly known as: EVISTA  Used for: Osteopenia, unspecified location      Dose: 1 tablet  Take 1 tablet (60 mg) by mouth daily  Quantity: 90 tablet  Refills: 3     sertraline 100 MG tablet  Commonly known as: ZOLOFT  Used for: Anxiety      TAKE 1 TABLET BY MOUTH EVERY DAY  Quantity: 90 tablet  Refills: 1     thin  lancets  Commonly known as: NO BRAND SPECIFIED  Used for: Type 2 diabetes mellitus with other circulatory complications (H)      Dose: 1 each  1 each daily  Quantity: 100 each  Refills: 4     traZODone 150 MG tablet  Commonly known as: DESYREL  Used for: Insomnia, unspecified type      TAKE 1 TABLET BY MOUTH AT BEDTIME  Quantity: 90 tablet  Refills: 1     TURMERIC PO      Dose: 1 tablet  Take 1 tablet by mouth daily  Refills: 0     vitamin B complex with vitamin C tablet      Dose: 1 tablet  Take 1 tablet by mouth daily  Refills: 0     ZINC-220 PO      Dose: 1 tablet  Take 1 tablet by mouth daily as needed  Refills: 0           Where to get your medicines      These medications were sent to [place], [place] - [address] [facility]  [address] [facility] [address]    Phone: [phone]     ciprofloxacin 500 MG tablet    metroNIDAZOLE 500 MG tablet          Discharge diet:Orders Placed This Encounter      Diet    low fiber       Discharge activity:Activity as tolerated      Discharge follow-up:    Follow up with primary care provider in 7 days or earlier if symptoms return or gets worse.    Follow up with consultant as instructed  with CRS       Other instructions:    We discussed with patient/family about detail discharge instructions as well as discharge medications above including potential risks,side effects and benefits.Patient/family understood benefits and potential serious side effects of taking these medications and need to follow up with PCP if the patient develops complications.  Patient is also advised to see a doctor immediately for severe symptoms.        Major procedure performed/  Significant Diagnostic Studies:       Results for orders placed or performed during the hospital encounter of 08/30/21   Abd/pelvis CT,  IV  contrast only TRAUMA / AAA    Narrative    CT ABDOMEN AND PELVIS WITH CONTRAST 8/30/2021 11:50 AM    CLINICAL HISTORY: Diverticulitis,  complication suspected.    TECHNIQUE: CT scan of the abdomen and pelvis was performed following  injection of IV contrast. Multiplanar reformats were obtained. Dose  reduction techniques were used.    CONTRAST: 88mL Isovue-370    COMPARISON: CT abdomen and pelvis 7/17/2017.    FINDINGS:   LOWER CHEST: Normal.    HEPATOBILIARY: Normal.    PANCREAS: Normal.    SPLEEN: No acute abnormality. Granulomatous calcifications noted.    ADRENAL GLANDS: Normal.    KIDNEYS/BLADDER: No hydronephrosis or obstructing stone. Incidental  bilateral renal cysts not requiring specific imaging follow-up. No gas  within the bladder. See below bowel section regarding inflammation of  the bladder.    BOWEL: Acute diverticulitis at the mid sigmoid colon, series 4 image  174. There is adjacent edema. Hypodensity within the posterior sigmoid  colon wall is ill-defined measuring approximately 2.7 x 1 cm, series 4  image 179. No extraluminal abscess identified. There is adjacent trace  fluid at the pelvis. There is small fluid extending posteriorly and  inferiorly from this position to contact the anterior upper bladder  wall, series 4 image 188. There is bladder wall thickening in this  region, image 190. Remainder of the bowel shows no obstruction or  acute inflammation. Appendix not visualized. No signs of appendicitis.    PELVIC ORGANS: Unremarkable.    ADDITIONAL FINDINGS: Scattered vascular calcifications. No suspicious  lymph node.    MUSCULOSKELETAL: Mild spine degenerative change.      Impression    IMPRESSION:   1.  Acute diverticulitis of the mid sigmoid colon. Small hypodensity  within the posterior sigmoid colon wall could be an intramural small  abscess. No extraluminal abscess can be seen. There is also fluid  extending from the diverticulitis causing a degree of tethering and  wall thickening of the adjacent anterior bladder. Developing fistula  is a possibility.  2.  No other acute abnormality.    RENY SAGASTUME MD         SYSTEM  ID:  [id number]           Pending Results:       Unresulted Labs Ordered in the Past 30 Days of this Admission     No orders found from 7/31/2021 to 8/31/2021.             Patient Allergies:       Allergies   Allergen Reactions     Bee Venom      Throat closes     No Clinical Screening - See Comments Shortness Of Breath     SOB at dentist office - thinks it was novacaine     Hydralazine      palpitations     Lisinopril      angioedema     Penicillin G Swelling     Seasonal Allergies          Disposition:     Disposition: home      I saw and evaluated the patient on day of discharge and  discharge instructions reviewed  and  all the patient's questions and concerns addressed. Over 30 minutes spent on discharge and coordination of discharge process for this patient.      Disclaimer: This note consists of symbols derived from keyboarding, dictation and/or voice recognition software. As a result, there may be errors in the script that have gone undetected. Please consider this when interpreting information found in this chart

## 2021-09-14 ENCOUNTER — OFFICE VISIT (OUTPATIENT)
Dept: INTERNAL MEDICINE | Facility: CLINIC | Age: 86
End: 2021-09-14
Payer: COMMERCIAL

## 2021-09-14 VITALS
RESPIRATION RATE: 16 BRPM | BODY MASS INDEX: 28.47 KG/M2 | TEMPERATURE: 97.7 F | OXYGEN SATURATION: 97 % | HEART RATE: 90 BPM | HEIGHT: 65 IN | DIASTOLIC BLOOD PRESSURE: 82 MMHG | WEIGHT: 170.9 LBS | SYSTOLIC BLOOD PRESSURE: 135 MMHG

## 2021-09-14 DIAGNOSIS — K65.1 LEFT LOWER QUADRANT ABDOMINAL ABSCESS (H): ICD-10-CM

## 2021-09-14 DIAGNOSIS — K57.32 SIGMOID DIVERTICULITIS: Primary | ICD-10-CM

## 2021-09-14 PROCEDURE — 90662 IIV NO PRSV INCREASED AG IM: CPT | Performed by: INTERNAL MEDICINE

## 2021-09-14 PROCEDURE — G0008 ADMIN INFLUENZA VIRUS VAC: HCPCS | Performed by: INTERNAL MEDICINE

## 2021-09-14 PROCEDURE — 99213 OFFICE O/P EST LOW 20 MIN: CPT | Mod: 25 | Performed by: INTERNAL MEDICINE

## 2021-09-14 ASSESSMENT — MIFFLIN-ST. JEOR: SCORE: 1216.08

## 2021-09-14 NOTE — PROGRESS NOTES
Assessment & Plan     Sigmoid diverticulitis  Resolving, symptoms are gone, exam benign.  Advised that she should monitor carefully  the next few days to weekfor any recurrent symptoms and call right away so can reassess, consider CT, restarting meds    Left lower quadrant abdominal abscess (H)  Seems to be resolving. Would not recheck unless any recurrent symptoms; see above.     Return in about 5 months (around 2/2/2022) for Diabetes, see me a week after lab.    Adelita Kennedy MD  [facility] KENTON Guido is a 86 year old who presents for the following health issues     HPI       Hospital Follow-up Visit:    Hospital/Nursing Home/IP Rehab Facility: [facility]  Date of Admission: 08/30/2021  Date of Discharge: 09/03/2021  Reason(s) for Admission: Sigmoid diverticulitis      Was your hospitalization related to COVID-19? No   Problems taking medications regularly:  None  Medication changes since discharge: ciprofloxacin and metronidazole completed   Problems adhering to non-medication therapy:  None    Summary of hospitalization:  [facility] discharge summary reviewed  Diagnostic Tests/Treatments reviewed.  Follow up needed: none  Other Healthcare Providers Involved in Patient s Care:         None  Update since discharge: improved.     She reports her abdominal pain is gone. She has not had any fever, chills. She did have some loose stools, 2-3 per day when first went home but resolved.   Post Discharge Medication Reconciliation: discharge medications reconciled, continue medications without change.  Plan of care communicated with patient and family          Patient Active Problem List   Diagnosis     Advanced directives, counseling/discussion     Benign essential hypertension     Hyperlipidemia LDL goal <100     GERD (gastroesophageal reflux disease)     Aphasic stroke     Osteopenia     Acquired hypothyroidism     Anxiety     Thyroid nodule      Type 2 diabetes mellitus without complication, without long-term current use of insulin (H)     Irritable bowel syndrome with both constipation and diarrhea     Major depressive disorder with single episode, in partial remission (H)     Exudative age-related macular degeneration of right eye with inactive scar (H)     Sigmoid diverticulitis     Current Outpatient Medications   Medication Sig Dispense Refill     acetaminophen (TYLENOL) 500 MG tablet Take 500 mg by mouth every 6 hours as needed for mild pain       albuterol (PROAIR HFA/PROVENTIL HFA/VENTOLIN HFA) 108 (90 Base) MCG/ACT inhaler Inhale 2 puffs into the lungs every 6 hours 1 Inhaler 11     amLODIPine (NORVASC) 5 MG tablet TAKE 1 TABLET BY MOUTH EVERY DAY 90 tablet 1     aspirin 325 MG tablet Take 325 mg by mouth daily        blood glucose monitoring (ACCU-CHEK SMARTVIEW) test strip Use to test blood sugar 2-3 times daily or as directed. 100 strip prn     Calcium Carbonate (CALCIUM 600 PO) Take 1 tablet by mouth daily 2 times daily       cetirizine (ZYRTEC) 10 MG tablet Take 10 mg by mouth daily as needed for allergies       EPINEPHrine (EPIPEN) 0.3 MG/0.3ML injection Inject 0.3 mLs (0.3 mg) into the muscle once as needed for anaphylaxis 2 each 5     glimepiride (AMARYL) 1 MG tablet TAKE 1 TABLET (1 MG) BY MOUTH EVERY MORNING (BEFORE BREAKFAST) 90 tablet 1     glucosamine-chondroitinoitin 750-600 MG TABS Take 1 tablet by mouth 2 times daily.         hydrochlorothiazide (HYDRODIURIL) 25 MG tablet Take 12.5 mg by mouth daily as needed       levothyroxine (SYNTHROID/LEVOTHROID) 75 MCG tablet TAKE 1 TABLET (75 MCG) BY MOUTH EVERY MORNING 90 tablet 3     Multiple Vitamins-Minerals (PRESERVISION AREDS 2 PO) Take 1 tablet by mouth daily       omega-3 fatty acids (FISH OIL) 1200 MG capsule Take 1 capsule by mouth daily.       potassium chloride ER (KLOR-CON) 20 MEQ CR tablet TAKE 1 TABLET BY MOUTH 2 TIMES DAILY 180 tablet 3     pravastatin (PRAVACHOL) 80 MG  "tablet TAKE 1 TABLET BY MOUTH EVERY DAY 90 tablet 0     raloxifene (EVISTA) 60 MG tablet Take 1 tablet (60 mg) by mouth daily 90 tablet 3     sertraline (ZOLOFT) 100 MG tablet TAKE 1 TABLET BY MOUTH EVERY DAY 90 tablet 1     thin (NO BRAND SPECIFIED) lancets 1 each daily 100 each 4     traZODone (DESYREL) 150 MG tablet TAKE 1 TABLET BY MOUTH AT BEDTIME 90 tablet 1     TURMERIC PO Take 1 tablet by mouth daily        vitamin B complex with vitamin C (VITAMIN  B COMPLEX) TABS tablet Take 1 tablet by mouth daily       Zinc Sulfate (ZINC-220 PO) Take 1 tablet by mouth daily as needed        Social History     Tobacco Use     Smoking status: Former Smoker     Packs/day: 0.00     Quit date: 3/27/1975     Years since quittin.5     Smokeless tobacco: Never Used   Vaping Use     Vaping Use: Never used   Substance Use Topics     Alcohol use: Yes     Comment: occasionally     Drug use: No      Review of Systems   No fever, chills, nausea, vomiting, blood in stool      Objective    /82 (BP Location: Left arm, Patient Position: Chair, Cuff Size: Adult Large)   Pulse 90   Temp 97.7  F (36.5  C) (Oral)   Resp 16   Ht 1.651 m (5' 5\")   Wt 77.5 kg (170 lb 14.4 oz)   SpO2 97%   BMI 28.44 kg/m    Body mass index is 28.44 kg/m .  Physical Exam       Abdomen: normal bowel sounds, non distended, soft, nontender            "

## 2022-01-01 ENCOUNTER — TRANSFERRED RECORDS (OUTPATIENT)
Dept: MULTI SPECIALTY CLINIC | Facility: CLINIC | Age: 87
End: 2022-01-01

## 2022-01-01 LAB — RETINOPATHY: NORMAL

## 2022-03-01 ENCOUNTER — TELEPHONE (OUTPATIENT)
Dept: INTERNAL MEDICINE | Facility: CLINIC | Age: 87
End: 2022-03-01
Payer: COMMERCIAL

## 2022-03-01 DIAGNOSIS — E78.5 HYPERLIPIDEMIA LDL GOAL <100: ICD-10-CM

## 2022-03-01 DIAGNOSIS — E11.9 TYPE 2 DIABETES MELLITUS WITHOUT COMPLICATION, WITHOUT LONG-TERM CURRENT USE OF INSULIN (H): Primary | ICD-10-CM

## 2022-03-01 DIAGNOSIS — E03.9 ACQUIRED HYPOTHYROIDISM: ICD-10-CM

## 2022-03-01 NOTE — TELEPHONE ENCOUNTER
Patient has a diabetic appt 4/5/2022 and would like lab work placed before the appt  Ok to call and lm [phone]

## 2022-03-04 NOTE — TELEPHONE ENCOUNTER
Call to patient. Message left for return call to clinic.     Alejandra CULLEN RN   [facility]      Appointments in Next Year        Apr 05, 2022  4:00 PM  (Arrive by 3:40 PM)  Provider Visit with Adelita Kennedy MD  [facility] ([facility] ) [phone]

## 2022-03-14 ENCOUNTER — OFFICE VISIT (OUTPATIENT)
Dept: OTHER | Facility: CLINIC | Age: 87
End: 2022-03-14
Payer: COMMERCIAL

## 2022-03-14 VITALS
BODY MASS INDEX: 28.32 KG/M2 | TEMPERATURE: 98.8 F | RESPIRATION RATE: 20 BRPM | DIASTOLIC BLOOD PRESSURE: 82 MMHG | WEIGHT: 170 LBS | HEART RATE: 92 BPM | SYSTOLIC BLOOD PRESSURE: 142 MMHG | HEIGHT: 65 IN | OXYGEN SATURATION: 94 %

## 2022-03-14 DIAGNOSIS — Z00.00 ENCOUNTER FOR MEDICARE ANNUAL WELLNESS EXAM: Primary | ICD-10-CM

## 2022-03-14 PROCEDURE — G0439 PPPS, SUBSEQ VISIT: HCPCS | Performed by: FAMILY MEDICINE

## 2022-03-14 ASSESSMENT — ACTIVITIES OF DAILY LIVING (ADL)
CURRENT_FUNCTION: HOUSEWORK REQUIRES ASSISTANCE
CURRENT_FUNCTION: TRANSPORTATION REQUIRES ASSISTANCE
CURRENT_FUNCTION: SHOPPING REQUIRES ASSISTANCE

## 2022-03-14 ASSESSMENT — PATIENT HEALTH QUESTIONNAIRE - PHQ9: SUM OF ALL RESPONSES TO PHQ QUESTIONS 1-9: 2

## 2022-03-14 NOTE — PATIENT INSTRUCTIONS
Patient Education   Personalized Prevention Plan  You are due for the preventive services outlined below.  Your care team is available to assist you in scheduling these services.  If you have already completed any of these items, please share that information with your care team to update in your medical record.  Health Maintenance Due   Topic Date Due     ANNUAL REVIEW OF  ORDERS  Never done     Diabetic Foot Exam  09/30/2020     FALL RISK ASSESSMENT  09/30/2020     Cholesterol Lab  01/11/2022     Kidney Microalbumin Urine Test  01/11/2022     A1C Lab  01/26/2022       Preventing Falls at Home  A person can fall for many reasons. Older adults may fall because reaction time slows down as we age. Your muscles and joints may get stiff, weak, or less flexible because of illness, medicines, or a physical condition.   Other health problems that make falls more likely include:     Arthritis    Dizziness or lightheadedness when you stand up (orthostatic hypotension)    History of a stroke    Dizziness    Anemia    Certain medicines taken for mental illness or to control blood pressure.    Problems with balance or gait    Bladder or urinary problems    History of falling    Changes in vision (vision impairment)    Changes in thinking skills and memory (cognitive impairment)  Injuries from a fall can include serious injuries such as broken bones, dislocated joints, internal bleeding and cuts. Injuries like these can limit your independence.   Prevention tips  To help prevent falls and fall-related injuries, follow the tips below.    Floors  To make floors safer:     Put nonskid pads under area rugs.    Remove small rugs.    Replace worn floor coverings.    Tack carpets firmly to each step on carpeted stairs. Put nonskid strips on the edges of uncarpeted stairs.    Keep floors and stairs free of clutter and cords.    Arrange furniture so there are clear pathways.    Clean up any spills right away.  Bathrooms    To make  bathrooms safer:     Install grab bars in the tub or shower.    Apply nonskid strips or put a nonskid rubber mat in the tub or shower.    Sit on a bath chair to bathe.    Use bathmats with nonskid backing.  Lighting  To improve visibility in your home:      Keep a flashlight in each room. Or put a lamp next to the bed within easy reach.    Put nightlights in the bedrooms, hallways, kitchen, and bathrooms.    Make sure all stairways have good lighting.    Take your time when going up and down stairs.    Put handrails on both sides of stairs and in walkways for more support. To prevent injury to your wrist or arm, don t use handrails to pull yourself up.    Install grab bars to pull yourself up.    Move or rearrange items that you use often. This will make them easier to find or reach.    Look at your home to find any safety hazards. Especially look at doorways, walkways, and the driveway. Remove or repair any safety problems that you find.  Other changes to make    Look around to find any safety hazards. Look closely at doorways, walkways, and the driveway. Remove or repair any safety problems that you find.    Wear shoes that fit well.    Take your time when going up and down stairs.    Put handrails on both sides of stairs and in walkways for more support. To prevent injury to your wrist or arm, don t use handrails to pull yourself up.    Install grab bars wherever needed to pull yourself up.    Arrange items that you use often. This will make them easier to find or reach.    [employer] last reviewed this educational content on 3/1/2020    6760-5907 [employer]. All rights reserved. This information is not intended as a substitute for professional medical care. Always follow your healthcare professional's instructions.

## 2022-03-24 ENCOUNTER — TELEPHONE (OUTPATIENT)
Dept: INTERNAL MEDICINE | Facility: CLINIC | Age: 87
End: 2022-03-24
Payer: COMMERCIAL

## 2022-03-24 NOTE — TELEPHONE ENCOUNTER
Prior Authorization Specialty Medication Request    Medication/Dose: glimepiride  ICD code (if different than what is on RX):    Previously Tried and Failed:      Important Lab Values:   Rationale:     Insurance Name: not provided  Insurance ID: not provided  Insurance Phone Number: not provided    Pharmacy Information (if different than what is on RX)  Name:  [facility]  Phone:  [phone]

## 2022-03-29 NOTE — TELEPHONE ENCOUNTER
PRIOR AUTHORIZATION DENIED    Medication: glimepiride    Denial Date: 3/29/2022    Denial Rational:  Patient must have a history of trial & failure to the formulary alternative(s) or have a contraindication or intolerance to the formulary alternatives:                Appeal Information:    If you would like to appeal, please supply P/A team with a letter of medical necessity with clinical reason.

## 2022-03-29 NOTE — TELEPHONE ENCOUNTER
Central Prior Authorization Team   Phone: [phone]    PA Initiation    Medication: glimepiride  Insurance Company: [employer] ([facility]) - Phone [phone] Fax [phone]  Pharmacy Filling the Rx: [facility]/PHARMACY #[id number] [place], [place] - [address]  Filling Pharmacy Phone: [phone]  Filling Pharmacy Fax:    Start Date: 3/29/2022

## 2022-03-29 NOTE — TELEPHONE ENCOUNTER
Please call and advise patient that glimepiride is no longer covered under her insurance.  She has her lab on Thursday and will see me next week so I want to see her labs before we decide if she needs to be on an alternative medication.  If she still has glimepiride, she can continue taking it.  If she has run out, she can stay off of it and will discuss it next week.

## 2022-03-30 NOTE — TELEPHONE ENCOUNTER
Call placed to patient. Advised of prior authorization denial. Advised of Dr. Kennedy's message. Patient has glimeperide still and will continue taking it until it runs out/gets to appointment.    Patient will call with further questions if needed.

## 2022-03-31 ENCOUNTER — LAB (OUTPATIENT)
Dept: LAB | Facility: CLINIC | Age: 87
End: 2022-03-31
Payer: COMMERCIAL

## 2022-03-31 ENCOUNTER — TELEPHONE (OUTPATIENT)
Dept: INTERNAL MEDICINE | Facility: CLINIC | Age: 87
End: 2022-03-31

## 2022-03-31 DIAGNOSIS — E03.9 ACQUIRED HYPOTHYROIDISM: ICD-10-CM

## 2022-03-31 DIAGNOSIS — E11.9 TYPE 2 DIABETES MELLITUS WITHOUT COMPLICATION, WITHOUT LONG-TERM CURRENT USE OF INSULIN (H): ICD-10-CM

## 2022-03-31 DIAGNOSIS — E78.5 HYPERLIPIDEMIA LDL GOAL <100: ICD-10-CM

## 2022-03-31 LAB — HBA1C MFR BLD: 6 % (ref 0–5.6)

## 2022-03-31 PROCEDURE — 36415 COLL VENOUS BLD VENIPUNCTURE: CPT

## 2022-03-31 PROCEDURE — 83036 HEMOGLOBIN GLYCOSYLATED A1C: CPT

## 2022-03-31 PROCEDURE — 80061 LIPID PANEL: CPT

## 2022-03-31 PROCEDURE — 84443 ASSAY THYROID STIM HORMONE: CPT

## 2022-03-31 PROCEDURE — 82043 UR ALBUMIN QUANTITATIVE: CPT

## 2022-03-31 PROCEDURE — 80048 BASIC METABOLIC PNL TOTAL CA: CPT

## 2022-03-31 NOTE — TELEPHONE ENCOUNTER
Reason for Call:  Same Day Appointment, Requested Provider:  appt asap or would like to see Dr Schmitt    PCP: Adelita Johnson    Reason for visit: diabetic ck    Duration of symptoms: diabetic ck    Have you been treated for this in the past? Yes    Additional comments: the patient has a appt scheduled w/dr johnson on 4.5.2022, the daughter Bessy walked into the clinic today to indicate that the current scheduled appt for 04.05.2022 does not work with Bessy she wants to have a different day asap or would like to see Dr Schmitt if it doesn't work out with Dr Johnson.  Bessy declines a virtual appt, she walked into the clinic today to try to schedule an upcoming appt soon.    Can we leave a detailed message on this number? YES    Phone number patient can be reached at: Cell number on file:  Bessy  Telephone Information:   Mobile [phone]     Best Time: anytime    Call taken on 3/31/2022 at 10:06 AM by Lisa Magana

## 2022-03-31 NOTE — TELEPHONE ENCOUNTER
I can see her at 1:20 on Thursday, 4/7/2022. Please advise patient and place her on the schedule.

## 2022-03-31 NOTE — TELEPHONE ENCOUNTER
Dr Kennedy has no other openings until May.   Bessy states she has other appointments in [place] on Tuesday, 4/5, this is why she needs to reschedule.     Can check with Dr Schmitt. Bessy states Dr Schmitt is family friend.   Bessy states Lata has some concerns about sleeping medication, and BP medication along with her diabetes medications.   Dr Schmitt are you able to work Lata in your schedule in the next week? Wed, Thurs, Friday?

## 2022-03-31 NOTE — TELEPHONE ENCOUNTER
Call to patient's daughter, Bessy (on consent to communicate). Bessy informed of Dr. Schmitt's below response. Bessy verbalized understanding and appointment scheduled.   Bessy states she will call back later to possibly cancel the appointment with Dr. Kennedy on 4/5/2022.     Alejandra CULLEN RN   [facility]      Appointments in Next Year    Mar 31, 2022 10:15 AM  LAB with [facility]  [facility] ([facility] ) [phone]   Apr 05, 2022  4:00 PM  (Arrive by 3:40 PM)  Provider Visit with Adelita Kennedy MD  [facility] ([facility] ) [phone]   Apr 07, 2022  1:20 PM  (Arrive by 1:00 PM)  Provider Visit with Declan Schmitt MD  [facility] ([facility] ) [phone]

## 2022-04-01 LAB
ANION GAP SERPL CALCULATED.3IONS-SCNC: 9 MMOL/L (ref 3–14)
BUN SERPL-MCNC: 17 MG/DL (ref 7–30)
CALCIUM SERPL-MCNC: 10.1 MG/DL (ref 8.5–10.1)
CHLORIDE BLD-SCNC: 107 MMOL/L (ref 94–109)
CHOLEST SERPL-MCNC: 204 MG/DL
CO2 SERPL-SCNC: 23 MMOL/L (ref 20–32)
CREAT SERPL-MCNC: 0.65 MG/DL (ref 0.52–1.04)
CREAT UR-MCNC: 111 MG/DL
FASTING STATUS PATIENT QL REPORTED: YES
GFR SERPL CREATININE-BSD FRML MDRD: 85 ML/MIN/1.73M2
GLUCOSE BLD-MCNC: 136 MG/DL (ref 70–99)
HDLC SERPL-MCNC: 62 MG/DL
LDLC SERPL CALC-MCNC: 112 MG/DL
MICROALBUMIN UR-MCNC: 177 MG/L
MICROALBUMIN/CREAT UR: 159.46 MG/G CR (ref 0–25)
NONHDLC SERPL-MCNC: 142 MG/DL
POTASSIUM BLD-SCNC: 3.9 MMOL/L (ref 3.4–5.3)
SODIUM SERPL-SCNC: 139 MMOL/L (ref 133–144)
TRIGL SERPL-MCNC: 148 MG/DL
TSH SERPL DL<=0.005 MIU/L-ACNC: 2.77 MU/L (ref 0.4–4)

## 2022-04-07 ENCOUNTER — OFFICE VISIT (OUTPATIENT)
Dept: INTERNAL MEDICINE | Facility: CLINIC | Age: 87
End: 2022-04-07
Payer: COMMERCIAL

## 2022-04-07 VITALS
HEIGHT: 65 IN | WEIGHT: 176 LBS | BODY MASS INDEX: 29.32 KG/M2 | SYSTOLIC BLOOD PRESSURE: 138 MMHG | RESPIRATION RATE: 14 BRPM | DIASTOLIC BLOOD PRESSURE: 70 MMHG | TEMPERATURE: 98.2 F | HEART RATE: 100 BPM | OXYGEN SATURATION: 98 %

## 2022-04-07 DIAGNOSIS — E78.5 HYPERLIPIDEMIA LDL GOAL <100: ICD-10-CM

## 2022-04-07 DIAGNOSIS — F41.9 ANXIETY: ICD-10-CM

## 2022-04-07 DIAGNOSIS — E11.9 TYPE 2 DIABETES MELLITUS WITHOUT COMPLICATION, WITHOUT LONG-TERM CURRENT USE OF INSULIN (H): ICD-10-CM

## 2022-04-07 DIAGNOSIS — E11.9 TYPE 2 DIABETES MELLITUS WITHOUT COMPLICATION, WITHOUT LONG-TERM CURRENT USE OF INSULIN (H): Primary | ICD-10-CM

## 2022-04-07 DIAGNOSIS — Z91.030 BEE ALLERGY STATUS: ICD-10-CM

## 2022-04-07 DIAGNOSIS — R06.09 DYSPNEA ON EXERTION: ICD-10-CM

## 2022-04-07 DIAGNOSIS — J45.20 MILD INTERMITTENT ASTHMA WITHOUT COMPLICATION: ICD-10-CM

## 2022-04-07 DIAGNOSIS — M85.80 OSTEOPENIA, UNSPECIFIED LOCATION: ICD-10-CM

## 2022-04-07 DIAGNOSIS — H35.3213 EXUDATIVE AGE-RELATED MACULAR DEGENERATION OF RIGHT EYE WITH INACTIVE SCAR (H): ICD-10-CM

## 2022-04-07 DIAGNOSIS — I10 BENIGN ESSENTIAL HYPERTENSION: ICD-10-CM

## 2022-04-07 DIAGNOSIS — G47.00 INSOMNIA, UNSPECIFIED TYPE: ICD-10-CM

## 2022-04-07 DIAGNOSIS — F32.4 MAJOR DEPRESSIVE DISORDER WITH SINGLE EPISODE, IN PARTIAL REMISSION (H): ICD-10-CM

## 2022-04-07 DIAGNOSIS — E03.9 ACQUIRED HYPOTHYROIDISM: ICD-10-CM

## 2022-04-07 PROCEDURE — 99207 PR FOOT EXAM NO CHARGE: CPT | Performed by: INTERNAL MEDICINE

## 2022-04-07 PROCEDURE — 99214 OFFICE O/P EST MOD 30 MIN: CPT | Performed by: INTERNAL MEDICINE

## 2022-04-07 RX ORDER — PRAVASTATIN SODIUM 80 MG/1
80 TABLET ORAL DAILY
Qty: 90 TABLET | Refills: 3 | Status: SHIPPED | OUTPATIENT
Start: 2022-04-07 | End: 2023-01-31

## 2022-04-07 RX ORDER — EPINEPHRINE 0.3 MG/.3ML
0.3 INJECTION SUBCUTANEOUS
Qty: 2 EACH | Refills: 5 | Status: SHIPPED | OUTPATIENT
Start: 2022-04-07

## 2022-04-07 RX ORDER — LEVOTHYROXINE SODIUM 75 UG/1
75 TABLET ORAL EVERY MORNING
Qty: 90 TABLET | Refills: 3 | Status: SHIPPED | OUTPATIENT
Start: 2022-04-07 | End: 2023-01-31

## 2022-04-07 RX ORDER — AMLODIPINE BESYLATE 5 MG/1
5 TABLET ORAL DAILY
Qty: 90 TABLET | Refills: 3 | Status: SHIPPED | OUTPATIENT
Start: 2022-04-07 | End: 2023-02-27

## 2022-04-07 RX ORDER — RALOXIFENE HYDROCHLORIDE 60 MG/1
1 TABLET, FILM COATED ORAL DAILY
Qty: 90 TABLET | Refills: 3 | Status: SHIPPED | OUTPATIENT
Start: 2022-04-07 | End: 2023-04-19

## 2022-04-07 RX ORDER — LEVALBUTEROL TARTRATE 45 UG/1
2 AEROSOL, METERED ORAL EVERY 4 HOURS PRN
Qty: 15 G | Refills: 3 | Status: SHIPPED | OUTPATIENT
Start: 2022-04-07

## 2022-04-07 RX ORDER — TRAZODONE HYDROCHLORIDE 150 MG/1
225 TABLET ORAL AT BEDTIME
Qty: 135 TABLET | Refills: 1 | Status: SHIPPED | OUTPATIENT
Start: 2022-04-07 | End: 2022-09-20

## 2022-04-07 RX ORDER — POTASSIUM CHLORIDE 1500 MG/1
TABLET, EXTENDED RELEASE ORAL
Qty: 180 TABLET | Refills: 3 | Status: SHIPPED | OUTPATIENT
Start: 2022-04-07 | End: 2023-04-19

## 2022-04-07 RX ORDER — GLIPIZIDE 2.5 MG/1
2.5 TABLET, EXTENDED RELEASE ORAL DAILY
Qty: 90 TABLET | Refills: 1 | Status: SHIPPED | OUTPATIENT
Start: 2022-04-07 | End: 2022-09-13

## 2022-04-07 RX ORDER — SERTRALINE HYDROCHLORIDE 100 MG/1
100 TABLET, FILM COATED ORAL DAILY
Qty: 90 TABLET | Refills: 3 | Status: SHIPPED | OUTPATIENT
Start: 2022-04-07 | End: 2023-01-31

## 2022-04-07 NOTE — PATIENT INSTRUCTIONS
We will replace glimepiride with a comparable dose of glipizide, taken once a day.     You can try taking 1-1/2 tablets of the Trazodone to see if it helps you to sleep better. We could consider raising your dose of sertraline in the future if you feel anxious and/or aren't sleeping well.     Provided a Xopenex inhaler prescription to see if this causes less side effects than albuterol.     For the shortness of breath, consider seeing Janel Lanier at [facility].     Refilled all other meds at the same dose.     See me or Dr Kennedy in six months, sooner if problems.

## 2022-04-07 NOTE — TELEPHONE ENCOUNTER
Patient's daughter calling. Patient just saw primary care provider today and a prescription for a glucometer e-scribed.    Patient also needs test strips.    This RN sent in an prescription for test strips.  Daughter informed.

## 2022-04-07 NOTE — PROGRESS NOTES
1318--1404    Face to face time with patient and daughter: 35 minutes (1318---1353)    Assessment & Plan   (E11.9) Type 2 diabetes mellitus without complication, without long-term current use of insulin (H)  (primary encounter diagnosis)  Comment: A1c at target. Switch from glimepiride to glipizide due to formulary changes. Otherwise continue current measures.   Plan: blood glucose monitoring (NO BRAND SPECIFIED)         meter device kit, glipiZIDE (GLUCOTROL XL) 2.5         MG 24 hr tablet, FOOT EXAM          (E78.5) Hyperlipidemia LDL goal <100  Comment: LDL at target. Continue current meds.   Plan: pravastatin (PRAVACHOL) 80 MG tablet          (I10) Benign essential hypertension  Comment: BP at target. Continue current meds.   Plan: amLODIPine (NORVASC) 5 MG tablet, potassium         chloride ER (KLOR-CON) 20 MEQ CR tablet          (R06.00) Dyspnea on exertion  Comment: Offered Rx for Xopenex and pulmonary consult.   Plan: Adult Pulmonary Medicine Referral, levalbuterol        (XOPENEX HFA) 45 MCG/ACT inhaler          (J45.20) Mild intermittent asthma without complication  Comment: Offered Rx for Xopenex and pulmonary consult.     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 Continue current meds.     (E03.9) Acquired hypothyroidism  Comment: Euthyroid. Continue current meds.   Plan: levothyroxine (SYNTHROID/LEVOTHROID) 75 MCG         tablet          (M85.80) Osteopenia, unspecified location  Comment: Refill Evista.   Plan: raloxifene (EVISTA) 60 MG tablet          (F41.9) Anxiety  Comment: Stable. Continue current meds.   Plan: sertraline (ZOLOFT) 100 MG tablet          (H35.3213) Exudative age-related macular degeneration of right eye with inactive scar (H)  Comment: F/u with ophthalmology as they advise.     (G47.00) Insomnia, unspecified type  Comment: Will cautiously raise dose of Trazodone as ordered.   Plan: traZODone (DESYREL) 150 MG tablet           (Z91.030) Bee allergy status  Comment: Refilled epipen.   Plan: EPINEPHrine (ANY BX GENERIC EQUIV) 0.3 MG/0.3ML        injection 2-pack             Patient Instructions     We will replace glimepiride with a comparable dose of glipizide, taken once a day.     You can try taking 1-1/2 tablets of the Trazodone to see if it helps you to sleep better. We could consider raising your dose of sertraline in the future if you feel anxious and/or aren't sleeping well.     Provided a Xopenex inhaler prescription to see if this causes less side effects than albuterol.     For the shortness of breath, consider seeing Janel Lanier at [facility].     Refilled all other meds at the same dose.     See me or Dr Kennedy in six months, sooner if problems.           Return in about 6 months (around 10/7/2022).    Declan Schmitt MD,   [facility] Guido is a 87 year old who presents for the following health issues  accompanied by her daughter, Bessy.    HPI     Diabetes Follow-up    How often are you checking your blood sugar? A few times a month  What time of day are you checking your blood sugars (select all that apply)?  Before meals  Have you had any blood sugars above 200?  No  Have you had any blood sugars below 70?  No    What symptoms do you notice when your blood sugar is low?  None    What concerns do you have today about your diabetes? None     Do you have any of these symptoms? (Select all that apply)  Numbness in feet      BP Readings from Last 2 Encounters:   04/07/22 138/70   03/14/22 (!) 142/82     Hemoglobin A1C POCT (%)   Date Value   01/11/2021 6.1 (H)   07/07/2020 6.7 (H)     Hemoglobin A1C (%)   Date Value   03/31/2022 6.0 (H)   07/26/2021 5.9 (H)     LDL Cholesterol Calculated (mg/dL)   Date Value   03/31/2022 112 (H)   01/11/2021 104 (H)   09/20/2019 111 (H)     Hyperlipidemia Follow-Up      Are you regularly taking any medication or supplement to lower your  cholesterol?   Yes- Pravastatin    Are you having muscle aches or other side effects that you think could be caused by your cholesterol lowering medication?  No    Hypertension Follow-up      Do you check your blood pressure regularly outside of the clinic? Yes     Are you following a low salt diet? Yes    Are your blood pressures ever more than 140 on the top number (systolic) OR more   than 90 on the bottom number (diastolic), for example 140/90? Yes      How many servings of fruits and vegetables do you eat daily?  0-1    On average, how many sweetened beverages do you drink each day (Examples: soda, juice, sweet tea, etc.  Do NOT count diet or artificially sweetened beverages)?   Occasional sweet tea    How many days per week do you exercise enough to make your heart beat faster? 3 or less    How many minutes a day do you exercise enough to make your heart beat faster? 9 or less    How many days per week do you miss taking your medication? 0      The patient's daughter Bessy is present and assists in providing history.  Bessy notes that her mother appears to be working a bit harder to breathe at times, at rest or with activity.  She describes her mother exhaling at times with pursed lips sound audible breathing.  The patient herself does note dyspnea with activity but none at rest.  We did discuss an option of a future pulmonary consultation if she is interested.  She is offered a prescription for Xopenex, as she has noted palpitations in the past with episodic albuterol use.    We reviewed recent labs, showing favorable results for diabetes and LDL cholesterol.  Her blood pressure appears to be satisfactorily controlled.  Her insurance has asked that her glimepiride be changed to an alternative sulfonylurea, and she is switched to glipizide for this reason.    She has chronic insomnia and has found previous dose of trazodone at 150 mg not to help her to sustain sleep throughout the night.  We did discuss  "cautiously trying to increase her dose to 1-1/2 tablets or 225 mg at bedtime as a maximal dose.  She does feel as though anxiety and depressive symptoms are satisfactorily controlled overall on current dose of sertraline.    Past medical, family and social histories as well as medications reviewed and updated as needed.    Review of Systems   No dyspnea at rest or cough. No chest discomfort, dizziness or palpitations. No diarrhea, abdominal pain or rectal bleeding.   No acute problems with vision or speech, lateralizing weakness or paresthesias.    ROS: as above or negative for Respiratory, CV, GI, endocrine, psychiatric, neuro systems.       Objective    Vitals: /70   Pulse 100   Temp 98.2  F (36.8  C) (Tympanic)   Resp 14   Ht 1.651 m (5' 5\")   Wt 79.8 kg (176 lb)   LMP  (LMP Unknown)   SpO2 98%   Breastfeeding No   BMI 29.29 kg/m    BMI= Body mass index is 29.29 kg/m .     Physical Exam   GENERAL: healthy, alert and no distress  RESP: lungs clear to auscultation - no rales, rhonchi or wheezes  CV: regular rate and rhythm, normal S1 S2, no S3 or S4, no murmur, click or rub, no peripheral edema and peripheral pulses strong  MS: no gross musculoskeletal defects noted, no edema  NEURO: Normal strength and tone, mentation intact and speech normal  PSYCH: mentation appears normal, affect normal/bright  Diabetic foot exam: normal DP and PT pulses, no trophic changes or ulcerative lesions and normal sensory exam          "

## 2022-07-26 DIAGNOSIS — E11.9 TYPE 2 DIABETES MELLITUS WITHOUT COMPLICATION, WITHOUT LONG-TERM CURRENT USE OF INSULIN (H): ICD-10-CM

## 2022-07-28 RX ORDER — GLIPIZIDE 2.5 MG/1
TABLET, EXTENDED RELEASE ORAL
Qty: 90 TABLET | Refills: 1 | OUTPATIENT
Start: 2022-07-28

## 2022-08-04 ENCOUNTER — TELEPHONE (OUTPATIENT)
Dept: WOUND CARE | Facility: CLINIC | Age: 87
End: 2022-08-04

## 2022-08-04 ENCOUNTER — OFFICE VISIT (OUTPATIENT)
Dept: OBGYN | Facility: CLINIC | Age: 87
End: 2022-08-04
Payer: COMMERCIAL

## 2022-08-04 VITALS
HEIGHT: 65 IN | DIASTOLIC BLOOD PRESSURE: 60 MMHG | SYSTOLIC BLOOD PRESSURE: 120 MMHG | WEIGHT: 172 LBS | BODY MASS INDEX: 28.66 KG/M2

## 2022-08-04 DIAGNOSIS — L89.159 PRESSURE INJURY OF SKIN OF SACRAL REGION, UNSPECIFIED INJURY STAGE: Primary | ICD-10-CM

## 2022-08-04 PROCEDURE — 99214 OFFICE O/P EST MOD 30 MIN: CPT | Performed by: OBSTETRICS & GYNECOLOGY

## 2022-08-04 NOTE — PROGRESS NOTES
"  SUBJECTIVE:                                                     Norma F Alpers is a 87 year old female  who presents today for sore area on the buttocks, in the \"tailbone area\". This has been present for \"a long time\", and when she was hospitalized for diverticulitis, it was treated with a dressing that she says helped a lot. At home, she hasn't been able to get comfortable, sits a lot and this bothers it. She sits on a pillow. Denies fevers or chills, has noticed a little bleeding from time to time. Is not wearing a pad for incontinence because she wears depends now, as this seem to irritate the area less.    Her daughter accompanies her today.    After review of her chart, it seems likely that she was treated in the hospital with Mepliex sacral dressing.      Problem list and histories reviewed & adjusted, as indicated.  Additional history: as documented.    Patient Active Problem List   Diagnosis     Advanced directives, counseling/discussion     Benign essential hypertension     Hyperlipidemia LDL goal <100     GERD (gastroesophageal reflux disease)     Aphasic stroke     Osteopenia     Acquired hypothyroidism     Anxiety     Thyroid nodule     Type 2 diabetes mellitus without complication, without long-term current use of insulin (H)     Irritable bowel syndrome with both constipation and diarrhea     Major depressive disorder with single episode, in partial remission (H)     Exudative age-related macular degeneration of right eye with inactive scar (H)     Sigmoid diverticulitis     Past Surgical History:   Procedure Laterality Date     APPENDECTOMY      Ruptured --peritonitis     COCCYGECTOMY  2011    Zuleyka Vernell     HERNIA REPAIR      Abdomen     HYSTERECTOMY, PAP NO LONGER INDICATED       RELEASE TRIGGER FINGER  2014    Procedure: RELEASE TRIGGER FINGER;  Release Trigger Thumb ;  Surgeon: Gurpreet Mast MD;  Location:  OR     [facility] NONSPECIFIC PROCEDURE      Left foot surgery    "   Social History     Tobacco Use     Smoking status: Former Smoker     Packs/day: 0.00     Quit date: 3/27/1975     Years since quittin.3     Smokeless tobacco: Never Used   Substance Use Topics     Alcohol use: Yes     Comment: occasionally      Problem (# of Occurrences) Relation (Name,Age of Onset)    Alcohol/Drug (1) Father:  age 80, stroke    C.A.D. (1) Brother:  age 46    Cardiovascular (1) Mother:  age 92, CHF, DM    Diabetes (2) Brother:  age 30, DM, Sister: Born 1932, also HTN, lipids            acetaminophen (TYLENOL) 500 MG tablet, Take 500 mg by mouth every 6 hours as needed for mild pain  albuterol (PROAIR HFA/PROVENTIL HFA/VENTOLIN HFA) 108 (90 Base) MCG/ACT inhaler, Inhale 2 puffs into the lungs every 6 hours  amLODIPine (NORVASC) 5 MG tablet, Take 1 tablet (5 mg) by mouth daily  aspirin 325 MG tablet, Take 325 mg by mouth daily   blood glucose (NO BRAND SPECIFIED) test strip, Use to test blood sugar 1 times daily or as directed.  blood glucose monitoring (ACCU-CHEK SMARTVIEW) test strip, Use to test blood sugar 2-3 times daily or as directed.  blood glucose monitoring (NO BRAND SPECIFIED) meter device kit, Use to test blood sugar once time daily  Calcium Carbonate (CALCIUM 600 PO), Take 1 tablet by mouth daily 2 times daily  cetirizine (ZYRTEC) 10 MG tablet, Take 10 mg by mouth daily as needed for allergies  EPINEPHrine (ANY BX GENERIC EQUIV) 0.3 MG/0.3ML injection 2-pack, Inject 0.3 mLs (0.3 mg) into the muscle once as needed for anaphylaxis  glipiZIDE (GLUCOTROL XL) 2.5 MG 24 hr tablet, Take 1 tablet (2.5 mg) by mouth daily  glucosamine-chondroitinoitin 750-600 MG TABS, Take 1 tablet by mouth 2 times daily.    hydrochlorothiazide (HYDRODIURIL) 25 MG tablet, Take 12.5 mg by mouth daily as needed  levalbuterol (XOPENEX HFA) 45 MCG/ACT inhaler, Inhale 2 puffs into the lungs every 4 hours as needed for shortness of breath / dyspnea or wheezing  levothyroxine (SYNTHROID/LEVOTHROID)  75 MCG tablet, Take 1 tablet (75 mcg) by mouth every morning  Misc Natural Products (NEURIVA PO), Take by mouth nightly as needed  Multiple Vitamins-Minerals (PRESERVISION AREDS 2 PO), Take 1 tablet by mouth daily  omega-3 fatty acids 1200 MG capsule, Take 1 capsule by mouth daily.  potassium chloride ER (KLOR-CON) 20 MEQ CR tablet, TAKE 1 TABLET BY MOUTH 2 TIMES DAILY  pravastatin (PRAVACHOL) 80 MG tablet, Take 1 tablet (80 mg) by mouth daily  raloxifene (EVISTA) 60 MG tablet, Take 1 tablet (60 mg) by mouth daily  sertraline (ZOLOFT) 100 MG tablet, Take 1 tablet (100 mg) by mouth daily  thin (NO BRAND SPECIFIED) lancets, 1 each daily  traZODone (DESYREL) 150 MG tablet, Take 1.5 tablets (225 mg) by mouth At Bedtime  TURMERIC PO, Take 1 tablet by mouth daily   vitamin B complex with vitamin C (STRESS TAB) tablet, Take 1 tablet by mouth daily  Zinc Sulfate (ZINC-220 PO), Take 1 tablet by mouth daily as needed    No current facility-administered medications on file prior to visit.    Allergies   Allergen Reactions     Bee Venom      Throat closes     No Clinical Screening - See Comments Shortness Of Breath     SOB at dentist office - thinks it was novacaine     Hydralazine      palpitations     Lisinopril      angioedema     Penicillin G Swelling     Seasonal Allergies        ROS:  Negative other than as noted in HPI.    OBJECTIVE:     Exam:  Constitutional:  Appearance: Well nourished, well developed alert, in no acute distress  Skin:General Inspection:  No rashes present. On the sacrum, there is a well delineated erythema with hypertrophy and scaling, along with tenderness at the upper gluteal crease, consistent with pressure injury. This does not extend to the vulva or perineum.      In-Clinic Test Results:  No results found for this or any previous visit (from the past 24 hour(s)).    ASSESSMENT/PLAN:                                                        ICD-10-CM    1. Pressure injury of skin of sacral region,  unspecified injury stage  L89.159 Wound Care Referral         Discussed that this is really not my area of expertise, but they were hoping for a prescription for the dressing today. Instead, I think she should really be evaluated and treated in wound clinic, since it is imperative that we address her skin in this area to heal but also prevent pressure ulcers from forming. Discussed this in detail, they agree wound referral. Number for wound clinic in [place] given; encouraged to follow up for concerns.    Gita Espinoza MD  [facility]

## 2022-08-05 NOTE — TELEPHONE ENCOUNTER
Returned call to patient's daughter, Bessy;  Details as follows:  Patient has urinary incontinence and pressure relief challenges due to limited mobility;  Alternating use of pads and full coverage briefs to manage urine;  Using a variety of OTC topicals to sacrum to treat irritation;  Attempts to off load in recliner are limited but a focus as she is using a variety of cushions;  Bessy was instructed not to put anything on the area per PCP visit 8/4/22 and referred to [facility];  There is occasional bleeding from area;  PCP notes erythema, hypertrophy and scaling;  Writer encouraged attempts to offload, pressure relief through time off buttocks, protect from urine/stool;   Scheduled with BEE Anderson PA-C on 8/10/22 as new patient with follow up scheduled 3 weeks later on 8/31/22;  Bessy will accompany;  Advised her to bring in the assorted pastes and seating cushions that patient uses to determine appropriateness;  No additional concerns.

## 2022-08-10 ENCOUNTER — HOSPITAL ENCOUNTER (OUTPATIENT)
Dept: WOUND CARE | Facility: CLINIC | Age: 87
Discharge: HOME | End: 2022-08-10
Attending: PHYSICIAN ASSISTANT | Admitting: PHYSICIAN ASSISTANT
Payer: COMMERCIAL

## 2022-08-10 VITALS — TEMPERATURE: 97.8 F | SYSTOLIC BLOOD PRESSURE: 160 MMHG | HEART RATE: 94 BPM | DIASTOLIC BLOOD PRESSURE: 94 MMHG

## 2022-08-10 DIAGNOSIS — L30.9 DERMATITIS: ICD-10-CM

## 2022-08-10 PROCEDURE — 99204 OFFICE O/P NEW MOD 45 MIN: CPT | Performed by: PHYSICIAN ASSISTANT

## 2022-08-10 PROCEDURE — G0463 HOSPITAL OUTPT CLINIC VISIT: HCPCS

## 2022-08-10 RX ORDER — TRIAMCINOLONE ACETONIDE 1 MG/G
OINTMENT TOPICAL 2 TIMES DAILY
Qty: 80 G | Refills: 1 | Status: SHIPPED | OUTPATIENT
Start: 2022-08-10 | End: 2023-02-07

## 2022-08-10 RX ORDER — VALACYCLOVIR HYDROCHLORIDE 500 MG/1
TABLET, FILM COATED ORAL
COMMUNITY
Start: 2021-11-09

## 2022-08-10 NOTE — PROGRESS NOTES
"[facility]    ASSESSMENT:   1. Incontinence associated dermatitis    PLAN/DISCUSSION:   1. Cleanse with Vernell or Noleo, apply TAC 0.1% oint and antifungal oint (such as criticaid AF or Triple PAste AF) twice daily until rash resolved  2. Attempt to air out area, possibly sleep on chux at night   3. See bottom of note for detailed wound care and patient instructions    HISTORY OF PRESENT ILLNESS:   Norma F Alpers is a 87 year old female with urinary incontinence and IBS with diarrhea who presents today for rash and soreness over her sacrum. She has been dealing with this for years. Has tried several different ointments - lidocaine/nifedinpine, zinc oxide and essential oils. Noted that a \"gel pad\" in the hospital was helpful. She washes after BM with hot water and wash cloth. Sometimes uses cerave.     VITALS: BP (!) 160/94 (BP Location: Left arm)   Pulse 94   Temp 97.8  F (36.6  C)   LMP  (LMP Unknown)      PHYSICAL EXAM:  GENERAL: Patient is alert and oriented and in no acute distress  INTEGUMENTARY: large erythematous plaque over sacrum and in gluteal cleft      MDM: 45-59 minutes were spent on the date of the visit reviewing previous chart notes, evaluating patient and developing the treatment plan, this excludes any time spent on procedures.     PATIENT INSTRUCTIONS      Further instructions from your care team       Norma F Alpers      10/29/1934    Wound care recommendations gluteus/sacrum:  Clean the area at least twice daily with Vernell or Noleo cleanser using a dry pad or dry washcloth to apply the cleanser  Then apply triamcinolone cream to skin   Then apply critic aid antifungal ointment  Apply twice daily    Continue until the rash resolves    Continue to use the Vernell or Noleo cleanser on your gluteus lifelong and use triple paste for prevention       Joyce Anderson PA-C August 10, 2022    Call us at [phone] if you have any questions about your wounds, have redness or " swelling around your wound, have a fever of 101 or greater or if you have any other problems or concerns. We answer the phone Monday through Friday 8 am to 4 pm, please leave a message as we check the voicemail frequently throughout the day.     If you had a positive experience please indicate that on your patient satisfaction survey form that [facility] will be sending you.    It was a pleasure meeting with you today.  Thank you for allowing me and my team the privilege of caring for you today.  YOU are the reason we are here, and I truly hope we provided you with the excellent service you deserve.  Please let us know if there is anything else we can do for you so that we can be sure you are leaving completely satisfied with your care experience.      If you have any billing related questions please call the [facility] Business office at [phone]. The clinic staff does not handle billing related matters.    If you are scheduled have a follow up appointment, you will receive a reminder call the day before your visit. If you are unable to keep that appointment, please call the clinic to cancel or reschedule.              Electronically signed by Joyce Anderson PA-C on August 10, 2022

## 2022-08-10 NOTE — DISCHARGE INSTRUCTIONS
Norma F Alpers      10/29/1934    Wound care recommendations gluteus/sacrum:  Clean the area at least twice daily with Vernell or Noleo cleanser using a dry pad or dry washcloth to apply the cleanser  Then apply triamcinolone cream to skin   Then apply critic aid antifungal ointment  Apply twice daily    Continue until the rash resolves    Continue to use the Vernell or Noleo cleanser on your gluteus lifelong and use triple paste for prevention       Joyce Anderson PA-C August 10, 2022    Call us at [phone] if you have any questions about your wounds, have redness or swelling around your wound, have a fever of 101 or greater or if you have any other problems or concerns. We answer the phone Monday through Friday 8 am to 4 pm, please leave a message as we check the voicemail frequently throughout the day.     If you had a positive experience please indicate that on your patient satisfaction survey form that [facility] will be sending you.    It was a pleasure meeting with you today.  Thank you for allowing me and my team the privilege of caring for you today.  YOU are the reason we are here, and I truly hope we provided you with the excellent service you deserve.  Please let us know if there is anything else we can do for you so that we can be sure you are leaving completely satisfied with your care experience.      If you have any billing related questions please call the [facility] Business office at [phone]. The clinic staff does not handle billing related matters.    If you are scheduled have a follow up appointment, you will receive a reminder call the day before your visit. If you are unable to keep that appointment, please call the clinic to cancel or reschedule.

## 2022-08-10 NOTE — PROGRESS NOTES
Patient arrived for wound care visit. Certified Wound Care Nurse time spent evaluating patient record, completed a full evaluation and documented wound(s) & maryse-wound skin; provided recommendation based on treatment plan. Applied dressing, reviewed discharge instructions, patient education, and discussed plan of care with appropriate medical team staff members and patient and/or family members.

## 2022-08-31 ENCOUNTER — HOSPITAL ENCOUNTER (OUTPATIENT)
Dept: WOUND CARE | Facility: CLINIC | Age: 87
Discharge: HOME | End: 2022-08-31
Attending: PHYSICIAN ASSISTANT | Admitting: PHYSICIAN ASSISTANT
Payer: COMMERCIAL

## 2022-08-31 DIAGNOSIS — L30.9 DERMATITIS: Primary | ICD-10-CM

## 2022-08-31 DIAGNOSIS — L89.159 PRESSURE INJURY OF SKIN OF SACRAL REGION, UNSPECIFIED INJURY STAGE: ICD-10-CM

## 2022-08-31 PROCEDURE — G0463 HOSPITAL OUTPT CLINIC VISIT: HCPCS

## 2022-08-31 PROCEDURE — 99213 OFFICE O/P EST LOW 20 MIN: CPT | Performed by: PHYSICIAN ASSISTANT

## 2022-08-31 NOTE — DISCHARGE INSTRUCTIONS
Norma F Alpers      10/29/1934    Wound care recommendations gluteus/sacrum: twice a day and as needed  Stop using triamcinolone cream to skin- this makes skin more fragile and use only as needed  Apply critic aid antifungal ointment for fungal rash and satellite lesions only    Clean the area at least twice daily with Vernell or Noleo cleanser using a dry pad or dry washcloth to apply the cleanser  Continue to use the Vernell or Noleo cleanser and Triple Paste on your buttocks lifelong   You MUST off load the buttocks, side to side in chair, use pillows, get up out of chair every hour!!  Discoloration will fade over time     Joyce Anderson PA-C August 31, 2022    Call us at [phone] if you have any questions about your wounds, have redness or swelling around your wound, have a fever of 101 or greater or if you have any other problems or concerns. We answer the phone Monday through Friday 8 am to 4 pm, please leave a message as we check the voicemail frequently throughout the day.     If you had a positive experience please indicate that on your patient satisfaction survey form that [facility] will be sending you.    It was a pleasure meeting with you today.  Thank you for allowing me and my team the privilege of caring for you today.  YOU are the reason we are here, and I truly hope we provided you with the excellent service you deserve.  Please let us know if there is anything else we can do for you so that we can be sure you are leaving completely satisfied with your care experience.      If you have any billing related questions please call the [facility] Business office at [phone]. The clinic staff does not handle billing related matters.    If you are scheduled to have a follow up appointment, you will receive a reminder call the day before your visit. On the appointment day please arrive 15 minutes prior to your appointment time. If you are unable to keep that appointment, please call the clinic  to cancel or reschedule. If you are more than 10 minutes late or greater for your appointment, the clinic policy is that you may be asked to reschedule.

## 2022-08-31 NOTE — PROGRESS NOTES
"[facility]    ASSESSMENT:   1. Incontinence associated dermatitis    PLAN/DISCUSSION:   1. Cleanse with Vernell or Noleo, apply barrier cream such as Triple Paste. If rash returns apply TAC 0.1% oint and antifungal oint (such as criticaid AF or Triple PAste AF) twice daily until rash resolved  2. Attempt to air out area, possibly sleep on chux at night   3. Reposition frequently, avoid sleeping in recliner  4. See bottom of note for detailed wound care and patient instructions    HISTORY OF PRESENT ILLNESS:   Norma F Alpers is a 87 year old female with urinary incontinence and IBS with diarrhea who presents today for rash and soreness over her sacrum. She has been dealing with this for years. Has tried several different ointments - lidocaine/nifedinpine, zinc oxide and essential oils. Noted that a \"gel pad\" in the hospital was helpful. She washes after BM with hot water and wash cloth. Sometimes uses cerave.     I visited with her several weeks ago and started her on a regimen of TAC 0.1% and antifungal ointment. The scaly skin changes and pain have resolved. She still has some discoloration.     VITALS: LMP  (LMP Unknown)      PHYSICAL EXAM:  GENERAL: Patient is alert and oriented and in no acute distress  INTEGUMENTARY: skin is smooth and intact but has purplish discoloration, likely both mild bruising and post inflammatory hyperpigmentation      MDM: 29 minutes was spent on the day of visit reviewing previous chart notes, assessing the patient and developing the plan of care, this excludes time spent on any procedures.     PATIENT INSTRUCTIONS      Further instructions from your care team       Norma F Alpers      10/29/1934    Wound care recommendations gluteus/sacrum: twice a day and as needed  Stop using triamcinolone cream to skin- this makes skin more fragile and use only as needed  Apply critic aid antifungal ointment for fungal rash and satellite lesions only    Clean the area at least " twice daily with Vernell or Noleo cleanser using a dry pad or dry washcloth to apply the cleanser  Continue to use the Vernell or Noleo cleanser and Triple Paste on your buttocks lifelong   You MUST off load the buttocks, side to side in chair, use pillows, get up out of chair every hour!!  Discoloration will fade over time     Joyce Anderson PA-C August 31, 2022    Call us at [phone] if you have any questions about your wounds, have redness or swelling around your wound, have a fever of 101 or greater or if you have any other problems or concerns. We answer the phone Monday through Friday 8 am to 4 pm, please leave a message as we check the voicemail frequently throughout the day.     If you had a positive experience please indicate that on your patient satisfaction survey form that [facility] will be sending you.    It was a pleasure meeting with you today.  Thank you for allowing me and my team the privilege of caring for you today.  YOU are the reason we are here, and I truly hope we provided you with the excellent service you deserve.  Please let us know if there is anything else we can do for you so that we can be sure you are leaving completely satisfied with your care experience.      If you have any billing related questions please call the [facility] Business office at [phone]. The clinic staff does not handle billing related matters.    If you are scheduled to have a follow up appointment, you will receive a reminder call the day before your visit. On the appointment day please arrive 15 minutes prior to your appointment time. If you are unable to keep that appointment, please call the clinic to cancel or reschedule. If you are more than 10 minutes late or greater for your appointment, the clinic policy is that you may be asked to reschedule.

## 2022-10-11 ENCOUNTER — OFFICE VISIT (OUTPATIENT)
Dept: INTERNAL MEDICINE | Facility: CLINIC | Age: 87
End: 2022-10-11
Payer: COMMERCIAL

## 2022-10-11 VITALS
DIASTOLIC BLOOD PRESSURE: 70 MMHG | OXYGEN SATURATION: 95 % | HEART RATE: 95 BPM | WEIGHT: 174 LBS | SYSTOLIC BLOOD PRESSURE: 126 MMHG | TEMPERATURE: 97.1 F | BODY MASS INDEX: 28.99 KG/M2 | HEIGHT: 65 IN | RESPIRATION RATE: 14 BRPM

## 2022-10-11 DIAGNOSIS — J45.30 MILD PERSISTENT ASTHMA WITHOUT COMPLICATION: ICD-10-CM

## 2022-10-11 DIAGNOSIS — E11.9 TYPE 2 DIABETES MELLITUS WITHOUT COMPLICATION, WITHOUT LONG-TERM CURRENT USE OF INSULIN (H): Primary | ICD-10-CM

## 2022-10-11 DIAGNOSIS — E78.5 HYPERLIPIDEMIA LDL GOAL <100: ICD-10-CM

## 2022-10-11 DIAGNOSIS — H91.93 BILATERAL HEARING LOSS, UNSPECIFIED HEARING LOSS TYPE: ICD-10-CM

## 2022-10-11 DIAGNOSIS — H93.12 TINNITUS, LEFT: ICD-10-CM

## 2022-10-11 DIAGNOSIS — L30.9 DERMATITIS: ICD-10-CM

## 2022-10-11 LAB — HBA1C MFR BLD: 5.8 % (ref 0–5.6)

## 2022-10-11 PROCEDURE — 80053 COMPREHEN METABOLIC PANEL: CPT | Performed by: INTERNAL MEDICINE

## 2022-10-11 PROCEDURE — 99214 OFFICE O/P EST MOD 30 MIN: CPT | Performed by: INTERNAL MEDICINE

## 2022-10-11 PROCEDURE — 83036 HEMOGLOBIN GLYCOSYLATED A1C: CPT | Performed by: INTERNAL MEDICINE

## 2022-10-11 PROCEDURE — 36415 COLL VENOUS BLD VENIPUNCTURE: CPT | Performed by: INTERNAL MEDICINE

## 2022-10-11 RX ORDER — FLUTICASONE PROPIONATE 110 UG/1
1-2 AEROSOL, METERED RESPIRATORY (INHALATION) 2 TIMES DAILY
Qty: 12 G | Refills: 11 | Status: SHIPPED | OUTPATIENT
Start: 2022-10-11

## 2022-10-11 ASSESSMENT — PATIENT HEALTH QUESTIONNAIRE - PHQ9
SUM OF ALL RESPONSES TO PHQ QUESTIONS 1-9: 19
10. IF YOU CHECKED OFF ANY PROBLEMS, HOW DIFFICULT HAVE THESE PROBLEMS MADE IT FOR YOU TO DO YOUR WORK, TAKE CARE OF THINGS AT HOME, OR GET ALONG WITH OTHER PEOPLE: SOMEWHAT DIFFICULT
SUM OF ALL RESPONSES TO PHQ QUESTIONS 1-9: 19

## 2022-10-11 ASSESSMENT — ANXIETY QUESTIONNAIRES
GAD7 TOTAL SCORE: 15
IF YOU CHECKED OFF ANY PROBLEMS ON THIS QUESTIONNAIRE, HOW DIFFICULT HAVE THESE PROBLEMS MADE IT FOR YOU TO DO YOUR WORK, TAKE CARE OF THINGS AT HOME, OR GET ALONG WITH OTHER PEOPLE: SOMEWHAT DIFFICULT
GAD7 TOTAL SCORE: 15
8. IF YOU CHECKED OFF ANY PROBLEMS, HOW DIFFICULT HAVE THESE MADE IT FOR YOU TO DO YOUR WORK, TAKE CARE OF THINGS AT HOME, OR GET ALONG WITH OTHER PEOPLE?: SOMEWHAT DIFFICULT
2. NOT BEING ABLE TO STOP OR CONTROL WORRYING: NEARLY EVERY DAY
6. BECOMING EASILY ANNOYED OR IRRITABLE: NEARLY EVERY DAY
7. FEELING AFRAID AS IF SOMETHING AWFUL MIGHT HAPPEN: MORE THAN HALF THE DAYS
5. BEING SO RESTLESS THAT IT IS HARD TO SIT STILL: NOT AT ALL
4. TROUBLE RELAXING: SEVERAL DAYS
1. FEELING NERVOUS, ANXIOUS, OR ON EDGE: NEARLY EVERY DAY
7. FEELING AFRAID AS IF SOMETHING AWFUL MIGHT HAPPEN: MORE THAN HALF THE DAYS
3. WORRYING TOO MUCH ABOUT DIFFERENT THINGS: NEARLY EVERY DAY
GAD7 TOTAL SCORE: 15

## 2022-10-11 ASSESSMENT — ASTHMA QUESTIONNAIRES
QUESTION_5 LAST FOUR WEEKS HOW WOULD YOU RATE YOUR ASTHMA CONTROL: POORLY CONTROLLED
QUESTION_3 LAST FOUR WEEKS HOW OFTEN DID YOUR ASTHMA SYMPTOMS (WHEEZING, COUGHING, SHORTNESS OF BREATH, CHEST TIGHTNESS OR PAIN) WAKE YOU UP AT NIGHT OR EARLIER THAN USUAL IN THE MORNING: NOT AT ALL
ACT_TOTALSCORE: 14
QUESTION_2 LAST FOUR WEEKS HOW OFTEN HAVE YOU HAD SHORTNESS OF BREATH: MORE THAN ONCE A DAY
ACT_TOTALSCORE: 14
QUESTION_1 LAST FOUR WEEKS HOW MUCH OF THE TIME DID YOUR ASTHMA KEEP YOU FROM GETTING AS MUCH DONE AT WORK, SCHOOL OR AT HOME: MOST OF THE TIME
QUESTION_4 LAST FOUR WEEKS HOW OFTEN HAVE YOU USED YOUR RESCUE INHALER OR NEBULIZER MEDICATION (SUCH AS ALBUTEROL): ONCE A WEEK OR LESS

## 2022-10-11 NOTE — PROGRESS NOTES
"Face to face time with patient and daughter: 29 minutes (1141---1210)   Time spent in documentation: >5 minutes    Assessment & Plan   (E11.9) Type 2 diabetes mellitus without complication, without long-term current use of insulin (H)  (primary encounter diagnosis)  Comment: A1c at target. Continue current non-Rx measures. Discontinue glipizide.     (E78.5) Hyperlipidemia LDL goal <100  Comment: Update lipids. Continue current meds.   Plan: Lipid panel reflex to direct LDL Non-fasting          (J45.30) Mild persistent asthma without complication  Comment: Begin routine use of fluticasone to see if breathing improves.   Plan: fluticasone (FLOVENT HFA) 110 MCG/ACT inhaler          (H93.12) Tinnitus, left  (H91.93) Bilateral hearing loss, unspecified hearing loss type  Comment: Urged ENT consult.   Plan: Adult ENT  Referral          (L30.9) Dermatitis  Comment: Continue chronic measures as ordered by Wound Clinic.          BMI:   Estimated body mass index is 28.96 kg/m  as calculated from the following:    Height as of this encounter: 1.651 m (5' 5\").    Weight as of this encounter: 78.9 kg (174 lb).       Depression Screening Follow Up    PHQ 10/11/2022   PHQ-9 Total Score 19   Q9: Thoughts of better off dead/self-harm past 2 weeks Several days   F/U: Thoughts of suicide or self-harm No   F/U: Safety concerns No       Patient Instructions   The shortness of breath is probably more from asthma. Your heart test looked excellent in 2020.  Try adding a twice a day inhaler fluticasone, 1-2 sprays twice day. Take twice a day every day. You can still use the Xopenex inhaler here and there as needed.     Consider seeing ENT about tinnitus and hearing.     Diabetes looks great. Stop taking the diabetes pill (glipizide).     See me back in April 2023 for an Annual Wellness Visit.       Return in about 6 months (around 4/11/2023) for Annual Wellness Visit.    Declan Schmitt MD,   [facility] " KENTON Guido is a 87 year old accompanied by her daughter, presenting for the following health issues:  Follow Up      History of Present Illness     Asthma:  She presents for follow up of asthma.  She has no cough, some wheezing, and some shortness of breath. She is using a relief medication a few times a month. She typically misses taking her controller medication 6 time(s) per week.Patient is aware of the following triggers: exercise or sports, gastric reflux, pollens and smoke. The patient has not had a visit to the Emergency Room, Urgent Care or Hospital due to asthma since the last clinic visit.     Back Pain:  She presents for follow up of back pain. Patient's back pain is a chronic problem.  Location of back pain:  Right lower back, left lower back, right middle of back, left middle of back, left shoulder, right buttock, left buttock, right hip and left side of waist  Description of back pain: dull ache, sharp and shooting  Back pain spreads: left shoulder and left side of neck    Since patient first noticed back pain, pain is: gradually worsening  Does back pain interfere with her job:  Yes      Mental Health Follow-up:  Patient presents to follow-up on Depression & Anxiety.Patient's depression since last visit has been:  Worse  The patient is having other symptoms associated with depression.  Patient's anxiety since last visit has been:  Worse  The patient is having other symptoms associated with anxiety.  Any significant life events: No  Patient is not feeling anxious or having panic attacks.  Patient has no concerns about alcohol or drug use.    Diabetes:   She presents for follow up of diabetes.  She is checking home blood glucose a few times a month. She checks blood glucose before meals.  Blood glucose is never over 200 and never under 70. She is aware of hypoglycemia symptoms including shakiness, dizziness, weakness, lethargy, confusion and other. She is concerned about other. She  "is having numbness in feet, burning in feet, redness, sores, or blisters on feet, excessive thirst and blurry vision. The patient has had a diabetic eye exam in the last 12 months.         Hypertension: She presents for follow up of hypertension.  She does not check blood pressure  regularly outside of the clinic. Outside blood pressures have been over 140/90. She follows a low salt diet.     Hypothyroidism:     Since last visit, patient describes the following symptoms::  Anxiety, Constipation, Depression, Dry skin, Fatigue, Loose stools and Tremors    Headaches:   Since the patient's last clinic visit, headaches are: no change  The patient is getting headaches:  Every other day  She is able to do normal daily activities when she has a migraine.  The patient is taking the following rescue/relief medications:  Ibuprofen (Advil, Motrin), Naproxyn (Aleve) and Tylenol   Patient states \"I get only a small amount of relief\" from the rescue/relief medications.   The patient is taking the following medications to prevent migraines:  No medications to prevent migraines  In the past 4 weeks, the patient has gone to an Urgent Care or Emergency Room 0 times times due to headaches.     Today's PHQ-9         PHQ-9 Total Score: 19    PHQ-9 Q9 Thoughts of better off dead/self-harm past 2 weeks :   Several days  Thoughts of suicide or self harm: (P) No  Self-harm Plan:     Self-harm Action:       Safety concerns for self or others: (P) No    How difficult have these problems made it for you to do your work, take care of things at home, or get along with other people: Somewhat difficult  Today's JENNY-7 Score: 15     She is discouraged by chronic decline in her vision due to macular degeneration. She lives in [facility]. She still has her car but doesn't drive it, allowing her daughter to drive her.     Reviewed today's A1c of 5.8. Checking glucoses infrequently. Today's glucose was 124 .  Daughter Bessy notes that her mother " "has occasional low glucoses.   The patient is encouraged to discontinue glipizide.     We agreed to update her LDL-Cholesterol today.   She continues on pravastatin.     BP appears satisfactorily controlled on current meds.     She notes chronic dyspnea with minimal activity.   Her cardiac workup was quite unremarkable in 2020.   She does have a history of asthma and uses Xopenex prn. We discussed potential benefits of starting a maintenance corticosteroid inhaler, and fluticasone is prescribed.     She notes left ear tinnitus and bilateral hearing loss, and is urged to seek an ENT clinic consult.     Daughter points out chronic gluteal rash followed by Wound clinic, actually slowly improving.      Past medical, family and social histories as well as medications reviewed and updated as needed.    Review of Systems   Occasional chest tightness, chronic, not exertional. No cough. Dyspnea as above. No dizziness. Occasional fleeting palpitations.   No amaurosis, diplopia, dysarthria or aphasia, lateralizing weakness or paresthesias.     ROS: as above or negative for Respiratory, CV, GI, derm, endocrine, neuro systems.       Objective    /70   Pulse 95   Temp 97.1  F (36.2  C) (Tympanic)   Resp 14   Ht 1.651 m (5' 5\")   Wt 78.9 kg (174 lb)   LMP  (LMP Unknown)   SpO2 95%   BMI 28.96 kg/m    Body mass index is 28.96 kg/m .     Physical Exam   GENERAL: healthy, alert and no distress  HENT: ear canals and TM's normal  NECK: no adenopathy, no asymmetry, masses, or scars and thyroid normal to palpation  RESP: mild expiratory phase prolongation bilaterally, no crackles.   CV: regular rate and rhythm, normal S1 S2, no S3 or S4, no murmur, click or rub, no peripheral edema and peripheral pulses strong  MS: no gross musculoskeletal defects noted, no edema  NEURO: Normal strength and tone, mentation intact and speech normal  PSYCH: mentation appears normal, affect normal/bright              "

## 2022-10-12 LAB
ALBUMIN SERPL-MCNC: 3.3 G/DL (ref 3.4–5)
ALP SERPL-CCNC: 92 U/L (ref 40–150)
ALT SERPL W P-5'-P-CCNC: 16 U/L (ref 0–50)
ANION GAP SERPL CALCULATED.3IONS-SCNC: 7 MMOL/L (ref 3–14)
AST SERPL W P-5'-P-CCNC: 15 U/L (ref 0–45)
BILIRUB SERPL-MCNC: 0.4 MG/DL (ref 0.2–1.3)
BUN SERPL-MCNC: 27 MG/DL (ref 7–30)
CALCIUM SERPL-MCNC: 9.7 MG/DL (ref 8.5–10.1)
CHLORIDE BLD-SCNC: 110 MMOL/L (ref 94–109)
CO2 SERPL-SCNC: 24 MMOL/L (ref 20–32)
CREAT SERPL-MCNC: 0.55 MG/DL (ref 0.52–1.04)
GFR SERPL CREATININE-BSD FRML MDRD: 88 ML/MIN/1.73M2
GLUCOSE BLD-MCNC: 110 MG/DL (ref 70–99)
POTASSIUM BLD-SCNC: 4 MMOL/L (ref 3.4–5.3)
PROT SERPL-MCNC: 7 G/DL (ref 6.8–8.8)
SODIUM SERPL-SCNC: 141 MMOL/L (ref 133–144)

## 2022-10-24 ENCOUNTER — TELEPHONE (OUTPATIENT)
Dept: WOUND CARE | Facility: CLINIC | Age: 87
End: 2022-10-24

## 2022-10-24 ENCOUNTER — TELEPHONE (OUTPATIENT)
Dept: INTERNAL MEDICINE | Facility: CLINIC | Age: 87
End: 2022-10-24

## 2022-10-24 NOTE — TELEPHONE ENCOUNTER
Patient's son Godfrey calls. Patient has prescription for Flovent HFA inhaler and complaining that her mouth and throat are sore after using this. Patient wanting to stop taking this due to soreness. He read the medication information and saw that fungal infections can occur with this. Asked if patient is rinsing her mouth out after using Flovent, he states she is not. Advised to try rinsing mouth with water and spitting the water out immediately after using Flovent to see if this helps. Recommended they call back if symptoms do not improve.     Thea Coreas RN  [facility]

## 2022-10-24 NOTE — TELEPHONE ENCOUNTER
Spoke with patient's daughter and rescheduled to [facility] next opening in January and added to cx list.

## 2022-11-04 NOTE — TELEPHONE ENCOUNTER
Please reach out to patient regarding cancellation with [facility] on 11/16. If she declines please continue down the cancellation list.

## 2022-11-22 ENCOUNTER — HOSPITAL ENCOUNTER (OUTPATIENT)
Dept: WOUND CARE | Facility: CLINIC | Age: 87
Discharge: HOME | End: 2022-11-22
Attending: PHYSICIAN ASSISTANT | Admitting: PHYSICIAN ASSISTANT
Payer: COMMERCIAL

## 2022-11-22 VITALS — SYSTOLIC BLOOD PRESSURE: 166 MMHG | TEMPERATURE: 98.3 F | HEART RATE: 99 BPM | DIASTOLIC BLOOD PRESSURE: 97 MMHG

## 2022-11-22 DIAGNOSIS — L30.9 DERMATITIS: Primary | ICD-10-CM

## 2022-11-22 PROCEDURE — G0463 HOSPITAL OUTPT CLINIC VISIT: HCPCS

## 2022-11-22 PROCEDURE — 99213 OFFICE O/P EST LOW 20 MIN: CPT | Performed by: PHYSICIAN ASSISTANT

## 2022-11-22 NOTE — DISCHARGE INSTRUCTIONS
[facility]  [address] [facility] [address], [place] [address]  Appointment Phone [phone]     Norma F Alpers      10/29/1934    Congrats! Your dermatitis is mostly resolved!     Wound care recommendations gluteus/sacrum: twice a day and as needed  Clean the area at least twice daily with Vernell or Noleo cleanser (using a dry pad or dry washcloth)  Continue CriticAid Antifungal barrier ointment.  If it becomes more raw, red, and itchy, use Triple Paste.    Continue to off load the buttocks, side to side in chair, use pillows, get up out of chair every hour  Discoloration will fade over time     Skin care all over body:  - Use unscented, hypoallergenic cleanser such as CeraVe, Dove or Cetaphil  - Apply greasy moisturizer (Aquaphor, CeraVe ointment, etc) immediately after toweling off.  - Bathe 2-4 times a week using tepid (not hot) water  - Use humidifier in bedroom  - Stay hydrated   - Continue fish oil supplements    Wound Clinic follow up in the future if there are any wound concerns     Joyce Anderson PA-C November 22, 2022    Call us at [phone] if you have any questions about your wounds, have redness or swelling around your wound, have a fever of 101 or greater or if you have any other problems or concerns. We answer the phone Monday through Friday 8 am to 4 pm, please leave a message as we check the voicemail frequently throughout the day.

## 2022-11-22 NOTE — PROGRESS NOTES
"[facility]    ASSESSMENT:   1. Incontinence associated dermatitis    PLAN/DISCUSSION:   1. Cleanse with Vernell or Noleo, apply clear barrier AF ointment. If rash returns apply TAC 0.1% oint and antifungal oint (such as criticaid AF or Triple PAste AF) twice daily until rash resolved  2. Attempt to air out area, possibly sleep on chux at night   3. Reposition frequently, avoid sleeping in recliner  4. See bottom of note for detailed wound care and patient instructions    HISTORY OF PRESENT ILLNESS:   Norma F Alpers is a 87 year old female with urinary incontinence and IBS with diarrhea who presents today for rash and soreness over her sacrum. She has been dealing with this for years. Has tried several different ointments - lidocaine/nifedinpine, zinc oxide and essential oils. Noted that a \"gel pad\" in the hospital was helpful. She washes after BM with hot water and wash cloth. Sometimes uses cerave.     Initially we had put her on a regimen of TAC and antifungal ointment and this greatly improved the rash. Today she only has minor discoloration and some flaking. Has been using antifungal ointment only.     VITALS: LMP  (LMP Unknown)      PHYSICAL EXAM:  GENERAL: Patient is alert and oriented and in no acute distress  INTEGUMENTARY: skin is smooth and intact but has purplish discoloration, likely both mild bruising and post inflammatory hyperpigmentation      MDM: 29 minutes was spent on the day of visit reviewing previous chart notes, assessing the patient and developing the plan of care, this excludes time spent on any procedures.     PATIENT INSTRUCTIONS      Further instructions from your care team           [facility]  [address] [place] [id number]  Appointment Phone [phone]     Norma F Alpers      10/29/1934    Congrats! Your dermatitis is mostly resolved!     Wound care recommendations gluteus/sacrum: twice a day and as needed  Clean " the area at least twice daily with Vernell or Noleo cleanser (using a dry pad or dry washcloth)  Continue CriticAid Antifungal barrier ointment.  If it becomes more raw, red, and itchy, use Triple Paste.    Continue to off load the buttocks, side to side in chair, use pillows, get up out of chair every hour  Discoloration will fade over time     Skin care all over body:  - Use unscented, hypoallergenic cleanser such as CeraVe, Dove or Cetaphil  - Apply greasy moisturizer (Aquaphor, CeraVe ointment, etc) immediately after toweling off.  - Bathe 2-4 times a week using tepid (not hot) water  - Use humidifier in bedroom  - Stay hydrated   - Continue fish oil supplements    Wound Clinic follow up in the future if there are any wound concerns     Joyce Anderson PA-C November 22, 2022    Call us at [phone] if you have any questions about your wounds, have redness or swelling around your wound, have a fever of 101 or greater or if you have any other problems or concerns. We answer the phone Monday through Friday 8 am to 4 pm, please leave a message as we check the voicemail frequently throughout the day.

## 2023-02-07 ENCOUNTER — HOSPITAL ENCOUNTER (OUTPATIENT)
Dept: WOUND CARE | Facility: CLINIC | Age: 88
Discharge: HOME | End: 2023-02-07
Attending: PHYSICIAN ASSISTANT | Admitting: PHYSICIAN ASSISTANT
Payer: COMMERCIAL

## 2023-02-07 ENCOUNTER — TELEPHONE (OUTPATIENT)
Dept: INTERNAL MEDICINE | Facility: CLINIC | Age: 88
End: 2023-02-07

## 2023-02-07 VITALS — HEART RATE: 90 BPM | DIASTOLIC BLOOD PRESSURE: 85 MMHG | TEMPERATURE: 97.7 F | SYSTOLIC BLOOD PRESSURE: 134 MMHG

## 2023-02-07 DIAGNOSIS — L30.9 DERMATITIS: Primary | ICD-10-CM

## 2023-02-07 DIAGNOSIS — H35.3213 EXUDATIVE AGE-RELATED MACULAR DEGENERATION OF RIGHT EYE WITH INACTIVE SCAR (H): ICD-10-CM

## 2023-02-07 PROCEDURE — G0463 HOSPITAL OUTPT CLINIC VISIT: HCPCS

## 2023-02-07 PROCEDURE — 99213 OFFICE O/P EST LOW 20 MIN: CPT | Performed by: PHYSICIAN ASSISTANT

## 2023-02-07 RX ORDER — TRIAMCINOLONE ACETONIDE 1 MG/G
OINTMENT TOPICAL 2 TIMES DAILY
Qty: 80 G | Refills: 1 | Status: SHIPPED | OUTPATIENT
Start: 2023-02-07 | End: 2023-09-12

## 2023-02-07 NOTE — PROGRESS NOTES
"[facility]    ASSESSMENT:   1. Incontinence associated dermatitis    PLAN/DISCUSSION:   1. Avoid sitz baths, if using a sitz bath use warm water and liberally moisturize immediately after patting dry  2. Unable to safely bathe herself or set up meds due to blindness, home health aid order submitted  3. Cleanse with Vernell or Noleo, apply clear barrier AF ointment. If rash/irritation returns apply TAC 0.1% oint once-twice daily until rash resolved (no longer than a week)  4. Attempt to air out area, possibly sleep on chux at night   5. Reposition frequently, avoid sleeping in recliner  6. See bottom of note for detailed wound care and patient instructions    HISTORY OF PRESENT ILLNESS:   Norma F Alpers is a 88 year old female with urinary incontinence and IBS with diarrhea who presents today for rash and soreness over her sacrum. She has been dealing with this for years. Has tried several different ointments - lidocaine/nifedinpine, zinc oxide and essential oils. Noted that a \"gel pad\" in the hospital was helpful.     Initially we had put her on a regimen of TAC and antifungal ointment and this greatly improved the rash. Today she only has minor discoloration and some flaking. Has been using antifungal ointment only. She notes that it is more sore today. Hav    VITALS: /85 (BP Location: Right arm)   Pulse 90   Temp 97.7  F (36.5  C) (Temporal)   LMP  (LMP Unknown)      PHYSICAL EXAM:  GENERAL: Patient is alert and oriented and in no acute distress  INTEGUMENTARY: skin is smooth and intact but has purplish discoloration, likely both mild bruising and post inflammatory hyperpigmentation      MDM: 29 minutes was spent on the day of visit reviewing previous chart notes, assessing the patient and developing the plan of care, this excludes time spent on any procedures.     PATIENT INSTRUCTIONS      Further instructions from your care team       Norma F Alpers      10/29/1934  A DME order was " not completed because supplies were not needed    Wound care recommendations gluteus/sacrum: Daily  Clean the area at least twice daily with Vernell or Noleo cleanser (using a dry pad or dry washcloth)  Continue CriticAid Antifungal barrier ointment. If it becomes more raw, red, and itchy, use Triple Paste.  New order placed for triamcinolone use for only 1 Week  Continue to off load the buttocks, side to side in chair, use pillows, get up out of chair every hour  Discoloration will fade over time  Skin care all over body:  - Use unscented, hypoallergenic cleanser such as CeraVe, Dove or Cetaphil  - Apply greasy moisturizer (Aquaphor, CeraVe ointment, etc) immediately after toweling off.  - Bathe 2-4 times a week using tepid (not hot) water  - Use humidifier in bedroom  - Stay hydrated  - Continue fish oil supplements    Wound Clinic follow up in the future if there are any wound concerns     Joyce Anderson PA-C February 7, 2023    Call us at [phone] if you have any questions about your wounds, have redness or swelling around your wound, have a fever of 101 or greater or if you have any other problems or concerns. We answer the phone Monday through Friday 8 am to 4 pm, please leave a message as we check the voicemail frequently throughout the day.     If you had a positive experience please indicate that on your patient satisfaction survey form that [facility] will be sending you.  It was a pleasure meeting with you today.  Thank you for allowing me and my team the privilege of caring for you today.  YOU are the reason we are here, and I truly hope we provided you with the excellent service you deserve.  Please let us know if there is anything else we can do for you so that we can be sure you are leaving completely satisfied with your care experience.      If you have any billing related questions please call the [facility] Business office at [phone]. The clinic staff does not handle billing related  matters.  If you are scheduled to have a follow up appointment, you will receive a reminder call the day before your visit. On the appointment day please arrive 15 minutes prior to your appointment time. If you are unable to keep that appointment, please call the clinic to cancel or reschedule. If you are more than 10 minutes late or greater for your appointment, the clinic policy is that you may be asked to reschedule.

## 2023-02-07 NOTE — TELEPHONE ENCOUNTER
"Called daughter Bessy back, no answer and the outgoing message stated \"The mailbox is full and cannot accept any messages at this time.\"  TENZIN Pinzon R.N.    "

## 2023-02-07 NOTE — DISCHARGE INSTRUCTIONS
Lata MARTIN Alpers      10/29/1934  A DME order was not completed because supplies were not needed    Wound care recommendations gluteus/sacrum: Daily  Clean the area at least twice daily with Vernell or Noleo cleanser (using a dry pad or dry washcloth)  Continue CriticAid Antifungal barrier ointment. If it becomes more raw, red, and itchy, use Triple Paste.  New order placed for triamcinolone use for only 1 Week  Continue to off load the buttocks, side to side in chair, use pillows, get up out of chair every hour  Discoloration will fade over time  Skin care all over body:  - Use unscented, hypoallergenic cleanser such as CeraVe, Dove or Cetaphil  - Apply greasy moisturizer (Aquaphor, CeraVe ointment, etc) immediately after toweling off.  - Bathe 2-4 times a week using tepid (not hot) water  - Use humidifier in bedroom  - Stay hydrated  - Continue fish oil supplements    Wound Clinic follow up in the future if there are any wound concerns     Joyce Anderson PA-C February 7, 2023    Call us at [phone] if you have any questions about your wounds, have redness or swelling around your wound, have a fever of 101 or greater or if you have any other problems or concerns. We answer the phone Monday through Friday 8 am to 4 pm, please leave a message as we check the voicemail frequently throughout the day.     If you had a positive experience please indicate that on your patient satisfaction survey form that [facility] will be sending you.  It was a pleasure meeting with you today.  Thank you for allowing me and my team the privilege of caring for you today.  YOU are the reason we are here, and I truly hope we provided you with the excellent service you deserve.  Please let us know if there is anything else we can do for you so that we can be sure you are leaving completely satisfied with your care experience.      If you have any billing related questions please call the [facility] Business office at [phone].  The clinic staff does not handle billing related matters.  If you are scheduled to have a follow up appointment, you will receive a reminder call the day before your visit. On the appointment day please arrive 15 minutes prior to your appointment time. If you are unable to keep that appointment, please call the clinic to cancel or reschedule. If you are more than 10 minutes late or greater for your appointment, the clinic policy is that you may be asked to reschedule.

## 2023-02-07 NOTE — TELEPHONE ENCOUNTER
Reason for Call:  Appointment Request    Patient requesting this type of appt:  ofv for confusion & disorientation    Requested provider: Declan Schmitt    Reason patient unable to be scheduled: Not within requested timeframe    When does patient want to be seen/preferred time: ASAP    Comments: Daughter called in requesting appt for pt but no availability with provider for a few months    Okay to leave a detailed message?: Yes at [phone]    Call taken on 2/7/2023 at 10:29 AM by Sangita Kowalski

## 2023-02-08 NOTE — TELEPHONE ENCOUNTER
Left message on Bessy's voicemail asking her to call back and speak to any available triage nurse. TENZIN Pinzon R.N.

## 2023-02-09 NOTE — TELEPHONE ENCOUNTER
"Spoke to patient's daughter Bessy and she said now her mom seems better. Bessy states  She is not sure if it is a \"game\" her mom is playing or what. Bessy does want to get her mom checked out so we scheduled an appointment with Dr Schmitt on 2/15/23.  "

## 2023-02-26 ENCOUNTER — NURSE TRIAGE (OUTPATIENT)
Dept: NURSING | Facility: CLINIC | Age: 88
End: 2023-02-26
Payer: COMMERCIAL

## 2023-02-26 NOTE — TELEPHONE ENCOUNTER
Per updated [facility] weekend workflow and routing by order of High Priority patients, this encounter will be reviewed by the clinic within 1 business day. Patient will be on day 3 as of Monday 2/27. [facility] and will await clinic review Monday.     Kenzie Fischer RN, BSN Nurse Triage Advisor Supervisor 4:58 PM 2/26/2023

## 2023-02-26 NOTE — TELEPHONE ENCOUNTER
Son calling back. Upset that he still has not gotten call back and prescription for Paxlovid for his mother.   Given [place] [facility] phone number--he will call them now for treatment tonioght.     Rachel Molina RN Triage Nurse Advisor 5:52 PM 2/26/2023

## 2023-02-26 NOTE — TELEPHONE ENCOUNTER
Son calling back for Paxlovid--pharmacy closes @ 6pm. [facility]. He lives 2 hrs away, but is with his mother currently. He wants to get the RX today before the pharmacy closes, and he needs to return home.     Message will be routed to pool as urgent per son's request.    Rachel Molina RN Triage Nurse Advisor 4:12 PM 2/26/2023

## 2023-02-26 NOTE — TELEPHONE ENCOUNTER
Pt's son calling, consent to communicate on file.     COVID Positive/Requesting COVID treatment    Patient is positive for COVID and requesting treatment options.    Date of positive COVID test (PCR or at home)? 2/26 Sunday via home test  Current COVID symptoms: fever or chills, cough, fatigue, headache, sore throat and congestion or runny nose, diarrhea  Date COVID symptoms began: 2/24 Friday    Message should be routed to clinic RN pool. Best phone number to use for call back: [phone], Son Teodoro    History of asthma, no change in breathing baseline. Covid guidelines reviewed with son. Call back if symptoms worsen or have other questions/concerns.     Livia Santana, RN, BSN  2/26/2023 at 11:55 AM  [facility]      Reason for Disposition    [1] HIGH RISK for severe COVID complications (e.g., weak immune system, age > 64 years, obesity with BMI > 25, pregnant, chronic lung disease or other chronic medical condition) AND [2] COVID symptoms (e.g., cough, fever)  (Exceptions: Already seen by PCP and no new or worsening symptoms.)    Additional Information    Negative: SEVERE difficulty breathing (e.g., struggling for each breath, speaks in single words)    Negative: Difficult to awaken or acting confused (e.g., disoriented, slurred speech)    Negative: Bluish (or gray) lips or face now    Negative: Shock suspected (e.g., cold/pale/clammy skin, too weak to stand, low BP, rapid pulse)    Negative: Sounds like a life-threatening emergency to the triager    Negative: SEVERE or constant chest pain or pressure  (Exception: Mild central chest pain, present only when coughing.)    Negative: MODERATE difficulty breathing (e.g., speaks in phrases, SOB even at rest, pulse 100-120)    Negative: [1] Headache AND [2] stiff neck (can't touch chin to chest)    Negative: Oxygen level (e.g., pulse oximetry) 90 percent or lower    Negative: Chest pain or pressure    Negative: Patient sounds very sick or weak to the  triager    Negative: MILD difficulty breathing (e.g., minimal/no SOB at rest, SOB with walking, pulse <100)    Negative: Fever > 103 F (39.4 C)    Negative: [1] Fever > 101 F (38.3 C) AND [2] age > 60 years    Negative: [1] Fever > 100.0 F (37.8 C) AND [2] bedridden (e.g., nursing home patient, CVA, chronic illness, recovering from surgery)    Protocols used: CORONAVIRUS (COVID-19) DIAGNOSED OR [id number]

## 2023-02-27 ENCOUNTER — NURSE TRIAGE (OUTPATIENT)
Dept: INTERNAL MEDICINE | Facility: CLINIC | Age: 88
End: 2023-02-27
Payer: COMMERCIAL

## 2023-02-27 DIAGNOSIS — I10 BENIGN ESSENTIAL HYPERTENSION: ICD-10-CM

## 2023-02-27 RX ORDER — AMLODIPINE BESYLATE 5 MG/1
TABLET ORAL
Qty: 90 TABLET | Refills: 1 | Status: SHIPPED | OUTPATIENT
Start: 2023-02-27 | End: 2023-10-16

## 2023-02-27 NOTE — TELEPHONE ENCOUNTER
RN COVID TREATMENT VISIT  02/27/23  Call daughter Bessy back, not patient.      Norma F Alpers  88 year old  Current weight? 174 lb    Has the patient been seen by a primary care provider at an [facility] or [facility] within the past two years? Yes.   Have you been in close proximity to/do you have a known exposure to a person with a confirmed case of influenza? No.     General treatment eligibility:  Date of positive COVID test (PCR or at home)?  2/26/23    Are you or have you been hospitalized for this COVID-19 infection? No.   Have you received monoclonal antibodies or antiviral treatment for COVID-19 since this positive test? No.   Do you have any of the following conditions that place you at risk of being very sick from COVID-19?   - Age 50 years or older  - Chronic lung diseases such as asthma, bronchiectasis, COPD, interstitial lung disease, pulmonary embolism, pulmonary hypertension   Yes, patient has at least one high risk condition as noted above.     Current COVID symptoms:   - cough  - shortness of breath or difficulty breathing  - fatigue  - muscle or body aches  - sore throat  - congestion or runny nose  Yes. Patient has at least one symptom as selected.     How many days since symptoms started? 6-7 days.   Is patient at least 16 years old? Yes  Does patient meet one of the following?   - RN is unsure if the patient qualifies based on the criteria provided. (See Paxlovid Standing Order Policy for full list)  No, patient informed they do not qualify for treatment by RN as symptoms have been going on 7 days.  Had cold symptoms starting 7 days ago, had positive Covid test yesterday at home. They have no idea how she got Covid as she hasn't gone anywhere or had any visitors.  Route to providers for consideration of treatment.   Venecia Pinzon RN

## 2023-04-17 DIAGNOSIS — I10 BENIGN ESSENTIAL HYPERTENSION: ICD-10-CM

## 2023-04-17 DIAGNOSIS — M85.80 OSTEOPENIA, UNSPECIFIED LOCATION: ICD-10-CM

## 2023-04-19 RX ORDER — RALOXIFENE HYDROCHLORIDE 60 MG/1
TABLET, FILM COATED ORAL
Qty: 90 TABLET | Refills: 0 | Status: SHIPPED | OUTPATIENT
Start: 2023-04-19 | End: 2023-05-10

## 2023-04-19 RX ORDER — POTASSIUM CHLORIDE 1500 MG/1
TABLET, EXTENDED RELEASE ORAL
Qty: 180 TABLET | Refills: 0 | Status: SHIPPED | OUTPATIENT
Start: 2023-04-19 | End: 2023-05-10

## 2023-04-19 NOTE — TELEPHONE ENCOUNTER
Medication is being filled for 1 time refill only due to:  Patient needs to be seen because pt is due for her annual wellness exam.  .    Please call patient and get her scheduled.  Thanks!

## 2023-05-10 ENCOUNTER — OFFICE VISIT (OUTPATIENT)
Dept: INTERNAL MEDICINE | Facility: CLINIC | Age: 88
End: 2023-05-10
Payer: COMMERCIAL

## 2023-05-10 VITALS
SYSTOLIC BLOOD PRESSURE: 124 MMHG | TEMPERATURE: 98 F | WEIGHT: 168 LBS | OXYGEN SATURATION: 96 % | BODY MASS INDEX: 27.99 KG/M2 | DIASTOLIC BLOOD PRESSURE: 84 MMHG | HEIGHT: 65 IN | HEART RATE: 95 BPM | RESPIRATION RATE: 16 BRPM

## 2023-05-10 DIAGNOSIS — F41.9 ANXIETY: ICD-10-CM

## 2023-05-10 DIAGNOSIS — R53.83 FATIGUE, UNSPECIFIED TYPE: ICD-10-CM

## 2023-05-10 DIAGNOSIS — E03.9 ACQUIRED HYPOTHYROIDISM: ICD-10-CM

## 2023-05-10 DIAGNOSIS — G47.00 INSOMNIA, UNSPECIFIED TYPE: ICD-10-CM

## 2023-05-10 DIAGNOSIS — R06.09 DYSPNEA ON EXERTION: ICD-10-CM

## 2023-05-10 DIAGNOSIS — E78.5 HYPERLIPIDEMIA LDL GOAL <100: ICD-10-CM

## 2023-05-10 DIAGNOSIS — I10 BENIGN ESSENTIAL HYPERTENSION: ICD-10-CM

## 2023-05-10 DIAGNOSIS — E11.9 TYPE 2 DIABETES MELLITUS WITHOUT COMPLICATION, WITHOUT LONG-TERM CURRENT USE OF INSULIN (H): ICD-10-CM

## 2023-05-10 DIAGNOSIS — E04.1 THYROID NODULE: ICD-10-CM

## 2023-05-10 DIAGNOSIS — Z00.00 ENCOUNTER FOR MEDICARE ANNUAL WELLNESS EXAM: Primary | ICD-10-CM

## 2023-05-10 DIAGNOSIS — H35.3213 EXUDATIVE AGE-RELATED MACULAR DEGENERATION OF RIGHT EYE WITH INACTIVE SCAR (H): ICD-10-CM

## 2023-05-10 DIAGNOSIS — M85.80 OSTEOPENIA, UNSPECIFIED LOCATION: ICD-10-CM

## 2023-05-10 DIAGNOSIS — F32.4 MAJOR DEPRESSIVE DISORDER WITH SINGLE EPISODE, IN PARTIAL REMISSION (H): ICD-10-CM

## 2023-05-10 DIAGNOSIS — H91.93 BILATERAL HEARING LOSS, UNSPECIFIED HEARING LOSS TYPE: ICD-10-CM

## 2023-05-10 LAB
CHOLEST SERPL-MCNC: 209 MG/DL
CREAT UR-MCNC: 91.7 MG/DL
ERYTHROCYTE [DISTWIDTH] IN BLOOD BY AUTOMATED COUNT: 15.2 % (ref 10–15)
HBA1C MFR BLD: 6.8 % (ref 0–5.6)
HCT VFR BLD AUTO: 43.7 % (ref 35–47)
HDLC SERPL-MCNC: 55 MG/DL
HGB BLD-MCNC: 14.3 G/DL (ref 11.7–15.7)
LDLC SERPL CALC-MCNC: 121 MG/DL
MCH RBC QN AUTO: 29.2 PG (ref 26.5–33)
MCHC RBC AUTO-ENTMCNC: 32.7 G/DL (ref 31.5–36.5)
MCV RBC AUTO: 89 FL (ref 78–100)
MICROALBUMIN UR-MCNC: 106 MG/L
MICROALBUMIN/CREAT UR: 115.59 MG/G CR (ref 0–25)
NONHDLC SERPL-MCNC: 154 MG/DL
PLATELET # BLD AUTO: 259 10E3/UL (ref 150–450)
RBC # BLD AUTO: 4.9 10E6/UL (ref 3.8–5.2)
TRIGL SERPL-MCNC: 164 MG/DL
WBC # BLD AUTO: 10.8 10E3/UL (ref 4–11)

## 2023-05-10 PROCEDURE — 82570 ASSAY OF URINE CREATININE: CPT | Performed by: INTERNAL MEDICINE

## 2023-05-10 PROCEDURE — 82043 UR ALBUMIN QUANTITATIVE: CPT | Performed by: INTERNAL MEDICINE

## 2023-05-10 PROCEDURE — 80061 LIPID PANEL: CPT | Performed by: INTERNAL MEDICINE

## 2023-05-10 PROCEDURE — 83036 HEMOGLOBIN GLYCOSYLATED A1C: CPT | Performed by: INTERNAL MEDICINE

## 2023-05-10 PROCEDURE — 99214 OFFICE O/P EST MOD 30 MIN: CPT | Mod: 25 | Performed by: INTERNAL MEDICINE

## 2023-05-10 PROCEDURE — 36415 COLL VENOUS BLD VENIPUNCTURE: CPT | Performed by: INTERNAL MEDICINE

## 2023-05-10 PROCEDURE — 99207 PR FOOT EXAM NO CHARGE: CPT | Performed by: INTERNAL MEDICINE

## 2023-05-10 PROCEDURE — 80053 COMPREHEN METABOLIC PANEL: CPT | Performed by: INTERNAL MEDICINE

## 2023-05-10 PROCEDURE — 84443 ASSAY THYROID STIM HORMONE: CPT | Performed by: INTERNAL MEDICINE

## 2023-05-10 PROCEDURE — G0439 PPPS, SUBSEQ VISIT: HCPCS | Performed by: INTERNAL MEDICINE

## 2023-05-10 PROCEDURE — 85027 COMPLETE CBC AUTOMATED: CPT | Performed by: INTERNAL MEDICINE

## 2023-05-10 RX ORDER — TRAZODONE HYDROCHLORIDE 150 MG/1
TABLET ORAL
Qty: 90 TABLET | Refills: 3 | Status: SHIPPED | OUTPATIENT
Start: 2023-05-10 | End: 2023-07-10

## 2023-05-10 RX ORDER — SERTRALINE HYDROCHLORIDE 100 MG/1
150 TABLET, FILM COATED ORAL DAILY
Qty: 135 TABLET | Refills: 3 | Status: SHIPPED | OUTPATIENT
Start: 2023-05-10 | End: 2024-06-04

## 2023-05-10 RX ORDER — LEVOTHYROXINE SODIUM 75 UG/1
75 TABLET ORAL EVERY MORNING
Qty: 90 TABLET | Refills: 3 | Status: SHIPPED | OUTPATIENT
Start: 2023-05-10 | End: 2024-06-10

## 2023-05-10 RX ORDER — PRAVASTATIN SODIUM 80 MG/1
80 TABLET ORAL DAILY
Qty: 90 TABLET | Refills: 3 | Status: SHIPPED | OUTPATIENT
Start: 2023-05-10 | End: 2024-07-11

## 2023-05-10 RX ORDER — RALOXIFENE HYDROCHLORIDE 60 MG/1
1 TABLET, FILM COATED ORAL DAILY
Qty: 90 TABLET | Refills: 3 | Status: SHIPPED | OUTPATIENT
Start: 2023-05-10 | End: 2024-07-11

## 2023-05-10 RX ORDER — POTASSIUM CHLORIDE 1500 MG/1
TABLET, EXTENDED RELEASE ORAL
Qty: 180 TABLET | Refills: 0 | Status: SHIPPED | OUTPATIENT
Start: 2023-05-10 | End: 2023-05-10

## 2023-05-10 RX ORDER — POTASSIUM CHLORIDE 1500 MG/1
TABLET, EXTENDED RELEASE ORAL
Qty: 180 TABLET | Refills: 3 | Status: SHIPPED | OUTPATIENT
Start: 2023-05-10

## 2023-05-10 SDOH — ECONOMIC STABILITY: FOOD INSECURITY: WITHIN THE PAST 12 MONTHS, THE FOOD YOU BOUGHT JUST DIDN'T LAST AND YOU DIDN'T HAVE MONEY TO GET MORE.: NEVER TRUE

## 2023-05-10 SDOH — ECONOMIC STABILITY: TRANSPORTATION INSECURITY
IN THE PAST 12 MONTHS, HAS LACK OF TRANSPORTATION KEPT YOU FROM MEETINGS, WORK, OR FROM GETTING THINGS NEEDED FOR DAILY LIVING?: NO

## 2023-05-10 SDOH — ECONOMIC STABILITY: INCOME INSECURITY: IN THE LAST 12 MONTHS, WAS THERE A TIME WHEN YOU WERE NOT ABLE TO PAY THE MORTGAGE OR RENT ON TIME?: NO

## 2023-05-10 SDOH — ECONOMIC STABILITY: TRANSPORTATION INSECURITY
IN THE PAST 12 MONTHS, HAS THE LACK OF TRANSPORTATION KEPT YOU FROM MEDICAL APPOINTMENTS OR FROM GETTING MEDICATIONS?: NO

## 2023-05-10 SDOH — ECONOMIC STABILITY: FOOD INSECURITY: WITHIN THE PAST 12 MONTHS, YOU WORRIED THAT YOUR FOOD WOULD RUN OUT BEFORE YOU GOT MONEY TO BUY MORE.: NEVER TRUE

## 2023-05-10 ASSESSMENT — ENCOUNTER SYMPTOMS
NERVOUS/ANXIOUS: 1
DIZZINESS: 1
MYALGIAS: 0
SHORTNESS OF BREATH: 1
HEMATOCHEZIA: 0
WEAKNESS: 1
FEVER: 0
HEADACHES: 1
HEMATURIA: 0
EYE PAIN: 0
DIARRHEA: 0
HEARTBURN: 0
PALPITATIONS: 0
JOINT SWELLING: 0
ABDOMINAL PAIN: 0
CONSTIPATION: 0
SORE THROAT: 0
FREQUENCY: 0
ARTHRALGIAS: 1
NAUSEA: 0
CHILLS: 0
DYSURIA: 0
PARESTHESIAS: 0
COUGH: 0

## 2023-05-10 ASSESSMENT — PATIENT HEALTH QUESTIONNAIRE - PHQ9
SUM OF ALL RESPONSES TO PHQ QUESTIONS 1-9: 21
SUM OF ALL RESPONSES TO PHQ QUESTIONS 1-9: 21
10. IF YOU CHECKED OFF ANY PROBLEMS, HOW DIFFICULT HAVE THESE PROBLEMS MADE IT FOR YOU TO DO YOUR WORK, TAKE CARE OF THINGS AT HOME, OR GET ALONG WITH OTHER PEOPLE: EXTREMELY DIFFICULT

## 2023-05-10 ASSESSMENT — ACTIVITIES OF DAILY LIVING (ADL)
CURRENT_FUNCTION: BATHING REQUIRES ASSISTANCE
CURRENT_FUNCTION: TELEPHONE REQUIRES ASSISTANCE
CURRENT_FUNCTION: PREPARING MEALS REQUIRES ASSISTANCE
CURRENT_FUNCTION: MONEY MANAGEMENT REQUIRES ASSISTANCE
CURRENT_FUNCTION: MEDICATION ADMINISTRATION REQUIRES ASSISTANCE
CURRENT_FUNCTION: LAUNDRY REQUIRES ASSISTANCE
CURRENT_FUNCTION: SHOPPING REQUIRES ASSISTANCE
CURRENT_FUNCTION: HOUSEWORK REQUIRES ASSISTANCE
CURRENT_FUNCTION: TRANSPORTATION REQUIRES ASSISTANCE

## 2023-05-10 ASSESSMENT — ASTHMA QUESTIONNAIRES
QUESTION_1 LAST FOUR WEEKS HOW MUCH OF THE TIME DID YOUR ASTHMA KEEP YOU FROM GETTING AS MUCH DONE AT WORK, SCHOOL OR AT HOME: SOME OF THE TIME
QUESTION_3 LAST FOUR WEEKS HOW OFTEN DID YOUR ASTHMA SYMPTOMS (WHEEZING, COUGHING, SHORTNESS OF BREATH, CHEST TIGHTNESS OR PAIN) WAKE YOU UP AT NIGHT OR EARLIER THAN USUAL IN THE MORNING: NOT AT ALL
ACT_TOTALSCORE: 16
QUESTION_2 LAST FOUR WEEKS HOW OFTEN HAVE YOU HAD SHORTNESS OF BREATH: MORE THAN ONCE A DAY
QUESTION_4 LAST FOUR WEEKS HOW OFTEN HAVE YOU USED YOUR RESCUE INHALER OR NEBULIZER MEDICATION (SUCH AS ALBUTEROL): ONCE A WEEK OR LESS
QUESTION_5 LAST FOUR WEEKS HOW WOULD YOU RATE YOUR ASTHMA CONTROL: SOMEWHAT CONTROLLED
ACT_TOTALSCORE: 16

## 2023-05-10 ASSESSMENT — LIFESTYLE VARIABLES
HOW OFTEN DO YOU HAVE SIX OR MORE DRINKS ON ONE OCCASION: NEVER
HOW MANY STANDARD DRINKS CONTAINING ALCOHOL DO YOU HAVE ON A TYPICAL DAY: 1 OR 2
HOW OFTEN DO YOU HAVE A DRINK CONTAINING ALCOHOL: MONTHLY OR LESS
SKIP TO QUESTIONS 9-10: 1
AUDIT-C TOTAL SCORE: 1

## 2023-05-10 ASSESSMENT — SOCIAL DETERMINANTS OF HEALTH (SDOH): HOW HARD IS IT FOR YOU TO PAY FOR THE VERY BASICS LIKE FOOD, HOUSING, MEDICAL CARE, AND HEATING?: NOT HARD AT ALL

## 2023-05-10 NOTE — PATIENT INSTRUCTIONS
Patient Education   Personalized Prevention Plan  You are due for the preventive services outlined below.  Your care team is available to assist you in scheduling these services.  If you have already completed any of these items, please share that information with your care team to update in your medical record.  Health Maintenance Due   Topic Date Due    Asthma Action Plan - yearly  Never done    Diptheria Tetanus Pertussis (DTAP/TDAP/TD) Vaccine (2 - Td or Tdap) 06/20/2022    Eye Exam  01/01/2023    Cholesterol Lab  03/31/2023    Kidney Microalbumin Urine Test  03/31/2023    Thyroid Function Lab  03/31/2023    Diabetic Foot Exam  04/07/2023       Understanding USDA MyPlate  The USDA has guidelines to help you make healthy food choices. These are called MyPlate. MyPlate shows the food groups that make up healthy meals using the image of a place setting. Before you eat, think about the healthiest choices for what to put on your plate or in your cup or bowl. To learn more about building a healthy plate, visit www.choosemyplate.gov.     The food groups  Fruits. Any fruit or 100% fruit juice counts as part of the Fruit Group. Fruits may be fresh, canned, frozen, or dried, and may be whole, cut-up, or pureed. Make 1/2 of your plate fruits and vegetables.  Vegetables. Any vegetable or 100% vegetable juice counts as a member of the Vegetable Group. Vegetables may be fresh, frozen, canned, or dried. They can be served raw or cooked and may be whole, cut-up, or mashed. Make 1/2 of your plate fruits and vegetables.  Grains. All foods made from grains are part of the Grains Group. These include wheat, rice, oats, cornmeal, and barley. Grains are often used to make foods such as bread, pasta, oatmeal, cereal, tortillas, and grits. Grains should be no more than 1/4 of your plate. At least half of your grains should be whole grains.  Protein. This group includes meat, poultry, seafood, beans and peas, eggs, processed soy  products (such as tofu), nuts (including nut butters), and seeds. Make protein choices no more than 1/4 of your plate. Meat and poultry choices should be lean or low fat.  Dairy. The Dairy Group includes all fluid milk products and foods made from milk that contain calcium, such as yogurt and cheese. (Foods that have little calcium, such as cream, butter, and cream cheese, are not part of this group.) Most dairy choices should be low-fat or fat-free.  Oils. Oils aren't a food group, but they do contain essential nutrients. However it's important to watch your intake of oils. These are fats that are liquid at room temperature. They include canola, corn, olive, soybean, vegetable, and sunflower oil. Foods that are mainly oil include mayonnaise, certain salad dressings, and soft margarines. You likely already get your daily oil allowance from the foods you eat.  Things to limit  Eating healthy also means limiting these things in your diet:  Salt (sodium). Many processed foods have a lot of sodium. To keep sodium intake down, eat fresh vegetables, meats, poultry, and seafood when possible. Purchase low-sodium, reduced-sodium, or no-salt-added food products at the store. And don't add salt to your meals at home. Instead, season them with herbs and spices such as dill, oregano, cumin, and paprika. Or try adding flavor with lemon or lime zest and juice.  Saturated fat. Saturated fats are most often found in animal products such as beef, pork, and chicken. They are often solid at room temperature, such as butter. To reduce your saturated fat intake, choose leaner cuts of meat and poultry. And try healthier cooking methods such as grilling, broiling, roasting, or baking. For a simple lower-fat swap, use plain nonfat yogurt instead of mayonnaise when making potato salad or macaroni salad.  Added sugars. These are sugars added to foods. They are in foods such as ice cream, candy, soda, fruit drinks, sports drinks, energy  drinks, cookies, pastries, jams, and syrups. Cut down on added sugars by sharing sweet treats with a family member or friend. You can also choose fruit for dessert, and drink water or other unsweetened beverages.  [employer] last reviewed this educational content on 6/1/2020 2000-2022 [employer]. All rights reserved. This information is not intended as a substitute for professional medical care. Always follow your healthcare professional's instructions.          Signs of Hearing Loss  Hearing loss is a problem shared by many people. In fact, it's one of the most common health problems, particularly as people age. Most people aged 65 and older have some hearing loss. By age 80, almost everyone does. Hearing loss often occurs slowly over the years. So, you may not realize your hearing has gotten worse.   When sudden hearing loss occurs, it's important to contact your healthcare provider right away. Your provider will do a medical exam and a hearing exam as soon as possible. This is to help find the cause and type of your sudden hearing loss. Based on your diagnosis, your healthcare provider will discuss possible treatments.      Hearing much better with one ear can be a sign of hearing loss.     Have your hearing checked  Call your healthcare provider if you:   Have to strain to hear normal conversation  Have to watch other people s faces very carefully to follow what they re saying  Need to ask people to repeat what they ve said  Often misunderstand what people are saying  Turn the volume of the television or radio up so high that others complain  Feel that people are mumbling when they re talking to you  Find that the effort to hear leaves you feeling tired and irritated  Notice, when using the phone, that you hear better with one ear than the other  [employer] last reviewed this educational content on 6/1/2022 2000-2022 [employer]. All rights reserved. This information is not  intended as a substitute for professional medical care. Always follow your healthcare professional's instructions.          Urinary Incontinence, Female (Adult)   Urinary incontinence means loss of bladder control. This problem affects many women, especially as they get older. If you have incontinence, you may be embarrassed to ask for help. But know that this problem can be treated.   Types of Incontinence  There are different types of incontinence. Two of the main types are described here. You can have more than one type.   Stress incontinence. With this type, urine leaks when pressure (stress) is put on the bladder. This may happen when you cough, sneeze, or laugh. Stress incontinence most often occurs because the pelvic floor muscles that support the bladder and urethra are weak. This can happen after pregnancy and vaginal childbirth or a hysterectomy. It can also be due to excess body weight or hormone changes.  Urge incontinence (also called overactive bladder). With this type, a sudden urge to urinate is felt often. This may happen even though there may not be much urine in the bladder. The need to urinate often during the night is common. Urge incontinence most often occurs because of bladder spasms. This may be due to bladder irritation or infection. Damage to bladder nerves or pelvic muscles, constipation, and certain medicines can also lead to urge incontinence.  Treatment depends on the cause. Further evaluation is needed to find the type you have. This will likely include an exam and certain tests. Based on the results, you and your healthcare provider can then plan treatment. Until a diagnosis is made, the home care tips below can help ease symptoms.   Home care  Do pelvic floor muscle exercises, if they are prescribed. The pelvic floor muscles help support the bladder and urethra. Many women find that their symptoms improve when doing special exercises that strengthen these muscles. To do the  exercises, contract the muscles you would use to stop your stream of urine. But do this when you re not urinating. Hold for 10 seconds, then relax. Repeat 10 to 20 times in a row, at least 3 times a day. Your healthcare provider may give you other instructions for how to do the exercises and how often.  Keep a bladder diary. This helps track how often and how much you urinate over a set period of time. Bring this diary with you to your next visit with the provider. The information can help your provider learn more about your bladder problem.  Lose weight, if advised to by your provider. Extra weight puts pressure on the bladder. Your provider can help you create a weight-loss plan that s right for you. This may include exercising more and making certain diet changes.  Don't have foods and drinks that may irritate the bladder. These can include alcohol and caffeinated drinks.  Quit smoking. Smoking and other tobacco use can lead to a long-term (chronic) cough that strains the pelvic floor muscles. Smoking may also damage the bladder and urethra. Talk with your provider about treatments or methods you can use to quit smoking.  If drinking large amounts of fluid makes you have symptoms, you may be advised to limit your fluid intake. You may also be advised to drink most of your fluids during the day and to limit fluids at night.  If you re worried about urine leakage or accidents, you may wear absorbent pads to catch urine. Change the pads often. This helps reduce discomfort. It may also reduce the risk of skin or bladder infections.    Follow-up care  Follow up with your healthcare provider, or as directed. It may take some to find the right treatment for your problem. But healthy lifestyle changes can be made right away. These include such things as exercising on a regular basis, eating a healthy diet, losing weight (if needed), and quitting smoking. Your treatment plan may include special therapies or medicines.  Certain procedures or surgery may also be options. Talk about any questions you have with your provider.   When to seek medical advice  Call the healthcare provider right away if any of these occur:  Fever of 100.4 F (38 C) or higher, or as directed by your provider  Bladder pain or fullness  Belly swelling  Nausea or vomiting  Back pain  Weakness, dizziness, or fainting  [employer] last reviewed this educational content on 1/1/2020 2000-2022 [employer]. All rights reserved. This information is not intended as a substitute for professional medical care. Always follow your healthcare professional's instructions.          Preventing Falls at Home  A person can fall for many reasons. Older adults may fall because reaction time slows down as we age. Your muscles and joints may get stiff, weak, or less flexible because of illness, medicines, or a physical condition.   Other health problems that make falls more likely include:   Arthritis  Dizziness or lightheadedness when you stand up (orthostatic hypotension)  History of a stroke  Dizziness  Anemia  Certain medicines taken for mental illness or to control blood pressure.  Problems with balance or gait  Bladder or urinary problems  History of falling  Changes in vision (vision impairment)  Changes in thinking skills and memory (cognitive impairment)  Injuries from a fall can include serious injuries such as broken bones, dislocated joints, internal bleeding and cuts. Injuries like these can limit your independence.   Prevention tips  To help prevent falls and fall-related injuries, follow the tips below.    Floors  To make floors safer:   Put nonskid pads under area rugs.  Remove small rugs.  Replace worn floor coverings.  Tack carpets firmly to each step on carpeted stairs. Put nonskid strips on the edges of uncarpeted stairs.  Keep floors and stairs free of clutter and cords.  Arrange furniture so there are clear pathways.  Clean up any spills right  away.  Bathrooms    To make bathrooms safer:   Install grab bars in the tub or shower.  Apply nonskid strips or put a nonskid rubber mat in the tub or shower.  Sit on a bath chair to bathe.  Use bathmats with nonskid backing.  Lighting  To improve visibility in your home:    Keep a flashlight in each room. Or put a lamp next to the bed within easy reach.  Put nightlights in the bedrooms, hallways, kitchen, and bathrooms.  Make sure all stairways have good lighting.  Take your time when going up and down stairs.  Put handrails on both sides of stairs and in walkways for more support. To prevent injury to your wrist or arm, don t use handrails to pull yourself up.  Install grab bars to pull yourself up.  Move or rearrange items that you use often. This will make them easier to find or reach.  Look at your home to find any safety hazards. Especially look at doorways, walkways, and the driveway. Remove or repair any safety problems that you find.  Other changes to make  Look around to find any safety hazards. Look closely at doorways, walkways, and the driveway. Remove or repair any safety problems that you find.  Wear shoes that fit well.  Take your time when going up and down stairs.  Put handrails on both sides of stairs and in walkways for more support. To prevent injury to your wrist or arm, don t use handrails to pull yourself up.  Install grab bars wherever needed to pull yourself up.  Arrange items that you use often. This will make them easier to find or reach.    [employer] last reviewed this educational content on 3/1/2020    6283-0151 [employer]. All rights reserved. This information is not intended as a substitute for professional medical care. Always follow your healthcare professional's instructions.      Everything looks fine!    Refills of medications have been faxed to your pharmacy.     Lab results will be available soon on [employer].    See me in six months, sooner if problems.

## 2023-05-10 NOTE — LETTER
"May 14, 2023      Norma F Alpers  [address]   [facility] [id number]        Dear Mrs. Alpers,    We are writing to inform you of your test results. Everything looks fine. Unless noted otherwise below, any results that are outside the \"normal\" range are within acceptable limits and are of no concern.     Your test results fall within the expected range(s) or remain unchanged from previous results.  Please continue with current treatment plan.    Hemoglobin A1C measures control of diabetes. Your Hemoglobin A1C was 6.8, with the target being under 7.0.    The results of your recent urine test showed an elevation in microalbumin, an early indicator of ill-effects to the kidney from diabetes.     Your hemoglobin, white blood cell count, and platelet count looked fine.  Hemoglobin measures the amount of red blood cells carrying oxygen to the body's tissues. A low hemoglobin can cause symptoms of fatigue.  WBC Count measures White Blood Cells, the cells that fight infection. The percentages of various types of white blood cells are listed and look fine.  Platelets assist in normal blood clotting. Your platelet count looks normal.    LDL= Bad Cholesterol-- the target is below 130.     HDL= Good Cholesterol-- although this is determined mostly by heredity, exercise and/or medications may sometimes raise this number.    Triglycerides are another type of fat in the blood, and can sometimes be lowered by reducing intake of sweets or excess carbohydrates, alcohol, and by weight reduction if needed.  Sometimes medications are also used.    AST and ALT are liver tests, as are the bilirubin (total and direct), albumin, total protein, and alkaline phosphatase. Yours are all normal.     Urea Nitrogen and Creatinine are kidney tests--yours are normal. GFR stands for Glomerular Filtration Rate, a more precise estimate of kidney function.    Sodium, Potassium, Chloride, Carbon Dioxide, and Calcium are all normal " salts in the bloodstream. Yours all look normal. Your glucose (blood sugar) also looks fine. (You can ignore the anion gap result).    TSH measures thyroid function. Yours is normal.       If you have any questions or concerns, please call the clinic at the number listed above.       Sincerely,

## 2023-05-10 NOTE — PROGRESS NOTES
"SUBJECTIVE:   Lata is a 88 year old who presents for Preventive Visit.        5/10/2023    10:47 AM   Additional Questions   Roomed by Lisbeth FERRARO CMA   Accompanied by Bessy, daughter   Patient has been advised of split billing requirements and indicates understanding: Yes  Are you in the first 12 months of your Medicare coverage?  No    Healthy Habits:     In general, how would you rate your overall health?  Fair    Frequency of exercise:  None    Do you usually eat at least 4 servings of fruit and vegetables a day, include whole grains    & fiber and avoid regularly eating high fat or \"junk\" foods?  No    Taking medications regularly:  Yes    Medication side effects:  Lightheadedness    Ability to successfully perform activities of daily living:  Telephone requires assistance, transportation requires assistance, shopping requires assistance, preparing meals requires assistance, housework requires assistance, bathing requires assistance, laundry requires assistance, medication administration requires assistance and money management requires assistance    Home Safety:  No safety concerns identified    Hearing Impairment:  Difficulty following a conversation in a noisy restaurant or crowded room, feel that people are mumbling or not speaking clearly, difficulty following dialogue in the theater, difficult to understand a speaker at a public meeting or [employer] service, need to ask people to speak up or repeat themselves, difficulty understanding soft or whispered speech and difficulty understanding speech on the telephone    In the past 6 months, have you been bothered by leaking of urine? Yes    In general, how would you rate your overall mental or emotional health?  Poor      PHQ-2 Total Score: 6    Additional concerns today:  Yes           Have you ever done Advance Care Planning? (For example, a Health Directive, POLST, or a discussion with a medical provider or your loved ones about your wishes): Yes, patient " [facility] has an Advance Care Planning document and will bring a copy to the clinic.       Fall risk  Fallen 2 or more times in the past year?: No  Any fall with injury in the past year?: Yes  Timed Up and Go Test (>13.5 is fall risk; contact physician) : 5    Cognitive Screening   1) Repeat 3 items (Leader, Season, Table)    2) Clock draw: NORMAL  3) 3 item recall: Recalls 3 objects  Results: 3 items recalled: COGNITIVE IMPAIRMENT LESS LIKELY    Mini-CogTM Copyright [employer]. Licensed by the author for use in [facility]; reprinted with permission (loretta@[facility]). All rights reserved.      Do you have sleep apnea, excessive snoring or daytime drowsiness?: no    Reviewed and updated as needed this visit by clinical staff   Tobacco  Allergies  Meds              Reviewed and updated as needed this visit by Provider                 Social History     Tobacco Use     Smoking status: Former     Packs/day: 0.00     Types: Cigarettes     Quit date: 3/27/1975     Years since quittin.1     Smokeless tobacco: Never   Vaping Use     Vaping status: Never Used   Substance Use Topics     Alcohol use: Yes     Comment: occasionally             5/10/2023    10:29 AM   Alcohol Use   Prescreen: >3 drinks/day or >7 drinks/week? Not Applicable     Do you have a current opioid prescription? No  Do you use any other controlled substances or medications that are not prescribed by a provider? None      PROBLEMS TO ADD ON...In addition to an Annual Wellness Exam, we addressed diabetes mellitus, hyperlipidemia, hypertension, hypothyroidism, anxiety/depression, osteopenia, insomnia.     The patient is tolerating her current medications without any adverse effects.    Her daughter Bessy is present and assists in providing history. Bessy reports that her mother seems anxious and at times depressed. The patient is open to raising her dose of Zoloft from 100 mg to 150 mg daily.     She takes trazodone which helps her to sleep.      Her A1c and LDL-Cholesterol have historically been well controlled. We agreed to update needed labs.   BP appears satisfactorily controlled.     She continues on Evista for osteoporosis.       Current providers sharing in care for this patient include:   Patient Care Team:  Declan Schmitt MD as PCP - General (Internal Medicine)  Gita Espinoza MD as Assigned OBGYN Provider  Declan Schmitt MD as Assigned PCP    The following health maintenance items are reviewed in Epic and correct as of today:  Health Maintenance   Topic Date Due     ASTHMA ACTION PLAN  Never done     DTAP/TDAP/TD IMMUNIZATION (2 - Td or Tdap) 06/20/2022     EYE EXAM  01/01/2023     MEDICARE ANNUAL WELLNESS VISIT  03/14/2023     LIPID  03/31/2023     MICROALBUMIN  03/31/2023     TSH W/FREE T4 REFLEX  03/31/2023     DIABETIC FOOT EXAM  04/07/2023     ANNUAL REVIEW OF HM ORDERS  04/07/2023     A1C  04/11/2023     BMP  10/11/2023     JENNY ASSESSMENT  10/11/2023     ASTHMA CONTROL TEST  11/10/2023     PHQ-9  11/10/2023     FALL RISK ASSESSMENT  05/10/2024     ADVANCE CARE PLANNING  03/15/2027     DEPRESSION ACTION PLAN  Completed     INFLUENZA VACCINE  Completed     Pneumococcal Vaccine: 65+ Years  Completed     ZOSTER IMMUNIZATION  Completed     COVID-19 Vaccine  Completed     IPV IMMUNIZATION  Aged Out     MENINGITIS IMMUNIZATION  Aged Out        Patient has received both pneumonia vaccinations (Prevnar-13 and Pneumovax-23)     Mammogram Screening: Mammogram Screening - Patient over age 75, has elected to discontinue screenings.    Pertinent mammograms are reviewed under the imaging tab.    Review of Systems   Constitutional: Negative for chills and fever.   HENT: Positive for ear pain and hearing loss. Negative for congestion and sore throat.    Eyes: Positive for visual disturbance. Negative for pain.   Respiratory: Positive for shortness of breath. Negative for cough.    Cardiovascular: Negative for chest pain, palpitations and peripheral  "edema.   Gastrointestinal: Negative for abdominal pain, constipation, diarrhea, heartburn, hematochezia and nausea.   Genitourinary: Negative for dysuria, frequency, genital sores, hematuria and urgency.   Musculoskeletal: Positive for arthralgias. Negative for joint swelling and myalgias.   Skin: Negative for rash.   Neurological: Positive for dizziness, weakness and headaches. Negative for paresthesias.   Psychiatric/Behavioral: Positive for mood changes. The patient is nervous/anxious.        OBJECTIVE:   /84 (BP Location: Right arm, Patient Position: Sitting, Cuff Size: Adult Regular)   Pulse 95   Temp 98  F (36.7  C) (Oral)   Resp 16   Ht 1.638 m (5' 4.5\")   Wt 76.2 kg (168 lb)   LMP  (LMP Unknown)   SpO2 96%   Breastfeeding No   BMI 28.39 kg/m   Estimated body mass index is 28.39 kg/m  as calculated from the following:    Height as of this encounter: 1.638 m (5' 4.5\").    Weight as of this encounter: 76.2 kg (168 lb).     Physical Exam  GENERAL: healthy, alert and no distress  EYES: Eyes grossly normal to inspection, PERRL and conjunctivae and sclerae normal  HENT: ear canals and TM's normal, nose and mouth without ulcers or lesions  NECK: no adenopathy, no asymmetry, masses, or scars and thyroid normal to palpation  RESP: lungs clear to auscultation - no rales, rhonchi or wheezes  BREAST/PELVIC: exams not performed.  CV: regular rate and rhythm, normal S1 S2, no S3 or S4, no murmur, click or rub, no peripheral edema and peripheral pulses strong  ABDOMEN: soft, nontender, no hepatosplenomegaly, no masses and bowel sounds normal  MS: no gross musculoskeletal defects noted, no edema  SKIN: no suspicious lesions or rashes  NEURO: Normal strength and tone, mentation intact and speech normal  PSYCH: mentation appears normal, affect normal/bright  Foot Exam: normal DP and PT pulses, no trophic changes or ulcerative lesions, normal sensory exam and normal monofilament exam    Diagnostic Test " Results:  Labs reviewed in Epic    ASSESSMENT / PLAN:   (Z00.00) Encounter for Medicare annual wellness exam  (primary encounter diagnosis)  Comment: Stable health. See epic orders.     (E03.9) Acquired hypothyroidism  Comment: Euthyroid clinically. Update TSH. Continue current meds.   Plan: levothyroxine (SYNTHROID/LEVOTHROID) 75 MCG         tablet, OFFICE/OUTPT VISIT,EST,LEVL IV          (E04.1) Thyroid nodule  Plan: TSH WITH FREE T4 REFLEX          (E11.9) Type 2 diabetes mellitus without complication, without long-term current use of insulin (H)  Comment: Historically well controlled. Continue current meds.   Plan: Albumin Random Urine Quantitative with Creat         Ratio, TSH WITH FREE T4 REFLEX, HEMOGLOBIN A1C,        PRIMARY CARE FOLLOW-UP SCHEDULING, OFFICE/OUTPT        VISIT,EST,LEVL IV, FOOT EXAM          (I10) Benign essential hypertension  Comment: BP at target. Continue current meds.   Plan: potassium chloride ER (KLOR-CON) 20 MEQ CR         tablet, OFFICE/OUTPT VISIT,EST,LEVL IV,         DISCONTINUED: potassium chloride ER (KLOR-CON)         20 MEQ CR tablet          (E78.5) Hyperlipidemia LDL goal <100  Comment: Update lipids. Continue current meds.   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 Will raise dose of sertraline.   Plan: sertraline (ZOLOFT) 100 MG tablet, OFFICE/OUTPT        VISIT,EST,LEVL IV          (R53.83) Fatigue, unspecified type  Plan: **CBC with platelets FUTURE 2mo          (M85.80) Osteopenia, unspecified location  Plan: raloxifene (EVISTA) 60 MG tablet, OFFICE/OUTPT         VISIT,EST,LEVL IV          (G47.00) Insomnia, unspecified type  Comment: Continue  "trazodone.   Plan: traZODone (DESYREL) 150 MG tablet, OFFICE/OUTPT        VISIT,EST,LEVL IV          (R06.09) Dyspnea on exertion  Likely due to deconditioning.     (H91.93) Bilateral hearing loss, unspecified hearing loss type  Comment: Offered ENT referral.   Plan: Adult ENT  Referral          (H38.9304) Exudative age-related macular degeneration of right eye with inactive scar (H)  Comment: Continue current measures and ophthalmology f/u.     Patient has been advised of split billing requirements and indicates understanding: Yes      COUNSELING:  Reviewed preventive health counseling, as reflected in patient instructions      BMI:   Estimated body mass index is 28.39 kg/m  as calculated from the following:    Height as of this encounter: 1.638 m (5' 4.5\").    Weight as of this encounter: 76.2 kg (168 lb).         She reports that she quit smoking about 48 years ago. Her smoking use included cigarettes. She has never used smokeless tobacco.      Appropriate preventive services were discussed with this patient, including applicable screening as appropriate for cardiovascular disease, diabetes, osteopenia/osteoporosis, and glaucoma.  As appropriate for age/gender, discussed screening for colorectal cancer, prostate cancer, breast cancer, and cervical cancer. Checklist reviewing preventive services available has been given to the patient.    Reviewed patients plan of care and provided an AVS. The Basic Care Plan (routine screening as documented in Health Maintenance) for Lata meets the Care Plan requirement. This Care Plan has been established and reviewed with the Patient.      Declan Schmitt MD,   [facility]    Identified Health Risks:  Answers for HPI/ROS submitted by the patient on 5/10/2023  If you checked off any problems, how difficult have these problems made it for you to do your work, take care of things at home, or get along with other people?: Extremely difficult  PHQ9 " TOTAL SCORE: 21      Patient Instructions     Everything looks fine!    Refills of medications have been faxed to your pharmacy.     [facility]    See me in six months, sooner if problems.                 The patient was counseled and encouraged to consider modifying their diet and eating habits. She was provided with information on recommended healthy diet options.  The patient was provided with written information regarding signs of hearing loss.  Information on urinary incontinence and treatment options given to patient.  She is at risk for falling and has been provided with information to reduce the risk of falling at home.

## 2023-05-11 LAB
ALBUMIN SERPL BCG-MCNC: 4.2 G/DL (ref 3.5–5.2)
ALP SERPL-CCNC: 95 U/L (ref 35–104)
ALT SERPL W P-5'-P-CCNC: 14 U/L (ref 10–35)
ANION GAP SERPL CALCULATED.3IONS-SCNC: 16 MMOL/L (ref 7–15)
AST SERPL W P-5'-P-CCNC: 26 U/L (ref 10–35)
BILIRUB SERPL-MCNC: 0.3 MG/DL
BUN SERPL-MCNC: 14 MG/DL (ref 8–23)
CALCIUM SERPL-MCNC: 10.3 MG/DL (ref 8.8–10.2)
CHLORIDE SERPL-SCNC: 105 MMOL/L (ref 98–107)
CREAT SERPL-MCNC: 0.65 MG/DL (ref 0.51–0.95)
DEPRECATED HCO3 PLAS-SCNC: 21 MMOL/L (ref 22–29)
GFR SERPL CREATININE-BSD FRML MDRD: 84 ML/MIN/1.73M2
GLUCOSE SERPL-MCNC: 126 MG/DL (ref 70–99)
POTASSIUM SERPL-SCNC: 4.1 MMOL/L (ref 3.4–5.3)
PROT SERPL-MCNC: 7.7 G/DL (ref 6.4–8.3)
SODIUM SERPL-SCNC: 142 MMOL/L (ref 136–145)
TSH SERPL DL<=0.005 MIU/L-ACNC: 1.95 UIU/ML (ref 0.3–4.2)

## 2023-05-14 ENCOUNTER — TELEPHONE (OUTPATIENT)
Dept: INTERNAL MEDICINE | Facility: CLINIC | Age: 88
End: 2023-05-14
Payer: COMMERCIAL

## 2023-05-22 ENCOUNTER — TRANSFERRED RECORDS (OUTPATIENT)
Dept: HEALTH INFORMATION MANAGEMENT | Facility: CLINIC | Age: 88
End: 2023-05-22
Payer: COMMERCIAL

## 2023-05-22 LAB — RETINOPATHY: NEGATIVE

## 2023-05-25 ENCOUNTER — TRANSFERRED RECORDS (OUTPATIENT)
Dept: HEALTH INFORMATION MANAGEMENT | Facility: CLINIC | Age: 88
End: 2023-05-25
Payer: COMMERCIAL

## 2023-08-22 ENCOUNTER — HOSPITAL ENCOUNTER (OUTPATIENT)
Dept: WOUND CARE | Facility: CLINIC | Age: 88
Discharge: HOME | End: 2023-08-22
Attending: PHYSICIAN ASSISTANT | Admitting: PHYSICIAN ASSISTANT
Payer: COMMERCIAL

## 2023-08-22 DIAGNOSIS — L30.9 DERMATITIS: Primary | ICD-10-CM

## 2023-08-22 PROCEDURE — 99213 OFFICE O/P EST LOW 20 MIN: CPT | Performed by: PHYSICIAN ASSISTANT

## 2023-08-22 PROCEDURE — G0463 HOSPITAL OUTPT CLINIC VISIT: HCPCS

## 2023-08-22 NOTE — DISCHARGE INSTRUCTIONS
Lata F Alpers      10/29/1934    A DME order was not completed because supplies were not needed    Dressing changes outside of clinic are being performed by  Patient and family     Wound care recommendations gluteus/sacrum: Daily  -Apply sween cream to irritated areas (use lotion right after bathing so the soap doesn't dry out your skin)   -can use cortizone cream if it appears inflamed  -Apply daily or as needed  -Can try using silky undergarments to help lessen friction of your skin.     Can try to use Dycem matting placed underneath where you sit in your recliner or Wheelchair to help from slipping.   Can use a lumbar support as well to help from sliding down in your chair during the day.    Can look into toe splints to space your toe on your left foot so prevent rubbing.     Continue to off load the buttocks, side to side in chair, use pillows, try and get up out of chair every hour  Discoloration will fade over time  Skin care all over body:  - Use unscented, hypoallergenic cleanser such as CeraVe, Dove or Cetaphil  - Apply greasy moisturizer (Aquaphor, CeraVe ointment, etc) immediately after  toweling off.  - Bathe 2-4 times a week using tepid (not hot) water  - Use humidifier in bedroom  - Stay hydrated  Wound Clinic follow up in the future if there are any wound concerns       Joyce Anderson PA-C August 22, 2023    Call us at [phone] if you have any questions about your wounds, have redness or swelling around your wound, have a fever of 101 degrees Fahrenheit or greater or if you have any other problems or concerns. We answer the phone Monday through Friday 8 am to 4 pm, please leave a message as we check the voicemail frequently throughout the day.     If you had a positive experience please indicate that on your patient satisfaction survey form that  [email or web address] [facility] will be sending you.    It was a pleasure meeting with you today.  Thank you for allowing me and my team the privilege of caring  for you today.  YOU are the reason we are here, and I truly hope we provided you with the excellent service you deserve.  Please let us know if there is anything else we can do for you so that we can be sure you are leaving completely satisfied with your care experience.      If you have any billing related questions please call the [facility] Business office at [phone]. The clinic staff does not handle billing related matters.    If you are scheduled to have a follow up appointment, you will receive a reminder call the day before your visit. On the appointment day please arrive 15 minutes prior to your appointment time. If you are unable to keep that appointment, please call the clinic to cancel or reschedule. If you are more than 10 minutes late or greater for your scheduled appointment time, the clinic policy is that you may be asked to reschedule.

## 2023-08-22 NOTE — PROGRESS NOTES
"[facility]    ASSESSMENT:   Incontinence associated dermatitis    PLAN/DISCUSSION:   Apply Sween daily (or other dimethicone based ointment)   Avoid slouching in recliner, we discussed using a sticky pad on chair to avoid slouching and consider silkier underwear  Avoid sitz baths, if using a sitz bath use warm water and liberally moisturize immediately after patting dry  Unable to safely bathe herself or set up meds due to blindness, home health aid order submitted  Cleanse with Vernell or Noleo, apply clear barrier AF ointment. If rash/irritation returns apply TAC 0.1% oint once-twice daily until rash resolved (no longer than a week)  Attempt to air out area, possibly sleep on chux at night   Reposition frequently, avoid sleeping in recliner  See bottom of note for detailed wound care and patient instructions    HISTORY OF PRESENT ILLNESS:   Norma F Alpers is a 88 year old female with urinary incontinence and IBS with diarrhea who presents today for rash and soreness over her sacrum. She has been dealing with this for years. Has tried several different ointments - lidocaine/nifedipine, zinc oxide and essential oils. Noted that a \"gel pad\" in the hospital was helpful.     Initially we had put her on a regimen of TAC and antifungal ointment and this greatly improved the rash. Today she returns and the area appears improved but she feels that it is more sore and \"chapped.\" She has continued to sleep in her chair at night. Continues with sitz baths as she feels that they are soothing     VITALS: LMP  (LMP Unknown)      PHYSICAL EXAM:  GENERAL: Patient is alert and oriented and in no acute distress  INTEGUMENTARY: skin is smooth and intact but has purplish discoloration, likely both mild bruising and post inflammatory hyperpigmentation      MDM: 20 minutes was spent on the day of visit reviewing previous chart notes, assessing the patient and developing the plan of care, this excludes time spent on " any procedures.     PATIENT INSTRUCTIONS      Further instructions from your care team         Lata MARIO Alpers      10/29/1934    A DME order was not completed because supplies were not needed    Dressing changes outside of clinic are being performed by  Patient and family     Wound care recommendations gluteus/sacrum: Daily  -Apply sween cream to irritated areas (use lotion right after bathing so the soap doesn't dry out your skin)   -can use cortizone cream if it appears inflamed  -Apply daily or as needed  -Can try using silky undergarments to help lessen friction of your skin.     Can try to use Dycem matting placed underneath where you sit in your recliner or Wheelchair to help from slipping.   Can use a lumbar support as well to help from sliding down in your chair during the day.    Can look into toe splints to space your toe on your left foot so prevent rubbing.     Continue to off load the buttocks, side to side in chair, use pillows, try and get up out of chair every hour  Discoloration will fade over time  Skin care all over body:  - Use unscented, hypoallergenic cleanser such as CeraVe, Dove or Cetaphil  - Apply greasy moisturizer (Aquaphor, CeraVe ointment, etc) immediately after  toweling off.  - Bathe 2-4 times a week using tepid (not hot) water  - Use humidifier in bedroom  - Stay hydrated  Wound Clinic follow up in the future if there are any wound concerns       Joyce Anderson PA-C August 22, 2023    Call us at [phone] if you have any questions about your wounds, have redness or swelling around your wound, have a fever of 101 degrees Fahrenheit or greater or if you have any other problems or concerns. We answer the phone Monday through Friday 8 am to 4 pm, please leave a message as we check the voicemail frequently throughout the day.     If you had a positive experience please indicate that on your patient satisfaction survey form that [facility] will be sending you.    It was a  pleasure meeting with you today.  Thank you for allowing me and my team the privilege of caring for you today.  YOU are the reason we are here, and I truly hope we provided you with the excellent service you deserve.  Please let us know if there is anything else we can do for you so that we can be sure you are leaving completely satisfied with your care experience.      If you have any billing related questions please call the [facility] Business office at [phone]. The clinic staff does not handle billing related matters.    If you are scheduled to have a follow up appointment, you will receive a reminder call the day before your visit. On the appointment day please arrive 15 minutes prior to your appointment time. If you are unable to keep that appointment, please call the clinic to cancel or reschedule. If you are more than 10 minutes late or greater for your scheduled appointment time, the clinic policy is that you may be asked to reschedule.

## 2023-09-12 DIAGNOSIS — L30.9 DERMATITIS: ICD-10-CM

## 2023-09-12 RX ORDER — TRIAMCINOLONE ACETONIDE 1 MG/G
OINTMENT TOPICAL 2 TIMES DAILY
Qty: 80 G | Refills: 3 | Status: SHIPPED | OUTPATIENT
Start: 2023-09-12

## 2023-09-18 ENCOUNTER — TELEPHONE (OUTPATIENT)
Dept: INTERNAL MEDICINE | Facility: CLINIC | Age: 88
End: 2023-09-18
Payer: COMMERCIAL

## 2023-09-18 NOTE — TELEPHONE ENCOUNTER
Reason for Call:  Appointment Request    Patient requesting this type of appt: Chronic Diease Management/Medication/Follow-Up    Requested provider: Declan Schmitt    Reason patient unable to be scheduled: Not within requested timeframe    When does patient want to be seen/preferred time:  as soon as possible     Comments: daughter calling to get pt worked into a appointment for a assistant living assessment as soon as possible . Pt is moving asap she needs this before move in day .     Okay to leave a detailed message?: Yes at Other phone number:  Bessy daughter [phone]    Call taken on 9/18/2023 at 8:13 AM by Valentín Arroyo

## 2023-09-21 ENCOUNTER — OFFICE VISIT (OUTPATIENT)
Dept: INTERNAL MEDICINE | Facility: CLINIC | Age: 88
End: 2023-09-21
Payer: COMMERCIAL

## 2023-09-21 ENCOUNTER — MEDICAL CORRESPONDENCE (OUTPATIENT)
Dept: HEALTH INFORMATION MANAGEMENT | Facility: CLINIC | Age: 88
End: 2023-09-21

## 2023-09-21 VITALS
TEMPERATURE: 98.4 F | BODY MASS INDEX: 28.2 KG/M2 | RESPIRATION RATE: 18 BRPM | HEIGHT: 64 IN | WEIGHT: 165.2 LBS | HEART RATE: 99 BPM | OXYGEN SATURATION: 94 % | DIASTOLIC BLOOD PRESSURE: 72 MMHG | SYSTOLIC BLOOD PRESSURE: 135 MMHG

## 2023-09-21 DIAGNOSIS — Z78.9 DECREASED ACTIVITIES OF DAILY LIVING (ADL): Primary | ICD-10-CM

## 2023-09-21 PROCEDURE — 99214 OFFICE O/P EST MOD 30 MIN: CPT

## 2023-09-21 ASSESSMENT — PATIENT HEALTH QUESTIONNAIRE - PHQ9
SUM OF ALL RESPONSES TO PHQ QUESTIONS 1-9: 16
10. IF YOU CHECKED OFF ANY PROBLEMS, HOW DIFFICULT HAVE THESE PROBLEMS MADE IT FOR YOU TO DO YOUR WORK, TAKE CARE OF THINGS AT HOME, OR GET ALONG WITH OTHER PEOPLE: SOMEWHAT DIFFICULT
SUM OF ALL RESPONSES TO PHQ QUESTIONS 1-9: 16

## 2023-09-21 ASSESSMENT — ASTHMA QUESTIONNAIRES: ACT_TOTALSCORE: 16

## 2023-09-21 ASSESSMENT — PAIN SCALES - GENERAL: PAINLEVEL: NO PAIN (0)

## 2023-09-21 NOTE — PROGRESS NOTES
Assessment & Plan     (Z78.9) Decreased activities of daily living (ADL)  (primary encounter diagnosis)  Comment: Patient presents to the clinic accompanied by daughter and grandson.  Their main goal today is to fill out paperwork for admission to assisted living.  The patient will be living in the same building but requiring more cares from the staff.  She has a hard time with meals and providing herself the nutrition that she needs therefore the assisted living aspect will provide her meals 3 times a day.  The patient can manage her medications on her own and is able to toilet and shower on her own as well she would also like some help with changing her bed sheets as well as cleaning her apartment.  Paperwork was filled out.  And a referral to care coordination will be placed in the event that they need more assistance regarding the assisted living transition.  A POLST form was also filled out and is on file.  Plan: Primary Care - Care Coordination Referral        Paperwork was filled out was put into her chart and was also given back to the patient before leaving the facility.      30 minutes spent by me on the date of the encounter doing chart review, review of outside records, interpretation of tests, patient visit, documentation, and discussion with family            PEPPER Staton CNP  [facility]    Jarod Guido is a 88 year old, presenting for the following health issues: Pres home and services home. Right now she is paying for independent living and hoping for mid October. She is now in a two bedroom and is having to move to a one bedroom. She will be on the first floor.   Ambulation: has some troubles but uses a walker.   Diet: hard to cook for herself. She is primarily needing the meal support   Medications: She manages her own. Uses a medication tracker case.   Memory: it comes and goes.   Toileting: no issues- no incontinence.   Hygiene: walk in shower, independently  "and has a shower chair.   Forms        9/21/2023     2:15 PM   Additional Questions   Roomed by ramses   Accompanied by grandson and daughter         9/21/2023     2:15 PM   Patient Reported Additional Medications   Patient reports taking the following new medications none       History of Present Illness       Reason for visit:  Assessment    She eats 0-1 servings of fruits and vegetables daily.She consumes 1 sweetened beverage(s) daily.She exercises with enough effort to increase her heart rate 9 or less minutes per day.  She exercises with enough effort to increase her heart rate 3 or less days per week.   She is taking medications regularly.                 Review of Systems   Constitutional, HEENT, cardiovascular, pulmonary, gi and gu systems are negative, except as otherwise noted.      Objective    /72 (BP Location: Right arm, Patient Position: Sitting, Cuff Size: Adult Regular)   Pulse 99   Temp 98.4  F (36.9  C) (Oral)   Resp 18   Ht 1.626 m (5' 4\")   Wt 74.9 kg (165 lb 3.2 oz)   LMP  (LMP Unknown)   SpO2 94%   BMI 28.36 kg/m    Body mass index is 28.36 kg/m .  Physical Exam   GENERAL: healthy, alert and no distress  NECK: no adenopathy, no asymmetry, masses, or scars and thyroid normal to palpation  RESP: lungs clear to auscultation - no rales, rhonchi or wheezes  CV: regular rate and rhythm, normal S1 S2, no S3 or S4, no murmur, click or rub, no peripheral edema and peripheral pulses strong  ABDOMEN: soft, nontender, no hepatosplenomegaly, no masses and bowel sounds normal  MS: no gross musculoskeletal defects noted, no edema                      "

## 2023-09-26 ENCOUNTER — PATIENT OUTREACH (OUTPATIENT)
Dept: CARE COORDINATION | Facility: CLINIC | Age: 88
End: 2023-09-26
Payer: COMMERCIAL

## 2023-09-26 NOTE — PROGRESS NOTES
Clinic Care Coordination Contact  Community Health Worker Initial Outreach    CHW Initial Information Gathering:  Referral Source: PCP  Current living arrangement:: I live alone  Type of residence:: Independent Senior Living (Patient will be moving to an Assisted Living Facility apartment within the same building.)  Informal Support system:: Children, Family  No PCP office visit in Past Year: No  Transportation means:: Family       Patient accepts CC: No, Patient Declined. Patient will be sent Care Coordination introduction letter for future reference.     9-26, CHW:     CHW was able to connect with the Patient and introduce self/care coordination and intent of call. Patient shared that she is going to have a Nursing Assessment by the facility to help determine the need for Assisted Living Services. Patient shares that she will get 2 meals per day in the [facility] apartment.     The patient had no questions or concerns for the Writer. Encouraged the patient to reach out to CC in the future if needed.       Tania MESSINA Public Health  Community Health Worker  [place], [place] [facility]  Clinic Care Coordination  [phone]

## 2023-09-26 NOTE — LETTER
[facility] CARE COORDINATION  [facility]  September 26, 2023    Norma F Alpers  [address]   [place] [address]      Dear Lata,        I am a  clinic community health worker who works with Declan Schmitt MD, MD with the [facility]. I wanted to thank you for spending the time to talk with me.  Below is a description of clinic care coordination and how I can further assist you.       The clinic care coordination team is made up of a registered nurse, , financial resource worker and community health worker who understand the health care system. The goal of clinic care coordination is to help you manage your health and improve access to the health care system. Our team works alongside your provider to assist you in determining your health and social needs. We can help you obtain health care and community resources, providing you with necessary information and education. We can work with you through any barriers and develop a care plan that helps coordinate and strengthen the communication between you and your care team.  Our services are voluntary and are offered without charge to you personally.    Please feel free to contact me with any questions or concerns regarding care coordination and what we can offer.      We are focused on providing you with the highest-quality healthcare experience possible.    Sincerely,       Tania ESTEVES. Public Health  Community Health Worker  [facility]  Clinic Care Coordination  [phone]

## 2023-09-27 ENCOUNTER — DOCUMENTATION ONLY (OUTPATIENT)
Dept: OTHER | Facility: CLINIC | Age: 88
End: 2023-09-27
Payer: COMMERCIAL

## 2023-10-06 ENCOUNTER — TELEPHONE (OUTPATIENT)
Dept: INTERNAL MEDICINE | Facility: CLINIC | Age: 88
End: 2023-10-06
Payer: COMMERCIAL

## 2023-10-06 NOTE — TELEPHONE ENCOUNTER
PHYSICAL THERAPY / OCCUPATIONAL THERAPY/ SKILLED NURSING orders received via fax. Form in your mailbox to be signed.

## 2023-10-12 ENCOUNTER — TELEPHONE (OUTPATIENT)
Dept: INTERNAL MEDICINE | Facility: CLINIC | Age: 88
End: 2023-10-12
Payer: COMMERCIAL

## 2023-10-13 ENCOUNTER — TELEPHONE (OUTPATIENT)
Dept: INTERNAL MEDICINE | Facility: CLINIC | Age: 88
End: 2023-10-13
Payer: COMMERCIAL

## 2023-10-14 DIAGNOSIS — I10 BENIGN ESSENTIAL HYPERTENSION: ICD-10-CM

## 2023-10-16 RX ORDER — AMLODIPINE BESYLATE 5 MG/1
TABLET ORAL
Qty: 90 TABLET | Refills: 0 | Status: SHIPPED | OUTPATIENT
Start: 2023-10-16 | End: 2023-11-29

## 2023-10-20 ENCOUNTER — MEDICAL CORRESPONDENCE (OUTPATIENT)
Dept: HEALTH INFORMATION MANAGEMENT | Facility: CLINIC | Age: 88
End: 2023-10-20

## 2023-10-27 ENCOUNTER — TELEPHONE (OUTPATIENT)
Dept: INTERNAL MEDICINE | Facility: CLINIC | Age: 88
End: 2023-10-27
Payer: COMMERCIAL

## 2023-10-27 NOTE — TELEPHONE ENCOUNTER
Donna with [facility] calls for verbal order      Delayed start of care until 11-3-2023     Call back number  [phone] ok to leave detailed message on secure line

## 2023-11-03 ENCOUNTER — TELEPHONE (OUTPATIENT)
Dept: INTERNAL MEDICINE | Facility: CLINIC | Age: 88
End: 2023-11-03
Payer: COMMERCIAL

## 2023-11-03 NOTE — TELEPHONE ENCOUNTER
Skye with [facility] calls to go over medication list with RN . Thank you       Ph: [phone]

## 2023-11-06 ENCOUNTER — TELEPHONE (OUTPATIENT)
Dept: INTERNAL MEDICINE | Facility: CLINIC | Age: 88
End: 2023-11-06
Payer: COMMERCIAL

## 2023-11-06 NOTE — TELEPHONE ENCOUNTER
10/25/23 and 10/27/23 SKILLED NURSING and start of care orders received via fax. Form in your mailbox to be signed.

## 2023-11-07 ENCOUNTER — TELEPHONE (OUTPATIENT)
Dept: INTERNAL MEDICINE | Facility: CLINIC | Age: 88
End: 2023-11-07
Payer: COMMERCIAL

## 2023-11-07 NOTE — TELEPHONE ENCOUNTER
Left message on Skye's voicemail asking her to return call to clinic to review med list.  TENZIN Pinzon R.N.

## 2023-11-07 NOTE — TELEPHONE ENCOUNTER
SKILLED NURSING / PHYSICAL THERAPY / OCCUPATIONAL THERAPY orders received via fax. Form in your mailbox to be signed.

## 2023-11-08 ENCOUNTER — MEDICAL CORRESPONDENCE (OUTPATIENT)
Dept: HEALTH INFORMATION MANAGEMENT | Facility: CLINIC | Age: 88
End: 2023-11-08

## 2023-11-08 ENCOUNTER — TELEPHONE (OUTPATIENT)
Dept: INTERNAL MEDICINE | Facility: CLINIC | Age: 88
End: 2023-11-08
Payer: COMMERCIAL

## 2023-11-08 DIAGNOSIS — Z53.9 DIAGNOSIS NOT YET DEFINED: Primary | ICD-10-CM

## 2023-11-08 PROCEDURE — G0180 MD CERTIFICATION HHA PATIENT: HCPCS | Performed by: INTERNAL MEDICINE

## 2023-11-08 NOTE — TELEPHONE ENCOUNTER
Start of care referral did not include current med list. Skye from [facility] wanted to review patient's current med list--done by RN.      Skye from [facility] needs approval for start of care:  skilled nurse visits once a week x4 wks.  TENZIN Pinzon R.N.

## 2023-11-14 ENCOUNTER — LAB REQUISITION (OUTPATIENT)
Dept: LAB | Facility: CLINIC | Age: 88
End: 2023-11-14
Payer: COMMERCIAL

## 2023-11-14 DIAGNOSIS — I10 ESSENTIAL (PRIMARY) HYPERTENSION: ICD-10-CM

## 2023-11-14 DIAGNOSIS — E03.9 HYPOTHYROIDISM, UNSPECIFIED: ICD-10-CM

## 2023-11-14 DIAGNOSIS — E11.9 TYPE 2 DIABETES MELLITUS WITHOUT COMPLICATIONS (H): ICD-10-CM

## 2023-11-15 LAB
ANION GAP SERPL CALCULATED.3IONS-SCNC: 11 MMOL/L (ref 7–15)
BASOPHILS # BLD AUTO: 0 10E3/UL (ref 0–0.2)
BASOPHILS NFR BLD AUTO: 1 %
BUN SERPL-MCNC: 20.5 MG/DL (ref 8–23)
CALCIUM SERPL-MCNC: 9.4 MG/DL (ref 8.8–10.2)
CHLORIDE SERPL-SCNC: 106 MMOL/L (ref 98–107)
CREAT SERPL-MCNC: 0.54 MG/DL (ref 0.51–0.95)
DEPRECATED HCO3 PLAS-SCNC: 23 MMOL/L (ref 22–29)
EGFRCR SERPLBLD CKD-EPI 2021: 88 ML/MIN/1.73M2
EOSINOPHIL # BLD AUTO: 0.2 10E3/UL (ref 0–0.7)
EOSINOPHIL NFR BLD AUTO: 3 %
ERYTHROCYTE [DISTWIDTH] IN BLOOD BY AUTOMATED COUNT: 15 % (ref 10–15)
GLUCOSE SERPL-MCNC: 123 MG/DL (ref 70–99)
HBA1C MFR BLD: 7 %
HCT VFR BLD AUTO: 43.5 % (ref 35–47)
HGB BLD-MCNC: 13.7 G/DL (ref 11.7–15.7)
IMM GRANULOCYTES # BLD: 0 10E3/UL
IMM GRANULOCYTES NFR BLD: 1 %
LYMPHOCYTES # BLD AUTO: 2.4 10E3/UL (ref 0.8–5.3)
LYMPHOCYTES NFR BLD AUTO: 28 %
MCH RBC QN AUTO: 29.3 PG (ref 26.5–33)
MCHC RBC AUTO-ENTMCNC: 31.5 G/DL (ref 31.5–36.5)
MCV RBC AUTO: 93 FL (ref 78–100)
MONOCYTES # BLD AUTO: 0.6 10E3/UL (ref 0–1.3)
MONOCYTES NFR BLD AUTO: 8 %
NEUTROPHILS # BLD AUTO: 5 10E3/UL (ref 1.6–8.3)
NEUTROPHILS NFR BLD AUTO: 59 %
NRBC # BLD AUTO: 0 10E3/UL
NRBC BLD AUTO-RTO: 0 /100
PLATELET # BLD AUTO: 216 10E3/UL (ref 150–450)
POTASSIUM SERPL-SCNC: 4.1 MMOL/L (ref 3.4–5.3)
RBC # BLD AUTO: 4.67 10E6/UL (ref 3.8–5.2)
SODIUM SERPL-SCNC: 140 MMOL/L (ref 135–145)
TSH SERPL DL<=0.005 MIU/L-ACNC: 1.74 UIU/ML (ref 0.3–4.2)
WBC # BLD AUTO: 8.3 10E3/UL (ref 4–11)

## 2023-11-15 PROCEDURE — P9603 ONE-WAY ALLOW PRORATED MILES: HCPCS | Mod: ORL | Performed by: NURSE PRACTITIONER

## 2023-11-15 PROCEDURE — 83036 HEMOGLOBIN GLYCOSYLATED A1C: CPT | Mod: ORL | Performed by: NURSE PRACTITIONER

## 2023-11-15 PROCEDURE — 84443 ASSAY THYROID STIM HORMONE: CPT | Mod: ORL | Performed by: NURSE PRACTITIONER

## 2023-11-15 PROCEDURE — 36415 COLL VENOUS BLD VENIPUNCTURE: CPT | Mod: ORL | Performed by: NURSE PRACTITIONER

## 2023-11-15 PROCEDURE — 85025 COMPLETE CBC W/AUTO DIFF WBC: CPT | Mod: ORL | Performed by: NURSE PRACTITIONER

## 2023-11-15 PROCEDURE — 80048 BASIC METABOLIC PNL TOTAL CA: CPT | Mod: ORL | Performed by: NURSE PRACTITIONER

## 2023-11-28 ENCOUNTER — TELEPHONE (OUTPATIENT)
Dept: WOUND CARE | Facility: CLINIC | Age: 88
End: 2023-11-28
Payer: COMMERCIAL

## 2023-11-29 DIAGNOSIS — I10 BENIGN ESSENTIAL HYPERTENSION: ICD-10-CM

## 2023-11-29 RX ORDER — AMLODIPINE BESYLATE 5 MG/1
TABLET ORAL
Qty: 90 TABLET | Refills: 1 | Status: SHIPPED | OUTPATIENT
Start: 2023-11-29

## 2023-11-29 NOTE — TELEPHONE ENCOUNTER
Attempted to call the patient for rescheduling. There was no answer and patients vm inbox was full

## 2023-12-04 NOTE — TELEPHONE ENCOUNTER
Made second and final attempt to reach patient to reschedule. Writer was unable to leave a vm as patient's inbox was still full.

## 2024-01-02 ENCOUNTER — LAB REQUISITION (OUTPATIENT)
Dept: LAB | Facility: CLINIC | Age: 89
End: 2024-01-02
Payer: COMMERCIAL

## 2024-01-02 DIAGNOSIS — I10 ESSENTIAL (PRIMARY) HYPERTENSION: ICD-10-CM

## 2024-01-03 LAB
ANION GAP SERPL CALCULATED.3IONS-SCNC: 13 MMOL/L (ref 7–15)
BUN SERPL-MCNC: 14.2 MG/DL (ref 8–23)
CALCIUM SERPL-MCNC: 9.5 MG/DL (ref 8.8–10.2)
CHLORIDE SERPL-SCNC: 104 MMOL/L (ref 98–107)
CREAT SERPL-MCNC: 0.57 MG/DL (ref 0.51–0.95)
DEPRECATED HCO3 PLAS-SCNC: 24 MMOL/L (ref 22–29)
EGFRCR SERPLBLD CKD-EPI 2021: 86 ML/MIN/1.73M2
GLUCOSE SERPL-MCNC: 167 MG/DL (ref 70–99)
POTASSIUM SERPL-SCNC: 4.1 MMOL/L (ref 3.4–5.3)
SODIUM SERPL-SCNC: 141 MMOL/L (ref 135–145)

## 2024-01-03 PROCEDURE — 80048 BASIC METABOLIC PNL TOTAL CA: CPT | Mod: ORL | Performed by: FAMILY MEDICINE

## 2024-01-03 PROCEDURE — P9603 ONE-WAY ALLOW PRORATED MILES: HCPCS | Mod: ORL | Performed by: FAMILY MEDICINE

## 2024-01-03 PROCEDURE — 36415 COLL VENOUS BLD VENIPUNCTURE: CPT | Mod: ORL | Performed by: FAMILY MEDICINE

## 2024-02-04 NOTE — ED NOTES
Bed: ED26  Expected date:   Expected time:   Means of arrival:   Comments:  Room 33  
Pt ambulated to bathroom. Pt is significantly unsteady but would be able to ambulate with a cane-- pt states that she owns one but dislikes using it.  
Pt ring on R. Finger #4 was cut off using a ring cutter on RN's trauma hermes. Small (<1cm) abrasion noted to top of knuckle while pulling ring off after it was cut. Bacitracin and band-aid applied to finger. Pt tolerated well.  
oral

## 2024-03-26 ENCOUNTER — LAB REQUISITION (OUTPATIENT)
Dept: LAB | Facility: CLINIC | Age: 89
End: 2024-03-26
Payer: COMMERCIAL

## 2024-03-26 DIAGNOSIS — E11.9 TYPE 2 DIABETES MELLITUS WITHOUT COMPLICATIONS (H): ICD-10-CM

## 2024-03-27 LAB — HBA1C MFR BLD: 7.3 %

## 2024-03-27 PROCEDURE — 83036 HEMOGLOBIN GLYCOSYLATED A1C: CPT | Mod: ORL | Performed by: NURSE PRACTITIONER

## 2024-03-27 PROCEDURE — P9603 ONE-WAY ALLOW PRORATED MILES: HCPCS | Mod: ORL | Performed by: NURSE PRACTITIONER

## 2024-03-27 PROCEDURE — 36415 COLL VENOUS BLD VENIPUNCTURE: CPT | Mod: ORL | Performed by: NURSE PRACTITIONER

## 2024-03-30 DIAGNOSIS — G47.00 INSOMNIA, UNSPECIFIED TYPE: ICD-10-CM

## 2024-04-01 RX ORDER — TRAZODONE HYDROCHLORIDE 150 MG/1
TABLET ORAL
Qty: 135 TABLET | Refills: 0 | Status: SHIPPED | OUTPATIENT
Start: 2024-04-01

## 2024-04-10 ENCOUNTER — PATIENT OUTREACH (OUTPATIENT)
Dept: CARE COORDINATION | Facility: CLINIC | Age: 89
End: 2024-04-10
Payer: COMMERCIAL

## 2024-04-24 ENCOUNTER — PATIENT OUTREACH (OUTPATIENT)
Dept: CARE COORDINATION | Facility: CLINIC | Age: 89
End: 2024-04-24
Payer: COMMERCIAL

## 2024-06-04 ENCOUNTER — TELEPHONE (OUTPATIENT)
Dept: INTERNAL MEDICINE | Facility: CLINIC | Age: 89
End: 2024-06-04
Payer: COMMERCIAL

## 2024-06-04 NOTE — TELEPHONE ENCOUNTER
Patient Quality Outreach    Patient is due for the following:   Diabetes -  Eye Exam, Microalbumin, Diabetic Follow-Up Visit, and Foot Exam  Asthma  -  ACT needed and AAP  Depression  -  PHQ-9 needed  Physical Annual Wellness Visit    Next Steps: Schedule an appointment for annual wellness, depression, diabetes and asthma follow up.      Type of outreach:    Sent letter.      Questions for provider review:               Lisbeth Rodriguez, CMA

## 2024-06-10 DIAGNOSIS — E03.9 ACQUIRED HYPOTHYROIDISM: ICD-10-CM

## 2024-06-10 RX ORDER — LEVOTHYROXINE SODIUM 75 UG/1
75 TABLET ORAL EVERY MORNING
Qty: 90 TABLET | Refills: 0 | Status: SHIPPED | OUTPATIENT
Start: 2024-06-10 | End: 2024-09-05

## 2024-08-05 ENCOUNTER — LAB REQUISITION (OUTPATIENT)
Dept: LAB | Facility: CLINIC | Age: 89
End: 2024-08-05
Payer: COMMERCIAL

## 2024-08-05 DIAGNOSIS — I10 ESSENTIAL (PRIMARY) HYPERTENSION: ICD-10-CM

## 2024-08-07 LAB
ANION GAP SERPL CALCULATED.3IONS-SCNC: 11 MMOL/L (ref 7–15)
BUN SERPL-MCNC: 16.5 MG/DL (ref 8–23)
CALCIUM SERPL-MCNC: 10 MG/DL (ref 8.8–10.4)
CHLORIDE SERPL-SCNC: 103 MMOL/L (ref 98–107)
CREAT SERPL-MCNC: 0.64 MG/DL (ref 0.51–0.95)
EGFRCR SERPLBLD CKD-EPI 2021: 84 ML/MIN/1.73M2
GLUCOSE SERPL-MCNC: 132 MG/DL (ref 70–99)
HCO3 SERPL-SCNC: 25 MMOL/L (ref 22–29)
POTASSIUM SERPL-SCNC: 4.2 MMOL/L (ref 3.4–5.3)
SODIUM SERPL-SCNC: 139 MMOL/L (ref 135–145)

## 2024-08-07 PROCEDURE — 36415 COLL VENOUS BLD VENIPUNCTURE: CPT | Mod: ORL | Performed by: NURSE PRACTITIONER

## 2024-08-07 PROCEDURE — P9604 ONE-WAY ALLOW PRORATED TRIP: HCPCS | Mod: ORL | Performed by: NURSE PRACTITIONER

## 2024-08-07 PROCEDURE — 80048 BASIC METABOLIC PNL TOTAL CA: CPT | Mod: ORL | Performed by: NURSE PRACTITIONER

## 2024-08-23 ENCOUNTER — TRANSFERRED RECORDS (OUTPATIENT)
Dept: HEALTH INFORMATION MANAGEMENT | Facility: CLINIC | Age: 89
End: 2024-08-23
Payer: COMMERCIAL

## 2024-08-23 LAB — RETINOPATHY: NEGATIVE

## 2024-09-05 DIAGNOSIS — E03.9 ACQUIRED HYPOTHYROIDISM: ICD-10-CM

## 2024-09-05 RX ORDER — LEVOTHYROXINE SODIUM 75 UG/1
TABLET ORAL
Qty: 90 TABLET | Refills: 3 | Status: SHIPPED | OUTPATIENT
Start: 2024-09-05

## 2024-10-15 ENCOUNTER — TELEPHONE (OUTPATIENT)
Dept: INTERNAL MEDICINE | Facility: CLINIC | Age: 89
End: 2024-10-15
Payer: COMMERCIAL

## 2024-10-15 NOTE — TELEPHONE ENCOUNTER
Left voicemail for patient to callback to clinic to schedule AWV.  Upon callback please assist patient to schedule with any available provider

## 2024-11-04 ENCOUNTER — LAB REQUISITION (OUTPATIENT)
Dept: LAB | Facility: CLINIC | Age: 89
End: 2024-11-04
Payer: COMMERCIAL

## 2024-11-04 DIAGNOSIS — I10 ESSENTIAL (PRIMARY) HYPERTENSION: ICD-10-CM

## 2024-11-04 DIAGNOSIS — E03.9 HYPOTHYROIDISM, UNSPECIFIED: ICD-10-CM

## 2024-11-06 LAB
ANION GAP SERPL CALCULATED.3IONS-SCNC: 11 MMOL/L (ref 7–15)
BUN SERPL-MCNC: 17.1 MG/DL (ref 8–23)
CALCIUM SERPL-MCNC: 10.4 MG/DL (ref 8.8–10.4)
CHLORIDE SERPL-SCNC: 103 MMOL/L (ref 98–107)
CREAT SERPL-MCNC: 0.64 MG/DL (ref 0.51–0.95)
EGFRCR SERPLBLD CKD-EPI 2021: 83 ML/MIN/1.73M2
ERYTHROCYTE [DISTWIDTH] IN BLOOD BY AUTOMATED COUNT: 15.1 % (ref 10–15)
GLUCOSE SERPL-MCNC: 165 MG/DL (ref 70–99)
HCO3 SERPL-SCNC: 25 MMOL/L (ref 22–29)
HCT VFR BLD AUTO: 44.3 % (ref 35–47)
HGB BLD-MCNC: 13.9 G/DL (ref 11.7–15.7)
MCH RBC QN AUTO: 28.5 PG (ref 26.5–33)
MCHC RBC AUTO-ENTMCNC: 31.4 G/DL (ref 31.5–36.5)
MCV RBC AUTO: 91 FL (ref 78–100)
PLATELET # BLD AUTO: 258 10E3/UL (ref 150–450)
POTASSIUM SERPL-SCNC: 4.3 MMOL/L (ref 3.4–5.3)
RBC # BLD AUTO: 4.88 10E6/UL (ref 3.8–5.2)
SODIUM SERPL-SCNC: 139 MMOL/L (ref 135–145)
TSH SERPL DL<=0.005 MIU/L-ACNC: 4.5 UIU/ML (ref 0.3–4.2)
WBC # BLD AUTO: 9.7 10E3/UL (ref 4–11)

## 2024-11-06 PROCEDURE — P9603 ONE-WAY ALLOW PRORATED MILES: HCPCS | Mod: ORL | Performed by: NURSE PRACTITIONER

## 2024-11-06 PROCEDURE — 85027 COMPLETE CBC AUTOMATED: CPT | Mod: ORL | Performed by: NURSE PRACTITIONER

## 2024-11-06 PROCEDURE — 84443 ASSAY THYROID STIM HORMONE: CPT | Mod: ORL | Performed by: NURSE PRACTITIONER

## 2024-11-06 PROCEDURE — 36415 COLL VENOUS BLD VENIPUNCTURE: CPT | Mod: ORL | Performed by: NURSE PRACTITIONER

## 2024-11-06 PROCEDURE — 80048 BASIC METABOLIC PNL TOTAL CA: CPT | Mod: ORL | Performed by: NURSE PRACTITIONER

## 2024-11-12 ENCOUNTER — TELEPHONE (OUTPATIENT)
Dept: INTERNAL MEDICINE | Facility: CLINIC | Age: 89
End: 2024-11-12
Payer: COMMERCIAL

## 2024-11-12 NOTE — TELEPHONE ENCOUNTER
Patient Quality Outreach    Patient is due for the following:   Diabetes -  A1C, Microalbumin, and Foot Exam  Asthma  -  AAP  Depression  -  PHQ-9 needed  Physical Annual Wellness Visit    Action(s) Taken:   Schedule a Annual Wellness Visit  Patient was assigned appropriate questionnaire to complete    Type of outreach:    Sent letter.    Questions for provider review:    None           Lisbeth Rodriguez, [facility]

## 2024-11-18 NOTE — TELEPHONE ENCOUNTER
"Requested Prescriptions   Pending Prescriptions Disp Refills     traZODone (DESYREL) 150 MG tablet [Pharmacy Med Name: TRAZODONE 150 MG TABLET] 90 tablet 3    Last Written Prescription Date:  08/24/2017  Last Fill Quantity: 90,  # refills: 3   Last office visit: 8/24/2017 with prescribing provider:     Future Office Visit:   Sig: TAKE 1 TABLET (150 MG) BY MOUTH AT BEDTIME    Serotonin Modulators Passed    7/24/2018  5:24 PM       Passed - Recent (12 mo) or future (30 days) visit within the authorizing provider's specialty    Patient had office visit in the last 12 months or has a visit in the next 30 days with authorizing provider or within the authorizing provider's specialty.  See \"Patient Info\" tab in inbasket, or \"Choose Columns\" in Meds & Orders section of the refill encounter.           Passed - Patient is age 18 or older       Passed - No active pregnancy on record       Passed - No positive pregnancy test in past 12 months        " An 86 year old female, living w/ son at home, ambulating w/ a cane, w/ Dementia, AAOx 2 to 3 at baseline, w/ PMHx of DM, HTN, and HLD was brought into the ED by EMS due to AMS. Patient AAOx2 to person and place, but does not know why she is in the hospital. Denies chest pain, dyspnea, palpitations, nausea, vomiting, chills, numbness, headache, visual spots, hearing/taste/smell changes, warm sensation, urinary or stool incontinence. Patient mentioned feeling tired. As per daughter, patient was having decreased appetite, nausea, one episode of non bloody emesis, 2 episodes of non bloody, watery stools, abdominal pain, weakness, and confused for the last 2 days. Daughter gave her Prilosec yesterday which resolved her abdominal pain. No recent sick contacts or travel. She does not have any other complaints.

## 2024-12-12 ENCOUNTER — MEDICAL CORRESPONDENCE (OUTPATIENT)
Dept: HEALTH INFORMATION MANAGEMENT | Facility: CLINIC | Age: 89
End: 2024-12-12

## 2024-12-16 ENCOUNTER — LAB REQUISITION (OUTPATIENT)
Dept: LAB | Facility: CLINIC | Age: 89
End: 2024-12-16
Payer: COMMERCIAL

## 2024-12-16 DIAGNOSIS — E11.9 TYPE 2 DIABETES MELLITUS WITHOUT COMPLICATIONS (H): ICD-10-CM

## 2024-12-18 LAB
EST. AVERAGE GLUCOSE BLD GHB EST-MCNC: 171 MG/DL
HBA1C MFR BLD: 7.6 %

## 2024-12-18 PROCEDURE — 36415 COLL VENOUS BLD VENIPUNCTURE: CPT | Mod: ORL | Performed by: FAMILY MEDICINE

## 2024-12-18 PROCEDURE — P9603 ONE-WAY ALLOW PRORATED MILES: HCPCS | Mod: ORL | Performed by: FAMILY MEDICINE

## 2024-12-18 PROCEDURE — 83036 HEMOGLOBIN GLYCOSYLATED A1C: CPT | Mod: ORL | Performed by: FAMILY MEDICINE

## 2025-01-22 ENCOUNTER — HOSPITAL ENCOUNTER (OUTPATIENT)
Dept: CARDIOLOGY | Facility: CLINIC | Age: OVER 89
Discharge: HOME | End: 2025-01-22
Attending: FAMILY MEDICINE
Payer: COMMERCIAL

## 2025-01-22 DIAGNOSIS — R06.02 SHORTNESS OF BREATH: ICD-10-CM

## 2025-01-22 LAB — LVEF ECHO: NORMAL

## 2025-01-22 PROCEDURE — 93306 TTE W/DOPPLER COMPLETE: CPT | Mod: 26 | Performed by: INTERNAL MEDICINE

## 2025-01-22 PROCEDURE — 93306 TTE W/DOPPLER COMPLETE: CPT

## 2025-01-29 ENCOUNTER — MEDICAL CORRESPONDENCE (OUTPATIENT)
Dept: HEALTH INFORMATION MANAGEMENT | Facility: CLINIC | Age: OVER 89
End: 2025-01-29
Payer: COMMERCIAL

## 2025-02-04 DIAGNOSIS — R06.02 SHORTNESS OF BREATH: Primary | ICD-10-CM

## 2025-02-04 NOTE — Clinical Note
Future Appointments 6/18/2025  10:00 AM   CS PULMONARY FUNCTION      [facility]SPULM              CS 6/18/2025  11:00 AM   [facility]

## 2025-02-05 ENCOUNTER — TRANSCRIBE ORDERS (OUTPATIENT)
Dept: OTHER | Age: OVER 89
End: 2025-02-05

## 2025-02-05 DIAGNOSIS — R06.02 SHORTNESS OF BREATH: Primary | ICD-10-CM

## 2025-02-13 ENCOUNTER — TELEPHONE (OUTPATIENT)
Dept: PULMONOLOGY | Facility: CLINIC | Age: OVER 89
End: 2025-02-13
Payer: COMMERCIAL

## 2025-02-13 NOTE — TELEPHONE ENCOUNTER
Called and spoke with Lu.  Patient has not yet been seen.  Due to be seen in June.  She is advised that the referring provider will get sent a copy of the notes.     Annette Alcazar RN

## 2025-02-13 NOTE — TELEPHONE ENCOUNTER
[facility]    Phone Message    May a detailed message be left on voicemail: yes     Reason for Call: Other: Pt's referring clinic would like clinical notes faxed to: [phone]. Once pt has seen provider.         Action Taken: Other: Pulm     Travel Screening: Not Applicable     Date of Service:

## 2025-05-28 ENCOUNTER — TELEPHONE (OUTPATIENT)
Dept: INTERNAL MEDICINE | Facility: CLINIC | Age: OVER 89
End: 2025-05-28
Payer: COMMERCIAL

## 2025-05-28 NOTE — TELEPHONE ENCOUNTER
Patient Quality Outreach    Patient is due for the following:   Diabetes -  A1C, Microalbumin, and Foot Exam  Asthma  -  ACT needed  Depression  -  PHQ-9 needed  Physical Annual Wellness Visit    Action(s) Taken:   Schedule a Annual Wellness Visit    Type of outreach:    Sent [employer] message.  6/11/2025 left voice message.    Questions for provider review:    None         Lisbeth Rodriguez, [facility]  Chart routed to None.

## 2025-06-06 ENCOUNTER — LAB REQUISITION (OUTPATIENT)
Dept: LAB | Facility: CLINIC | Age: OVER 89
End: 2025-06-06
Payer: COMMERCIAL

## 2025-06-06 DIAGNOSIS — E03.9 HYPOTHYROIDISM, UNSPECIFIED: ICD-10-CM

## 2025-06-11 LAB
ANION GAP SERPL CALCULATED.3IONS-SCNC: 11 MMOL/L (ref 7–15)
BUN SERPL-MCNC: 20.1 MG/DL (ref 8–23)
CALCIUM SERPL-MCNC: 10.3 MG/DL (ref 8.8–10.4)
CHLORIDE SERPL-SCNC: 106 MMOL/L (ref 98–107)
CREAT SERPL-MCNC: 0.69 MG/DL (ref 0.51–0.95)
EGFRCR SERPLBLD CKD-EPI 2021: 82 ML/MIN/1.73M2
ERYTHROCYTE [DISTWIDTH] IN BLOOD BY AUTOMATED COUNT: 15.6 % (ref 10–15)
EST. AVERAGE GLUCOSE BLD GHB EST-MCNC: 177 MG/DL
GLUCOSE SERPL-MCNC: 135 MG/DL (ref 70–99)
HBA1C MFR BLD: 7.8 %
HCO3 SERPL-SCNC: 23 MMOL/L (ref 22–29)
HCT VFR BLD AUTO: 42.4 % (ref 35–47)
HGB BLD-MCNC: 13.1 G/DL (ref 11.7–15.7)
MCH RBC QN AUTO: 27.7 PG (ref 26.5–33)
MCHC RBC AUTO-ENTMCNC: 30.9 G/DL (ref 31.5–36.5)
MCV RBC AUTO: 90 FL (ref 78–100)
PLATELET # BLD AUTO: 209 10E3/UL (ref 150–450)
POTASSIUM SERPL-SCNC: 4.5 MMOL/L (ref 3.4–5.3)
RBC # BLD AUTO: 4.73 10E6/UL (ref 3.8–5.2)
SODIUM SERPL-SCNC: 140 MMOL/L (ref 135–145)
TSH SERPL DL<=0.005 MIU/L-ACNC: 3.24 UIU/ML (ref 0.3–4.2)
WBC # BLD AUTO: 9.2 10E3/UL (ref 4–11)

## 2025-06-11 PROCEDURE — 84443 ASSAY THYROID STIM HORMONE: CPT | Mod: ORL | Performed by: NURSE PRACTITIONER

## 2025-06-11 PROCEDURE — 85027 COMPLETE CBC AUTOMATED: CPT | Mod: ORL | Performed by: NURSE PRACTITIONER

## 2025-06-11 PROCEDURE — 83036 HEMOGLOBIN GLYCOSYLATED A1C: CPT | Mod: ORL | Performed by: NURSE PRACTITIONER

## 2025-06-11 PROCEDURE — P9603 ONE-WAY ALLOW PRORATED MILES: HCPCS | Mod: ORL | Performed by: NURSE PRACTITIONER

## 2025-06-11 PROCEDURE — 36415 COLL VENOUS BLD VENIPUNCTURE: CPT | Mod: ORL | Performed by: NURSE PRACTITIONER

## 2025-06-11 PROCEDURE — 80048 BASIC METABOLIC PNL TOTAL CA: CPT | Mod: ORL | Performed by: NURSE PRACTITIONER

## 2025-06-16 ENCOUNTER — TELEPHONE (OUTPATIENT)
Dept: PULMONOLOGY | Facility: CLINIC | Age: OVER 89
End: 2025-06-16
Payer: COMMERCIAL

## 2025-06-16 NOTE — TELEPHONE ENCOUNTER
"Patient needs CXR prior to visit with Dr Lechuga on 6/18/25. Unable to LVM for daugther \"Bessy\" due to full mailbox.     Ladarius Brandt RN    "

## 2025-07-10 ENCOUNTER — LAB REQUISITION (OUTPATIENT)
Dept: LAB | Facility: CLINIC | Age: OVER 89
End: 2025-07-10
Payer: COMMERCIAL

## 2025-07-10 DIAGNOSIS — R30.0 DYSURIA: ICD-10-CM

## 2025-07-10 LAB
ALBUMIN UR-MCNC: 30 MG/DL
APPEARANCE UR: ABNORMAL
BILIRUB UR QL STRIP: NEGATIVE
CAOX CRY #/AREA URNS HPF: ABNORMAL /HPF
COLOR UR AUTO: ABNORMAL
GLUCOSE UR STRIP-MCNC: NEGATIVE MG/DL
HGB UR QL STRIP: ABNORMAL
HYALINE CASTS: 11 /LPF
KETONES UR STRIP-MCNC: NEGATIVE MG/DL
LEUKOCYTE ESTERASE UR QL STRIP: ABNORMAL
MUCOUS THREADS #/AREA URNS LPF: PRESENT /LPF
NITRATE UR QL: NEGATIVE
PH UR STRIP: 5.5 [PH] (ref 5–7)
RBC URINE: 10 /HPF
SP GR UR STRIP: 1.02 (ref 1–1.03)
SQUAMOUS EPITHELIAL: 2 /HPF
TRANSITIONAL EPI: 4 /HPF
UROBILINOGEN UR STRIP-MCNC: NORMAL MG/DL
WBC CLUMPS #/AREA URNS HPF: PRESENT /HPF
WBC URINE: >182 /HPF

## 2025-07-10 PROCEDURE — 81001 URINALYSIS AUTO W/SCOPE: CPT | Mod: ORL | Performed by: NURSE PRACTITIONER

## 2025-07-10 PROCEDURE — 87086 URINE CULTURE/COLONY COUNT: CPT | Mod: ORL | Performed by: NURSE PRACTITIONER

## 2025-07-12 LAB — BACTERIA UR CULT: ABNORMAL

## 2025-09-04 ENCOUNTER — HOSPITAL ENCOUNTER (OUTPATIENT)
Dept: GENERAL RADIOLOGY | Facility: CLINIC | Age: OVER 89
Discharge: HOME | End: 2025-09-04
Attending: PHYSICIAN ASSISTANT
Payer: COMMERCIAL

## 2025-09-04 DIAGNOSIS — R06.09 DOE (DYSPNEA ON EXERTION): ICD-10-CM

## 2025-09-04 PROCEDURE — 71046 X-RAY EXAM CHEST 2 VIEWS: CPT
